# Patient Record
Sex: FEMALE | Race: WHITE | NOT HISPANIC OR LATINO | Employment: FULL TIME | URBAN - METROPOLITAN AREA
[De-identification: names, ages, dates, MRNs, and addresses within clinical notes are randomized per-mention and may not be internally consistent; named-entity substitution may affect disease eponyms.]

---

## 2017-09-06 DIAGNOSIS — M79.672 LEFT FOOT PAIN: Primary | ICD-10-CM

## 2017-09-07 ENCOUNTER — OFFICE VISIT (OUTPATIENT)
Dept: ORTHOPEDICS | Facility: CLINIC | Age: 47
End: 2017-09-07
Payer: COMMERCIAL

## 2017-09-07 ENCOUNTER — HOSPITAL ENCOUNTER (OUTPATIENT)
Dept: RADIOLOGY | Facility: HOSPITAL | Age: 47
Discharge: HOME | End: 2017-09-07
Attending: ORTHOPAEDIC SURGERY
Payer: COMMERCIAL

## 2017-09-07 VITALS
DIASTOLIC BLOOD PRESSURE: 69 MMHG | BODY MASS INDEX: 34.23 KG/M2 | WEIGHT: 213 LBS | HEART RATE: 97 BPM | SYSTOLIC BLOOD PRESSURE: 130 MMHG | HEIGHT: 66 IN

## 2017-09-07 DIAGNOSIS — M76.822 POSTERIOR TIBIAL TENDONITIS, LEFT: Primary | ICD-10-CM

## 2017-09-07 DIAGNOSIS — M79.672 LEFT FOOT PAIN: ICD-10-CM

## 2017-09-07 PROCEDURE — 20550 NJX 1 TENDON SHEATH/LIGAMENT: CPT | Mod: LT,S$GLB,, | Performed by: ORTHOPAEDIC SURGERY

## 2017-09-07 PROCEDURE — 73630 X-RAY EXAM OF FOOT: CPT | Mod: TC,PN,LT

## 2017-09-07 PROCEDURE — 73630 X-RAY EXAM OF FOOT: CPT | Mod: 26,LT,, | Performed by: RADIOLOGY

## 2017-09-07 PROCEDURE — 99244 OFF/OP CNSLTJ NEW/EST MOD 40: CPT | Mod: 25,S$GLB,, | Performed by: ORTHOPAEDIC SURGERY

## 2017-09-07 PROCEDURE — 99999 PR PBB SHADOW E&M-EST. PATIENT-LVL III: CPT | Mod: PBBFAC,,, | Performed by: ORTHOPAEDIC SURGERY

## 2017-09-07 RX ADMIN — TRIAMCINOLONE ACETONIDE 40 MG: 40 INJECTION, SUSPENSION INTRA-ARTICULAR; INTRAMUSCULAR at 05:09

## 2017-09-07 NOTE — PROCEDURES
Tendon Sheath  Date/Time: 9/7/2017 5:28 PM  Performed by: AMRKOS MOORE  Authorized by: MARKOS MOORE     Consent Done?:  Yes (Verbal)  Indications:  Pain  Site marked: the procedure site was marked    Location:  Foot  Foot joint: left posterior tibial tendon.  Prep: patient was prepped and draped in usual sterile fashion    Needle size:  22 G  Approach:  Medial  Medications:  40 mg triamcinolone acetonide 40 mg/mL  Patient tolerance:  Patient tolerated the procedure well with no immediate complications

## 2017-09-07 NOTE — LETTER
September 11, 2017      Adsi Mccann MD  [address] [facility] [address]  [facility] [address]           [address] [address]  Phone: [phone]          Patient: Gi Abdullahi   MR Number: [id number]   YOB: 1970   Date of Visit: 9/7/2017       Dear Dr. Adis Mccann:    Thank you for referring Gi Abdullahi to me for evaluation. Attached you will find relevant portions of my assessment and plan of care.    If you have questions, please do not hesitate to call me. I look forward to following Gi Abdullahi along with you.    Sincerely,    Dimitri Chapman    Enclosure  CC:  No Recipients    If you would like to receive this communication electronically, please contact [email or web address] or [phone] to request more information on [employer] Link access.    For providers and/or their staff who would like to refer a patient to [facility], please contact us through our one-stop-shop provider referral line, [facility], at [phone].    If you feel you have received this communication in error or would no longer like to receive these types of communications, please e-mail [email or web address]

## 2017-09-08 ENCOUNTER — TELEPHONE (OUTPATIENT)
Dept: ORTHOPEDICS | Facility: CLINIC | Age: 47
End: 2017-09-08

## 2017-09-11 RX ORDER — IBUPROFEN 800 MG/1
800 TABLET ORAL 3 TIMES DAILY
Qty: 60 TABLET | Refills: 1 | Status: SHIPPED | OUTPATIENT
Start: 2017-09-11 | End: 2018-01-16

## 2017-09-18 NOTE — PROGRESS NOTES
"HPI: Gi Abdullahi is a  47 y.o. female who was referred to me by Dr. Mccann and was seen in consultation today for left foot pain. The pain began about 2 months ago.  She notes a knot on the side of the foot. She said she did have an Ankle sprain in march but that resolved.  The pain is worse with walking and standing. She rates her pain as 8/10 today     PAST MEDICAL/SURGICAL/FAMILY/SOCIAL/ HISTORY: REVIEWED    ALLERGIES/MEDICATIONS: REVIEWED       Review of Systems:     Constitution: Negative.   HEENT: Negative.   Eyes: Negative.   Cardiovascular: Negative.   Respiratory: Negative.   Endocrine: Negative.   Hematologic/Lymphatic: Negative.   Skin: Negative.   Musculoskeletal: Positive for left foot pain   Gastrointestinal: Negative.   Genitourinary: Negative.   Neurological: Negative.   Psychiatric/Behavioral: Negative.   Allergic/Immunologic: Negative.       PHYSICAL EXAM:  Vitals:    09/07/17 1310   BP: 130/69   Pulse: 97     Ht Readings from Last 1 Encounters:   09/07/17 5' 6" (1.676 m)     Wt Readings from Last 1 Encounters:   09/07/17 96.6 kg (213 lb)       GENERAL: Well developed, well nourished, no acute distress.  SKIN: Skin is intact. No atrophy, abrasions or lesions are noted.   Neurological: Normal mental status. Appropriate and conversant. Alert and oriented x 3.  GAIT: Walks with antalgic gait.    Left  lower extremity:  2+ dorsalis pedis pulse.  Capillary refill < 3 seconds.  Mildly decreased range of motion tibiotalar and subtalar joints.   5/5 strength EHL, FHL, tibialis anterior, gastrocsoleus,  tibialis posterior and peroneals. Sensation to light touch intact sural, saphenous, superficial peroneal and deep peroneal nerves. + swelling,  tenderness to palpation and cyst formation  at insertion of posterior tibial tendon. No ecchymosis or deformity. No lymphadenopathy, no masses or tumors palpated.      XRAYS:   3 views of left foot obtained and reviewed today reveal No evidence of fractures or " dislocations.       ASSESSMENT:        Encounter Diagnosis   Name Primary?    Posterior tibial tendonitis, left Yes        PLAN:   I spent 15 minutes in consulation with the patient today. More than half the time was spent counseling the patient on her condition.  I injected the left foot today. Return to clinic if symptoms persist.

## 2017-09-19 RX ORDER — TRIAMCINOLONE ACETONIDE 40 MG/ML
40 INJECTION, SUSPENSION INTRA-ARTICULAR; INTRAMUSCULAR
Status: DISCONTINUED | OUTPATIENT
Start: 2017-09-07 | End: 2017-09-19 | Stop reason: HOSPADM

## 2018-01-11 ENCOUNTER — TELEPHONE (OUTPATIENT)
Dept: FAMILY MEDICINE | Facility: CLINIC | Age: 48
End: 2018-01-11

## 2018-01-11 NOTE — TELEPHONE ENCOUNTER
Karo,  Can you call pt and please let her know she needs to [facility] care with . Her last clinic visit was with nurse practictioner, JUSTINE Dwyer in Jan 2017.  We got a fax for a refill on her Fluoxetine 40mg from [facility].  I'll fax the pharm if you can call pt to get her appt. ty :)

## 2018-01-15 ENCOUNTER — DOCUMENTATION ONLY (OUTPATIENT)
Dept: FAMILY MEDICINE | Facility: CLINIC | Age: 48
End: 2018-01-15

## 2018-01-15 NOTE — PROGRESS NOTES
Health Maintenance Due   Topic Date Due    Lipid Panel  1970    TETANUS VACCINE  04/28/1988    Pneumococcal PPSV23 (Medium Risk) (1) 04/28/1988    Mammogram  04/28/2010    Influenza Vaccine  08/01/2017

## 2018-01-16 ENCOUNTER — OFFICE VISIT (OUTPATIENT)
Dept: FAMILY MEDICINE | Facility: CLINIC | Age: 48
End: 2018-01-16
Payer: COMMERCIAL

## 2018-01-16 VITALS
WEIGHT: 227.75 LBS | HEIGHT: 66 IN | BODY MASS INDEX: 36.6 KG/M2 | SYSTOLIC BLOOD PRESSURE: 109 MMHG | HEART RATE: 77 BPM | DIASTOLIC BLOOD PRESSURE: 75 MMHG | OXYGEN SATURATION: 99 % | TEMPERATURE: 98 F

## 2018-01-16 DIAGNOSIS — Z13.29 SCREENING FOR ENDOCRINE, METABOLIC, AND IMMUNITY DISORDER: ICD-10-CM

## 2018-01-16 DIAGNOSIS — Z13.0 SCREENING FOR DEFICIENCY ANEMIA: ICD-10-CM

## 2018-01-16 DIAGNOSIS — Z13.220 SCREENING CHOLESTEROL LEVEL: ICD-10-CM

## 2018-01-16 DIAGNOSIS — F32.A ANXIETY AND DEPRESSION: ICD-10-CM

## 2018-01-16 DIAGNOSIS — R40.0 HAS DAYTIME DROWSINESS: ICD-10-CM

## 2018-01-16 DIAGNOSIS — Z12.31 ENCOUNTER FOR SCREENING MAMMOGRAM FOR BREAST CANCER: ICD-10-CM

## 2018-01-16 DIAGNOSIS — Z13.1 DIABETES MELLITUS SCREENING: ICD-10-CM

## 2018-01-16 DIAGNOSIS — B35.4 TINEA CORPORIS: Primary | ICD-10-CM

## 2018-01-16 DIAGNOSIS — Z13.228 SCREENING FOR ENDOCRINE, METABOLIC, AND IMMUNITY DISORDER: ICD-10-CM

## 2018-01-16 DIAGNOSIS — Z13.0 SCREENING FOR ENDOCRINE, METABOLIC, AND IMMUNITY DISORDER: ICD-10-CM

## 2018-01-16 DIAGNOSIS — Z23 FLU VACCINE NEED: ICD-10-CM

## 2018-01-16 DIAGNOSIS — F41.9 ANXIETY AND DEPRESSION: ICD-10-CM

## 2018-01-16 PROCEDURE — 99203 OFFICE O/P NEW LOW 30 MIN: CPT | Mod: 25,S$GLB,, | Performed by: NURSE PRACTITIONER

## 2018-01-16 PROCEDURE — 90686 IIV4 VACC NO PRSV 0.5 ML IM: CPT | Mod: S$GLB,,, | Performed by: INTERNAL MEDICINE

## 2018-01-16 PROCEDURE — 90471 IMMUNIZATION ADMIN: CPT | Mod: S$GLB,,, | Performed by: INTERNAL MEDICINE

## 2018-01-16 RX ORDER — FLUOXETINE HYDROCHLORIDE 40 MG/1
1 CAPSULE ORAL DAILY
COMMUNITY
Start: 2017-01-05 | End: 2018-01-16 | Stop reason: SDUPTHER

## 2018-01-16 RX ORDER — FLUOXETINE HYDROCHLORIDE 40 MG/1
40 CAPSULE ORAL DAILY
Qty: 90 CAPSULE | Refills: 3 | Status: SHIPPED | OUTPATIENT
Start: 2018-01-16 | End: 2019-04-12 | Stop reason: SDUPTHER

## 2018-01-16 RX ORDER — CLOTRIMAZOLE AND BETAMETHASONE DIPROPIONATE 10; .64 MG/G; MG/G
CREAM TOPICAL 2 TIMES DAILY
Qty: 45 G | Refills: 0 | Status: ON HOLD | OUTPATIENT
Start: 2018-01-16 | End: 2018-09-05 | Stop reason: HOSPADM

## 2018-01-16 NOTE — PATIENT INSTRUCTIONS
"  Start with the lotrisone cream. Wash the rash area with a mild soap like dial or dove. Pat dry with a towel. Apply a thin layer of the Lotrisone cream. Do this twice a day until the rash is completely gone. Then,Get Lamisil (Terbinafine) cream over the counter. Do not use any other "anti-fungal" creams or sprays as they are ineffective.  Use once a day (on the same area after washing and drying it as instructed above) for another month. If you stop as soon as the rash is gone, in a few days it will come right back as the infection is deep in the layers of the skin. Once you have finished the 6 weeks of Terbinafine, keep the area clean and dry by using powder daily.   "

## 2018-01-16 NOTE — PROGRESS NOTES
Subjective:       Patient ID: Gi Abdullahi is a 47 y.o. female.    Chief Complaint: Establish Care and Medication Refill    Rash   This is a recurrent problem. The current episode started in the past 7 days. The problem has been rapidly worsening since onset. The affected locations include the abdomen and chest. The rash is characterized by redness, itchiness and burning. She was exposed to nothing. Treatments tried: powder. The treatment provided no relief.   Anxiety   Presents for initial visit. Onset was more than 5 years ago. The problem has been waxing and waning. Symptoms include depressed mood, insomnia and nervous/anxious behavior. Patient reports no suicidal ideas. Symptoms occur constantly. The severity of symptoms is causing significant distress.     Her past medical history is significant for anxiety/panic attacks and depression. There is no history of bipolar disorder. Past treatments include SSRIs and benzodiazephines. Compliance with prior treatments has been good.     Insomnia is chronic, does not have any difficulty falling asleep, but wakes up and can't go back to sleep. Snores loudly, sometimes wakes up feeling dyspneic, has daytime drowsiness.   Review of Systems   Skin: Positive for rash.   Psychiatric/Behavioral: Negative for suicidal ideas. The patient is nervous/anxious and has insomnia.        Objective:      Physical Exam   Constitutional: She is oriented to person, place, and time. She appears well-developed and well-nourished. No distress.   HENT:   Head: Normocephalic and atraumatic.   Right Ear: External ear normal.   Left Ear: External ear normal.   Mouth/Throat: Oropharynx is clear and moist. No oropharyngeal exudate.   Eyes: Conjunctivae are normal. Right eye exhibits no discharge. Left eye exhibits no discharge. No scleral icterus.   Neck: Normal range of motion. Neck supple. No JVD present. No tracheal deviation present. No thyromegaly present.   No supraclavicular adenopathy.  "  Cardiovascular: Normal rate, regular rhythm and normal heart sounds.  Exam reveals no gallop and no friction rub.    No murmur heard.  Pulmonary/Chest: Effort normal and breath sounds normal. No respiratory distress. She has no wheezes. She has no rales.   Abdominal: Soft. Bowel sounds are normal. She exhibits no distension and no mass. There is no tenderness. There is no guarding.   Musculoskeletal: She exhibits no edema.   Lymphadenopathy:     She has no cervical adenopathy.   Neurological: She is alert and oriented to person, place, and time.   Skin: Skin is warm and dry. She is not diaphoretic.   Psychiatric: She has a normal mood and affect. Her behavior is normal.   Nursing note and vitals reviewed.      Assessment:       1. Tinea corporis    2. Anxiety and depression    3. Screening cholesterol level    4. Screening for endocrine, metabolic, and immunity disorder    5. Screening for deficiency anemia    6. Diabetes mellitus screening    7. Has daytime drowsiness        Plan:     Start with the lotrisone cream. Wash the rash area with a mild soap like dial or dove. Pat dry with a towel. Apply a thin layer of the Lotrisone cream. Do this twice a day until the rash is completely gone. Then,Get Lamisil (Terbinafine) cream over the counter. Do not use any other "anti-fungal" creams or sprays as they are ineffective.  Use once a day (on the same area after washing and drying it as instructed above) for another month. If you stop as soon as the rash is gone, in a few days it will come right back as the infection is deep in the layers of the skin. Once you have finished the 6 weeks of Terbinafine, keep the area clean and dry by using powder daily.     Tinea corporis  -     clotrimazole-betamethasone 1-0.05% (LOTRISONE) cream; Apply topically 2 (two) times daily.  Dispense: 45 g; Refill: 0    Anxiety and depression  -     FLUoxetine (PROZAC) 40 MG capsule; Take 1 capsule (40 mg total) by mouth once daily.  Dispense: " 90 capsule; Refill: 3    Screening cholesterol level  -     Lipid panel; Future; Expected date: 01/16/2018    Screening for endocrine, metabolic, and immunity disorder  -     Comprehensive metabolic panel; Future; Expected date: 01/16/2018  -     TSH; Future; Expected date: 01/16/2018    Screening for deficiency anemia  -     CBC auto differential; Future; Expected date: 01/16/2018    Diabetes mellitus screening  -     Hemoglobin A1c; Future; Expected date: 01/16/2018    Has daytime drowsiness  -     Polysomnography 4 or more parameters; Future; Expected date: 01/30/2018       1 month

## 2018-09-02 ENCOUNTER — HOSPITAL ENCOUNTER (INPATIENT)
Facility: HOSPITAL | Age: 48
LOS: 1 days | Discharge: HOME | DRG: 418 | End: 2018-09-05
Attending: EMERGENCY MEDICINE | Admitting: HOSPITALIST
Payer: COMMERCIAL

## 2018-09-02 DIAGNOSIS — K80.21 CALCULUS OF GALLBLADDER WITH BILIARY OBSTRUCTION BUT WITHOUT CHOLECYSTITIS: Primary | ICD-10-CM

## 2018-09-02 DIAGNOSIS — R07.9 CHEST PAIN: ICD-10-CM

## 2018-09-02 PROBLEM — F41.9 ANXIETY AND DEPRESSION: Status: ACTIVE | Noted: 2018-09-02

## 2018-09-02 PROBLEM — F32.A ANXIETY AND DEPRESSION: Status: ACTIVE | Noted: 2018-09-02

## 2018-09-02 PROBLEM — K80.00 CALCULUS OF GALLBLADDER WITH ACUTE CHOLECYSTITIS WITHOUT OBSTRUCTION: Status: ACTIVE | Noted: 2018-09-02

## 2018-09-02 PROBLEM — F17.200 NICOTINE DEPENDENCE WITH CURRENT USE: Status: ACTIVE | Noted: 2018-09-02

## 2018-09-02 LAB
ALBUMIN SERPL BCP-MCNC: 3.6 G/DL
ALP SERPL-CCNC: 82 U/L
ALT SERPL W/O P-5'-P-CCNC: 27 U/L
ANION GAP SERPL CALC-SCNC: 10 MMOL/L
AST SERPL-CCNC: 16 U/L
BASOPHILS # BLD AUTO: 0.2 K/UL
BASOPHILS NFR BLD: 2.3 %
BILIRUB SERPL-MCNC: 0.2 MG/DL
BNP SERPL-MCNC: <10 PG/ML
BUN SERPL-MCNC: 9 MG/DL
CALCIUM SERPL-MCNC: 9.9 MG/DL
CHLORIDE SERPL-SCNC: 103 MMOL/L
CO2 SERPL-SCNC: 26 MMOL/L
CREAT SERPL-MCNC: 0.8 MG/DL
DIFFERENTIAL METHOD: ABNORMAL
EOSINOPHIL # BLD AUTO: 0.2 K/UL
EOSINOPHIL NFR BLD: 1.7 %
ERYTHROCYTE [DISTWIDTH] IN BLOOD BY AUTOMATED COUNT: 14.4 %
EST. GFR  (AFRICAN AMERICAN): >60 ML/MIN/1.73 M^2
EST. GFR  (NON AFRICAN AMERICAN): >60 ML/MIN/1.73 M^2
GLUCOSE SERPL-MCNC: 104 MG/DL
HCT VFR BLD AUTO: 43.9 %
HGB BLD-MCNC: 14.1 G/DL
LYMPHOCYTES # BLD AUTO: 4.2 K/UL
LYMPHOCYTES NFR BLD: 41.7 %
MCH RBC QN AUTO: 28.7 PG
MCHC RBC AUTO-ENTMCNC: 32 G/DL
MCV RBC AUTO: 90 FL
MONOCYTES # BLD AUTO: 0.7 K/UL
MONOCYTES NFR BLD: 6.8 %
NEUTROPHILS # BLD AUTO: 4.8 K/UL
NEUTROPHILS NFR BLD: 47.5 %
PLATELET # BLD AUTO: 368 K/UL
PMV BLD AUTO: 7.9 FL
POTASSIUM SERPL-SCNC: 4 MMOL/L
PROT SERPL-MCNC: 7 G/DL
RBC # BLD AUTO: 4.9 M/UL
SODIUM SERPL-SCNC: 139 MMOL/L
TROPONIN I SERPL DL<=0.01 NG/ML-MCNC: <0.006 NG/ML
WBC # BLD AUTO: 10.2 K/UL

## 2018-09-02 PROCEDURE — 80053 COMPREHEN METABOLIC PANEL: CPT

## 2018-09-02 PROCEDURE — 63600175 PHARM REV CODE 636 W HCPCS: Performed by: EMERGENCY MEDICINE

## 2018-09-02 PROCEDURE — 96375 TX/PRO/DX INJ NEW DRUG ADDON: CPT

## 2018-09-02 PROCEDURE — 99285 EMERGENCY DEPT VISIT HI MDM: CPT | Mod: 25

## 2018-09-02 PROCEDURE — 25000003 PHARM REV CODE 250: Performed by: NURSE PRACTITIONER

## 2018-09-02 PROCEDURE — G0378 HOSPITAL OBSERVATION PER HR: HCPCS

## 2018-09-02 PROCEDURE — 85025 COMPLETE CBC W/AUTO DIFF WBC: CPT

## 2018-09-02 PROCEDURE — 84484 ASSAY OF TROPONIN QUANT: CPT

## 2018-09-02 PROCEDURE — 93005 ELECTROCARDIOGRAM TRACING: CPT

## 2018-09-02 PROCEDURE — 83880 ASSAY OF NATRIURETIC PEPTIDE: CPT

## 2018-09-02 PROCEDURE — 96374 THER/PROPH/DIAG INJ IV PUSH: CPT

## 2018-09-02 PROCEDURE — 96376 TX/PRO/DX INJ SAME DRUG ADON: CPT

## 2018-09-02 PROCEDURE — 63600175 PHARM REV CODE 636 W HCPCS: Performed by: NURSE PRACTITIONER

## 2018-09-02 RX ORDER — IBUPROFEN 200 MG
16 TABLET ORAL
Status: DISCONTINUED | OUTPATIENT
Start: 2018-09-02 | End: 2018-09-05 | Stop reason: HOSPADM

## 2018-09-02 RX ORDER — LANOLIN ALCOHOL/MO/W.PET/CERES
800 CREAM (GRAM) TOPICAL
Status: DISCONTINUED | OUTPATIENT
Start: 2018-09-02 | End: 2018-09-05 | Stop reason: HOSPADM

## 2018-09-02 RX ORDER — ONDANSETRON 2 MG/ML
4 INJECTION INTRAMUSCULAR; INTRAVENOUS
Status: COMPLETED | OUTPATIENT
Start: 2018-09-02 | End: 2018-09-02

## 2018-09-02 RX ORDER — SODIUM CHLORIDE 0.9 % (FLUSH) 0.9 %
5 SYRINGE (ML) INJECTION
Status: DISCONTINUED | OUTPATIENT
Start: 2018-09-02 | End: 2018-09-05 | Stop reason: HOSPADM

## 2018-09-02 RX ORDER — POTASSIUM CHLORIDE 20 MEQ/15ML
40 SOLUTION ORAL
Status: DISCONTINUED | OUTPATIENT
Start: 2018-09-02 | End: 2018-09-05 | Stop reason: HOSPADM

## 2018-09-02 RX ORDER — MORPHINE SULFATE 4 MG/ML
4 INJECTION, SOLUTION INTRAMUSCULAR; INTRAVENOUS EVERY 4 HOURS PRN
Status: CANCELLED | OUTPATIENT
Start: 2018-09-02

## 2018-09-02 RX ORDER — MORPHINE SULFATE 4 MG/ML
2 INJECTION, SOLUTION INTRAMUSCULAR; INTRAVENOUS EVERY 4 HOURS PRN
Status: CANCELLED | OUTPATIENT
Start: 2018-09-02

## 2018-09-02 RX ORDER — MORPHINE SULFATE 4 MG/ML
6 INJECTION, SOLUTION INTRAMUSCULAR; INTRAVENOUS
Status: COMPLETED | OUTPATIENT
Start: 2018-09-02 | End: 2018-09-02

## 2018-09-02 RX ORDER — ONDANSETRON 2 MG/ML
8 INJECTION INTRAMUSCULAR; INTRAVENOUS EVERY 8 HOURS PRN
Status: DISCONTINUED | OUTPATIENT
Start: 2018-09-02 | End: 2018-09-05 | Stop reason: HOSPADM

## 2018-09-02 RX ORDER — IBUPROFEN 200 MG
1 TABLET ORAL DAILY
Status: DISCONTINUED | OUTPATIENT
Start: 2018-09-03 | End: 2018-09-05 | Stop reason: HOSPADM

## 2018-09-02 RX ORDER — ONDANSETRON 2 MG/ML
INJECTION INTRAMUSCULAR; INTRAVENOUS
Status: DISPENSED
Start: 2018-09-02 | End: 2018-09-03

## 2018-09-02 RX ORDER — FLUOXETINE HYDROCHLORIDE 20 MG/1
40 CAPSULE ORAL DAILY
Status: DISCONTINUED | OUTPATIENT
Start: 2018-09-03 | End: 2018-09-05 | Stop reason: HOSPADM

## 2018-09-02 RX ORDER — HYDROCODONE BITARTRATE AND ACETAMINOPHEN 10; 325 MG/1; MG/1
1 TABLET ORAL EVERY 6 HOURS PRN
Status: DISCONTINUED | OUTPATIENT
Start: 2018-09-02 | End: 2018-09-05 | Stop reason: HOSPADM

## 2018-09-02 RX ORDER — RAMELTEON 8 MG/1
8 TABLET ORAL NIGHTLY PRN
Status: DISCONTINUED | OUTPATIENT
Start: 2018-09-02 | End: 2018-09-05 | Stop reason: HOSPADM

## 2018-09-02 RX ORDER — GLUCAGON 1 MG
1 KIT INJECTION
Status: DISCONTINUED | OUTPATIENT
Start: 2018-09-02 | End: 2018-09-05 | Stop reason: HOSPADM

## 2018-09-02 RX ORDER — SODIUM CHLORIDE 9 MG/ML
INJECTION, SOLUTION INTRAVENOUS CONTINUOUS
Status: DISCONTINUED | OUTPATIENT
Start: 2018-09-02 | End: 2018-09-05 | Stop reason: HOSPADM

## 2018-09-02 RX ORDER — HYDROMORPHONE HYDROCHLORIDE 2 MG/ML
1 INJECTION, SOLUTION INTRAMUSCULAR; INTRAVENOUS; SUBCUTANEOUS EVERY 4 HOURS PRN
Status: DISCONTINUED | OUTPATIENT
Start: 2018-09-02 | End: 2018-09-05 | Stop reason: HOSPADM

## 2018-09-02 RX ORDER — POTASSIUM CHLORIDE 20 MEQ/15ML
60 SOLUTION ORAL
Status: DISCONTINUED | OUTPATIENT
Start: 2018-09-02 | End: 2018-09-05 | Stop reason: HOSPADM

## 2018-09-02 RX ORDER — AMOXICILLIN 250 MG
1 CAPSULE ORAL 2 TIMES DAILY
Status: DISCONTINUED | OUTPATIENT
Start: 2018-09-02 | End: 2018-09-05 | Stop reason: HOSPADM

## 2018-09-02 RX ORDER — IBUPROFEN 200 MG
24 TABLET ORAL
Status: DISCONTINUED | OUTPATIENT
Start: 2018-09-02 | End: 2018-09-05 | Stop reason: HOSPADM

## 2018-09-02 RX ADMIN — MORPHINE SULFATE 6 MG: 4 INJECTION, SOLUTION INTRAMUSCULAR; INTRAVENOUS at 09:09

## 2018-09-02 RX ADMIN — SODIUM CHLORIDE: 0.9 INJECTION, SOLUTION INTRAVENOUS at 11:09

## 2018-09-02 RX ADMIN — ONDANSETRON 4 MG: 2 INJECTION INTRAMUSCULAR; INTRAVENOUS at 10:09

## 2018-09-02 RX ADMIN — HYDROMORPHONE HYDROCHLORIDE 1 MG: 2 INJECTION INTRAMUSCULAR; INTRAVENOUS; SUBCUTANEOUS at 11:09

## 2018-09-02 RX ADMIN — ONDANSETRON 4 MG: 2 INJECTION INTRAMUSCULAR; INTRAVENOUS at 09:09

## 2018-09-03 ENCOUNTER — ANESTHESIA (OUTPATIENT)
Dept: SURGERY | Facility: HOSPITAL | Age: 48
DRG: 418 | End: 2018-09-03
Payer: COMMERCIAL

## 2018-09-03 ENCOUNTER — ANESTHESIA EVENT (OUTPATIENT)
Dept: SURGERY | Facility: HOSPITAL | Age: 48
DRG: 418 | End: 2018-09-03
Payer: COMMERCIAL

## 2018-09-03 PROBLEM — F33.0 MILD EPISODE OF RECURRENT MAJOR DEPRESSIVE DISORDER: Status: ACTIVE | Noted: 2018-09-02

## 2018-09-03 PROBLEM — E66.01 MORBIDLY OBESE: Status: ACTIVE | Noted: 2018-09-03

## 2018-09-03 LAB
ALBUMIN SERPL BCP-MCNC: 3.3 G/DL
ALP SERPL-CCNC: 77 U/L
ALT SERPL W/O P-5'-P-CCNC: 38 U/L
ANION GAP SERPL CALC-SCNC: 8 MMOL/L
AST SERPL-CCNC: 26 U/L
BASOPHILS # BLD AUTO: 0 K/UL
BASOPHILS NFR BLD: 0.2 %
BILIRUB SERPL-MCNC: 0.2 MG/DL
BILIRUB UR QL STRIP: NEGATIVE
BUN SERPL-MCNC: 10 MG/DL
CALCIUM SERPL-MCNC: 8.7 MG/DL
CHLORIDE SERPL-SCNC: 105 MMOL/L
CLARITY UR: CLEAR
CO2 SERPL-SCNC: 26 MMOL/L
COLOR UR: YELLOW
CREAT SERPL-MCNC: 0.7 MG/DL
DIFFERENTIAL METHOD: ABNORMAL
EOSINOPHIL # BLD AUTO: 0 K/UL
EOSINOPHIL NFR BLD: 0.1 %
ERYTHROCYTE [DISTWIDTH] IN BLOOD BY AUTOMATED COUNT: 14.4 %
EST. GFR  (AFRICAN AMERICAN): >60 ML/MIN/1.73 M^2
EST. GFR  (NON AFRICAN AMERICAN): >60 ML/MIN/1.73 M^2
GLUCOSE SERPL-MCNC: 121 MG/DL
GLUCOSE UR QL STRIP: NEGATIVE
HCT VFR BLD AUTO: 39.3 %
HGB BLD-MCNC: 13 G/DL
HGB UR QL STRIP: NEGATIVE
INR PPP: 1
KETONES UR QL STRIP: NEGATIVE
LEUKOCYTE ESTERASE UR QL STRIP: NEGATIVE
LYMPHOCYTES # BLD AUTO: 1.1 K/UL
LYMPHOCYTES NFR BLD: 9.2 %
MAGNESIUM SERPL-MCNC: 2.1 MG/DL
MCH RBC QN AUTO: 29.3 PG
MCHC RBC AUTO-ENTMCNC: 33.1 G/DL
MCV RBC AUTO: 89 FL
MONOCYTES # BLD AUTO: 0.2 K/UL
MONOCYTES NFR BLD: 1.9 %
NEUTROPHILS # BLD AUTO: 11.1 K/UL
NEUTROPHILS NFR BLD: 88.6 %
NITRITE UR QL STRIP: NEGATIVE
PH UR STRIP: 6 [PH] (ref 5–8)
PHOSPHATE SERPL-MCNC: 3.7 MG/DL
PLATELET # BLD AUTO: 331 K/UL
PMV BLD AUTO: 8.3 FL
POTASSIUM SERPL-SCNC: 5.1 MMOL/L
PROT SERPL-MCNC: 6.4 G/DL
PROT UR QL STRIP: NEGATIVE
PROTHROMBIN TIME: 9.8 SEC
RBC # BLD AUTO: 4.43 M/UL
SODIUM SERPL-SCNC: 139 MMOL/L
SP GR UR STRIP: 1.02 (ref 1–1.03)
URN SPEC COLLECT METH UR: NORMAL
UROBILINOGEN UR STRIP-ACNC: 1 EU/DL
WBC # BLD AUTO: 12.5 K/UL

## 2018-09-03 PROCEDURE — 25000003 PHARM REV CODE 250: Performed by: SURGERY

## 2018-09-03 PROCEDURE — 47562 LAPAROSCOPIC CHOLECYSTECTOMY: CPT | Mod: ,,, | Performed by: SURGERY

## 2018-09-03 PROCEDURE — G0378 HOSPITAL OBSERVATION PER HR: HCPCS

## 2018-09-03 PROCEDURE — 99254 IP/OBS CNSLTJ NEW/EST MOD 60: CPT | Mod: ,,, | Performed by: SURGERY

## 2018-09-03 PROCEDURE — 88304 TISSUE EXAM BY PATHOLOGIST: CPT | Performed by: PATHOLOGY

## 2018-09-03 PROCEDURE — 81003 URINALYSIS AUTO W/O SCOPE: CPT

## 2018-09-03 PROCEDURE — 63600175 PHARM REV CODE 636 W HCPCS: Performed by: NURSE ANESTHETIST, CERTIFIED REGISTERED

## 2018-09-03 PROCEDURE — 25000003 PHARM REV CODE 250: Performed by: NURSE ANESTHETIST, CERTIFIED REGISTERED

## 2018-09-03 PROCEDURE — 25000003 PHARM REV CODE 250: Performed by: NURSE PRACTITIONER

## 2018-09-03 PROCEDURE — 25000003 PHARM REV CODE 250: Performed by: ANESTHESIOLOGY

## 2018-09-03 PROCEDURE — 36415 COLL VENOUS BLD VENIPUNCTURE: CPT

## 2018-09-03 PROCEDURE — 0FT44ZZ RESECTION OF GALLBLADDER, PERCUTANEOUS ENDOSCOPIC APPROACH: ICD-10-PCS | Performed by: SURGERY

## 2018-09-03 PROCEDURE — 36000709 HC OR TIME LEV III EA ADD 15 MIN: Performed by: SURGERY

## 2018-09-03 PROCEDURE — D9220A PRA ANESTHESIA: Mod: ANES,,, | Performed by: ANESTHESIOLOGY

## 2018-09-03 PROCEDURE — 37000008 HC ANESTHESIA 1ST 15 MINUTES: Performed by: SURGERY

## 2018-09-03 PROCEDURE — D9220A PRA ANESTHESIA: Mod: CRNA,,, | Performed by: NURSE ANESTHETIST, CERTIFIED REGISTERED

## 2018-09-03 PROCEDURE — 36000708 HC OR TIME LEV III 1ST 15 MIN: Performed by: SURGERY

## 2018-09-03 PROCEDURE — 37000009 HC ANESTHESIA EA ADD 15 MINS: Performed by: SURGERY

## 2018-09-03 PROCEDURE — 27201423 OPTIME MED/SURG SUP & DEVICES STERILE SUPPLY: Performed by: SURGERY

## 2018-09-03 PROCEDURE — 84100 ASSAY OF PHOSPHORUS: CPT

## 2018-09-03 PROCEDURE — 80053 COMPREHEN METABOLIC PANEL: CPT

## 2018-09-03 PROCEDURE — 96361 HYDRATE IV INFUSION ADD-ON: CPT

## 2018-09-03 PROCEDURE — 63600175 PHARM REV CODE 636 W HCPCS: Performed by: NURSE PRACTITIONER

## 2018-09-03 PROCEDURE — 63600175 PHARM REV CODE 636 W HCPCS: Performed by: SURGERY

## 2018-09-03 PROCEDURE — 71000039 HC RECOVERY, EACH ADD'L HOUR: Performed by: SURGERY

## 2018-09-03 PROCEDURE — 88304 TISSUE EXAM BY PATHOLOGIST: CPT | Mod: 26,,, | Performed by: PATHOLOGY

## 2018-09-03 PROCEDURE — 85025 COMPLETE CBC W/AUTO DIFF WBC: CPT

## 2018-09-03 PROCEDURE — 85610 PROTHROMBIN TIME: CPT

## 2018-09-03 PROCEDURE — 83735 ASSAY OF MAGNESIUM: CPT

## 2018-09-03 PROCEDURE — 71000033 HC RECOVERY, INTIAL HOUR: Performed by: SURGERY

## 2018-09-03 PROCEDURE — 96365 THER/PROPH/DIAG IV INF INIT: CPT

## 2018-09-03 RX ORDER — GLYCOPYRROLATE 0.2 MG/ML
INJECTION INTRAMUSCULAR; INTRAVENOUS
Status: DISCONTINUED | OUTPATIENT
Start: 2018-09-03 | End: 2018-09-03

## 2018-09-03 RX ORDER — FENTANYL CITRATE 50 UG/ML
25 INJECTION, SOLUTION INTRAMUSCULAR; INTRAVENOUS EVERY 5 MIN PRN
Status: DISCONTINUED | OUTPATIENT
Start: 2018-09-03 | End: 2018-09-03

## 2018-09-03 RX ORDER — ENOXAPARIN SODIUM 100 MG/ML
40 INJECTION SUBCUTANEOUS EVERY 24 HOURS
Status: DISCONTINUED | OUTPATIENT
Start: 2018-09-04 | End: 2018-09-05 | Stop reason: HOSPADM

## 2018-09-03 RX ORDER — SODIUM CHLORIDE, SODIUM LACTATE, POTASSIUM CHLORIDE, CALCIUM CHLORIDE 600; 310; 30; 20 MG/100ML; MG/100ML; MG/100ML; MG/100ML
INJECTION, SOLUTION INTRAVENOUS CONTINUOUS PRN
Status: DISCONTINUED | OUTPATIENT
Start: 2018-09-03 | End: 2018-09-03

## 2018-09-03 RX ORDER — ONDANSETRON HYDROCHLORIDE 2 MG/ML
INJECTION, SOLUTION INTRAMUSCULAR; INTRAVENOUS
Status: DISCONTINUED | OUTPATIENT
Start: 2018-09-03 | End: 2018-09-03

## 2018-09-03 RX ORDER — MIDAZOLAM HYDROCHLORIDE 1 MG/ML
INJECTION, SOLUTION INTRAMUSCULAR; INTRAVENOUS
Status: DISCONTINUED | OUTPATIENT
Start: 2018-09-03 | End: 2018-09-03

## 2018-09-03 RX ORDER — SODIUM CHLORIDE, SODIUM LACTATE, POTASSIUM CHLORIDE, CALCIUM CHLORIDE 600; 310; 30; 20 MG/100ML; MG/100ML; MG/100ML; MG/100ML
125 INJECTION, SOLUTION INTRAVENOUS CONTINUOUS
Status: DISCONTINUED | OUTPATIENT
Start: 2018-09-03 | End: 2018-09-03

## 2018-09-03 RX ORDER — ACETAMINOPHEN 10 MG/ML
INJECTION, SOLUTION INTRAVENOUS
Status: DISCONTINUED | OUTPATIENT
Start: 2018-09-03 | End: 2018-09-03

## 2018-09-03 RX ORDER — MEPERIDINE HYDROCHLORIDE 50 MG/ML
12.5 INJECTION INTRAMUSCULAR; INTRAVENOUS; SUBCUTANEOUS ONCE AS NEEDED
Status: DISCONTINUED | OUTPATIENT
Start: 2018-09-03 | End: 2018-09-03

## 2018-09-03 RX ORDER — ONDANSETRON 2 MG/ML
4 INJECTION INTRAMUSCULAR; INTRAVENOUS ONCE AS NEEDED
Status: DISCONTINUED | OUTPATIENT
Start: 2018-09-03 | End: 2018-09-03

## 2018-09-03 RX ORDER — ROCURONIUM BROMIDE 10 MG/ML
INJECTION, SOLUTION INTRAVENOUS
Status: DISCONTINUED | OUTPATIENT
Start: 2018-09-03 | End: 2018-09-03

## 2018-09-03 RX ORDER — KETOROLAC TROMETHAMINE 30 MG/ML
INJECTION, SOLUTION INTRAMUSCULAR; INTRAVENOUS
Status: DISCONTINUED | OUTPATIENT
Start: 2018-09-03 | End: 2018-09-03

## 2018-09-03 RX ORDER — BUPIVACAINE HYDROCHLORIDE AND EPINEPHRINE 2.5; 5 MG/ML; UG/ML
INJECTION, SOLUTION EPIDURAL; INFILTRATION; INTRACAUDAL; PERINEURAL
Status: DISCONTINUED | OUTPATIENT
Start: 2018-09-03 | End: 2018-09-03 | Stop reason: HOSPADM

## 2018-09-03 RX ORDER — DEXAMETHASONE SODIUM PHOSPHATE 4 MG/ML
INJECTION, SOLUTION INTRA-ARTICULAR; INTRALESIONAL; INTRAMUSCULAR; INTRAVENOUS; SOFT TISSUE
Status: DISCONTINUED | OUTPATIENT
Start: 2018-09-03 | End: 2018-09-03

## 2018-09-03 RX ORDER — NEOSTIGMINE METHYLSULFATE 1 MG/ML
INJECTION, SOLUTION INTRAVENOUS
Status: DISCONTINUED | OUTPATIENT
Start: 2018-09-03 | End: 2018-09-03

## 2018-09-03 RX ORDER — OXYCODONE HYDROCHLORIDE 5 MG/1
5 TABLET ORAL ONCE AS NEEDED
Status: DISCONTINUED | OUTPATIENT
Start: 2018-09-03 | End: 2018-09-03

## 2018-09-03 RX ORDER — PROPOFOL 10 MG/ML
VIAL (ML) INTRAVENOUS
Status: DISCONTINUED | OUTPATIENT
Start: 2018-09-03 | End: 2018-09-03

## 2018-09-03 RX ORDER — LIDOCAINE HCL/PF 100 MG/5ML
SYRINGE (ML) INTRAVENOUS
Status: DISCONTINUED | OUTPATIENT
Start: 2018-09-03 | End: 2018-09-03

## 2018-09-03 RX ORDER — ENOXAPARIN SODIUM 100 MG/ML
40 INJECTION SUBCUTANEOUS EVERY 24 HOURS
Status: DISCONTINUED | OUTPATIENT
Start: 2018-09-04 | End: 2018-09-03

## 2018-09-03 RX ORDER — FENTANYL CITRATE 50 UG/ML
INJECTION, SOLUTION INTRAMUSCULAR; INTRAVENOUS
Status: DISCONTINUED | OUTPATIENT
Start: 2018-09-03 | End: 2018-09-03

## 2018-09-03 RX ORDER — CEFAZOLIN SODIUM 2 G/50ML
2 SOLUTION INTRAVENOUS ONCE
Status: COMPLETED | OUTPATIENT
Start: 2018-09-03 | End: 2018-09-03

## 2018-09-03 RX ADMIN — KETOROLAC TROMETHAMINE 30 MG: 30 INJECTION, SOLUTION INTRAMUSCULAR; INTRAVENOUS at 02:09

## 2018-09-03 RX ADMIN — HYDROCODONE BITARTRATE AND ACETAMINOPHEN 1 TABLET: 10; 325 TABLET ORAL at 08:09

## 2018-09-03 RX ADMIN — ROCURONIUM BROMIDE 40 MG: 10 INJECTION, SOLUTION INTRAVENOUS at 01:09

## 2018-09-03 RX ADMIN — ESMOLOL HYDROCHLORIDE 10 MG: 20 INJECTION INTRAVENOUS at 01:09

## 2018-09-03 RX ADMIN — PIPERACILLIN AND TAZOBACTAM 3.38 G: 3; .375 INJECTION, POWDER, LYOPHILIZED, FOR SOLUTION INTRAVENOUS; PARENTERAL at 11:09

## 2018-09-03 RX ADMIN — ONDANSETRON 4 MG: 2 INJECTION, SOLUTION INTRAMUSCULAR; INTRAVENOUS at 01:09

## 2018-09-03 RX ADMIN — GLYCOPYRROLATE 0.4 MG: 0.2 INJECTION, SOLUTION INTRAMUSCULAR; INTRAVENOUS at 02:09

## 2018-09-03 RX ADMIN — MIDAZOLAM 2 MG: 1 INJECTION INTRAMUSCULAR; INTRAVENOUS at 12:09

## 2018-09-03 RX ADMIN — NEOSTIGMINE METHYLSULFATE 3 MG: 1 INJECTION INTRAVENOUS at 02:09

## 2018-09-03 RX ADMIN — FENTANYL CITRATE 50 MCG: 50 INJECTION, SOLUTION INTRAMUSCULAR; INTRAVENOUS at 02:09

## 2018-09-03 RX ADMIN — OXYCODONE HYDROCHLORIDE 5 MG: 5 TABLET ORAL at 02:09

## 2018-09-03 RX ADMIN — ACETAMINOPHEN 1000 MG: 10 INJECTION, SOLUTION INTRAVENOUS at 01:09

## 2018-09-03 RX ADMIN — CEFAZOLIN SODIUM 2 G: 2 SOLUTION INTRAVENOUS at 01:09

## 2018-09-03 RX ADMIN — STANDARDIZED SENNA CONCENTRATE AND DOCUSATE SODIUM 1 TABLET: 8.6; 5 TABLET, FILM COATED ORAL at 07:09

## 2018-09-03 RX ADMIN — FENTANYL CITRATE 100 MCG: 50 INJECTION, SOLUTION INTRAMUSCULAR; INTRAVENOUS at 01:09

## 2018-09-03 RX ADMIN — GLYCOPYRROLATE 0.2 MG: 0.2 INJECTION, SOLUTION INTRAMUSCULAR; INTRAVENOUS at 01:09

## 2018-09-03 RX ADMIN — LIDOCAINE HYDROCHLORIDE 100 MG: 20 INJECTION, SOLUTION INTRAVENOUS at 01:09

## 2018-09-03 RX ADMIN — ONDANSETRON 8 MG: 2 INJECTION INTRAMUSCULAR; INTRAVENOUS at 09:09

## 2018-09-03 RX ADMIN — SODIUM CHLORIDE, SODIUM LACTATE, POTASSIUM CHLORIDE, AND CALCIUM CHLORIDE: .6; .31; .03; .02 INJECTION, SOLUTION INTRAVENOUS at 12:09

## 2018-09-03 RX ADMIN — SODIUM CHLORIDE, SODIUM LACTATE, POTASSIUM CHLORIDE, AND CALCIUM CHLORIDE: .6; .31; .03; .02 INJECTION, SOLUTION INTRAVENOUS at 01:09

## 2018-09-03 RX ADMIN — SODIUM CHLORIDE: 0.9 INJECTION, SOLUTION INTRAVENOUS at 08:09

## 2018-09-03 RX ADMIN — HYDROCODONE BITARTRATE AND ACETAMINOPHEN 1 TABLET: 10; 325 TABLET ORAL at 11:09

## 2018-09-03 RX ADMIN — DEXAMETHASONE SODIUM PHOSPHATE 4 MG: 4 INJECTION, SOLUTION INTRAMUSCULAR; INTRAVENOUS at 01:09

## 2018-09-03 RX ADMIN — PROPOFOL 150 MG: 10 INJECTION, EMULSION INTRAVENOUS at 01:09

## 2018-09-03 RX ADMIN — PIPERACILLIN AND TAZOBACTAM 3.38 G: 3; .375 INJECTION, POWDER, LYOPHILIZED, FOR SOLUTION INTRAVENOUS; PARENTERAL at 06:09

## 2018-09-03 NOTE — PROGRESS NOTES
Pt received from the floor and now in PACU waiting for surgery. Vital signs stable. No distress noted. Dr. Guzmán spoke with pt. Pt's family at the bedside.

## 2018-09-03 NOTE — SUBJECTIVE & OBJECTIVE
History reviewed. No pertinent past medical history.    Past Surgical History:   Procedure Laterality Date    HYSTERECTOMY         Review of patient's allergies indicates:  No Known Allergies    No current facility-administered medications on file prior to encounter.      Current Outpatient Medications on File Prior to Encounter   Medication Sig    clotrimazole-betamethasone 1-0.05% (LOTRISONE) cream Apply topically 2 (two) times daily.    FLUoxetine (PROZAC) 40 MG capsule Take 1 capsule (40 mg total) by mouth once daily.     Family History     Problem Relation (Age of Onset)    Cancer Father    Hypertension Mother        Tobacco Use    Smoking status: Current Every Day Smoker     Packs/day: 1.00    Smokeless tobacco: Never Used   Substance and Sexual Activity    Alcohol use: No     Frequency: Never    Drug use: No    Sexual activity: Not on file     Review of Systems   Constitutional: Negative for activity change, appetite change, chills, fatigue and fever.   HENT: Negative for congestion, postnasal drip, sinus pressure, sore throat and trouble swallowing.    Eyes: Negative for photophobia and visual disturbance.   Respiratory: Positive for shortness of breath (when pain is severe). Negative for cough and wheezing.    Cardiovascular: Positive for chest pain (radiates to chest). Negative for palpitations and leg swelling.   Gastrointestinal: Positive for abdominal pain and nausea. Negative for constipation, diarrhea and vomiting.   Genitourinary: Negative for dysuria, flank pain and hematuria.   Musculoskeletal: Positive for back pain. Negative for arthralgias and myalgias.   Skin: Negative for color change.   Neurological: Negative for dizziness, weakness, light-headedness and headaches.   Psychiatric/Behavioral: Negative for confusion. The patient is not nervous/anxious.      Objective:     Vital Signs (Most Recent):  Temp: 98.1 °F (36.7 °C) (09/02/18 2104)  Pulse: 67 (09/02/18 2243)  Resp: (!) 22  (09/02/18 2104)  BP: (!) 147/69 (09/02/18 2126)  SpO2: (!) 93 % (09/02/18 2243) Vital Signs (24h Range):  Temp:  [98.1 °F (36.7 °C)] 98.1 °F (36.7 °C)  Pulse:  [63-69] 67  Resp:  [22] 22  SpO2:  [90 %-100 %] 93 %  BP: (141-147)/(68-69) 147/69     Weight: 96.6 kg (213 lb)  Body mass index is 34.38 kg/m².    Physical Exam   Constitutional: She is oriented to person, place, and time. She appears well-developed and well-nourished. No distress.   HENT:   Head: Normocephalic and atraumatic.   Eyes: Conjunctivae and EOM are normal. Pupils are equal, round, and reactive to light.   Neck: Normal range of motion. Neck supple. No thyromegaly present.   Cardiovascular: Normal rate, regular rhythm, normal heart sounds and intact distal pulses.   No murmur heard.  Pulmonary/Chest: Effort normal and breath sounds normal. No respiratory distress.   Abdominal: Soft. Bowel sounds are normal. She exhibits no distension. There is tenderness (significant RUQ tenderness). There is guarding.   Musculoskeletal: Normal range of motion. She exhibits no edema or tenderness.   Neurological: She is alert and oriented to person, place, and time. No cranial nerve deficit.   Skin: Skin is warm and dry. Capillary refill takes less than 2 seconds. No erythema.   Psychiatric: She has a normal mood and affect. Her behavior is normal. Judgment and thought content normal.         CRANIAL NERVES     CN III, IV, VI   Pupils are equal, round, and reactive to light.  Extraocular motions are normal.        Significant Labs:   CBC:   Recent Labs   Lab  09/02/18 2125   WBC  10.20   HGB  14.1   HCT  43.9   PLT  368*     CMP:   Recent Labs   Lab  09/02/18 2125   NA  139   K  4.0   CL  103   CO2  26   GLU  104   BUN  9   CREATININE  0.8   CALCIUM  9.9   PROT  7.0   ALBUMIN  3.6   BILITOT  0.2   ALKPHOS  82   AST  16   ALT  27   ANIONGAP  10   EGFRNONAA  >60     Troponin:   Recent Labs   Lab  09/02/18 2125   TROPONINI  <0.006       Significant Imaging:  EKG:  I reviewed.  Sinus arrthymia with no ischemic ST or T wave abnormalities.  Rate 76.    Abd U/S (VRAD): Cholelithiasis, with a 2 cm stone within the gallbladder neck that appears immobile. There are no ultrasound findings of acute cholecystitis, however. Further evaluation with hepatobiliary scintigraphy is recommended, if clinically indicated.

## 2018-09-03 NOTE — CONSULTS
[facility]  General Surgery  Consult Note    Patient Name: Gi Abdullahi  MRN: [id number]  Code Status: Full Code  Admission Date: 9/2/2018  Hospital Length of Stay: 0 days  Attending Physician: Jero Lloyd MD  Primary Care Provider: Cain Cristobal MD    Patient information was obtained from the patient.     Inpatient consult to General Surgery  Consult performed by: Wlimer Degroot MD  Consult ordered by: Chasity Valdez NP        Subjective:     Principal Problem: Calculus of gallbladder with biliary obstruction but without cholecystitis    History of Present Illness: 47 yo female with sudden onset of RUQ pain last night. No similar history in the past. She did not know she had cholelithiasis. US shows cholelithiasis without evidence of cholecystitis. She had severe pain and N/V after eating a pork chop last night. Labs are WNL.     No current facility-administered medications on file prior to encounter.      Current Outpatient Medications on File Prior to Encounter   Medication Sig    FLUoxetine (PROZAC) 40 MG capsule Take 1 capsule (40 mg total) by mouth once daily.    clotrimazole-betamethasone 1-0.05% (LOTRISONE) cream Apply topically 2 (two) times daily.       Review of patient's allergies indicates:  No Known Allergies    Past Medical History:   Diagnosis Date    Arthritis     Cancer     cervical cancer     Past Surgical History:   Procedure Laterality Date    CERVIX REMOVAL      HYSTERECTOMY      TONSILLECTOMY       Family History     Problem Relation (Age of Onset)    Cancer Father    Hypertension Mother        Tobacco Use    Smoking status: Current Every Day Smoker     Packs/day: 1.00    Smokeless tobacco: Never Used   Substance and Sexual Activity    Alcohol use: No     Frequency: Never    Drug use: No    Sexual activity: Not on file     Review of Systems   Constitutional: Negative for activity change, chills, fever and unexpected weight change.   HENT: Negative  for congestion, sore throat, trouble swallowing and voice change.    Eyes: Negative for redness and visual disturbance.   Respiratory: Negative for cough, shortness of breath and wheezing.    Cardiovascular: Negative for chest pain and palpitations.   Gastrointestinal: Positive for abdominal pain, nausea and vomiting. Negative for blood in stool.   Endocrine: Negative.    Genitourinary: Negative for dysuria, frequency and hematuria.   Musculoskeletal: Negative for arthralgias, back pain and neck pain.   Skin: Negative for rash and wound.   Allergic/Immunologic: Negative.    Neurological: Negative for dizziness, weakness and headaches.   Hematological: Negative for adenopathy.   Psychiatric/Behavioral: Negative for agitation and dysphoric mood. The patient is not nervous/anxious.      Objective:     Vital Signs (Most Recent):  Temp: 99.7 °F (37.6 °C) (09/03/18 1130)  Pulse: 75 (09/03/18 1130)  Resp: 18 (09/03/18 0751)  BP: (!) 107/56 (09/03/18 1130)  SpO2: (!) 94 % (09/03/18 1130) Vital Signs (24h Range):  Temp:  [97.8 °F (36.6 °C)-99.7 °F (37.6 °C)] 99.7 °F (37.6 °C)  Pulse:  [63-75] 75  Resp:  [14-22] 18  SpO2:  [90 %-100 %] 94 %  BP: (103-147)/(54-71) 107/56     Weight: 106 kg (233 lb 11 oz)  Body mass index is 40.11 kg/m².    Physical Exam   Constitutional: She is oriented to person, place, and time. She appears well-developed and well-nourished. No distress.   HENT:   Head: Normocephalic and atraumatic.   Eyes: Conjunctivae and EOM are normal. Pupils are equal, round, and reactive to light. No scleral icterus.   Neck: Normal range of motion. No thyromegaly present.   Cardiovascular: Normal rate and regular rhythm.   No murmur heard.  Pulmonary/Chest: Effort normal and breath sounds normal. She has no wheezes. She has no rales.   Abdominal: Soft. Bowel sounds are normal. She exhibits no distension and no mass. There is tenderness (Significant RUQ and epigatric tenderness.). There is guarding. No hernia.   Obese  abdomen.   Musculoskeletal: Normal range of motion. She exhibits no edema.   Lymphadenopathy:     She has no cervical adenopathy.   Neurological: She is alert and oriented to person, place, and time. No cranial nerve deficit.   Skin: Skin is warm and dry. No rash noted. No erythema.   Psychiatric: She has a normal mood and affect. Her behavior is normal.       Significant Labs:  CBC:   Recent Labs   Lab  09/03/18   0505   WBC  12.50   RBC  4.43   HGB  13.0   HCT  39.3   PLT  331   MCV  89   MCH  29.3   MCHC  33.1     CMP:   Recent Labs   Lab  09/03/18   0505   GLU  121*   CALCIUM  8.7   ALBUMIN  3.3*   PROT  6.4   NA  139   K  5.1   CO2  26   CL  105   BUN  10   CREATININE  0.7   ALKPHOS  77   ALT  38   AST  26   BILITOT  0.2       Significant Diagnostics:  US report reviewed.    Assessment/Plan:     * Calculus of gallbladder with biliary obstruction but without cholecystitis    1. Plan laparoscopic, possible open cholecystectomy.  2. Risks and benefits of the planned procedure were discussed at length with the patient.  Risks and benefits of not proceeding with the procedure were discussed as well. All questions were answered. The patient expressed clear understanding and would like to proceed with the procedure as discussed.          VTE Risk Mitigation (From admission, onward)        Ordered     IP VTE HIGH RISK PATIENT  Once      09/02/18 2319     Place MITCH hose  Until discontinued      09/02/18 2319     Place sequential compression device  Until discontinued      09/02/18 2319          Thank you for your consult. I will follow-up with patient. Please contact us if you have any additional questions.    Wilmer Degroot MD  General Surgery  [facility]

## 2018-09-03 NOTE — ANESTHESIA POSTPROCEDURE EVALUATION
"Anesthesia Post Evaluation    Patient: Gi Abdullahi    Procedure(s) Performed: Procedure(s) (LRB):  CHOLECYSTECTOMY, LAPAROSCOPIC (N/A)    Final Anesthesia Type: general  Patient location during evaluation: PACU  Patient participation: Yes- Able to Participate  Level of consciousness: awake and alert  Post-procedure vital signs: reviewed and stable  Pain management: adequate  Airway patency: patent  PONV status at discharge: No PONV  Anesthetic complications: no      Cardiovascular status: hemodynamically stable  Respiratory status: unassisted and room air  Hydration status: euvolemic  Follow-up not needed.        Visit Vitals  BP (!) 107/56 (BP Location: Right arm, Patient Position: Lying)   Pulse 75   Temp 37.3 °C (99.1 °F) (Temporal)   Resp 18   Ht 5' 4" (1.626 m)   Wt 106 kg (233 lb 11 oz)   SpO2 (!) 94%   Breastfeeding? No   BMI 40.11 kg/m²       Pain/Yahir Score: Pain Assessment Performed: Yes (9/3/2018  3:05 PM)  Presence of Pain: denies (9/3/2018  3:05 PM)  Pain Rating Prior to Med Admin: 0 (9/3/2018  3:05 PM)  Yahir Score: 10 (9/3/2018  3:05 PM)        "

## 2018-09-03 NOTE — OP NOTE
PATIENT NAME:  Gi Abdullahi     DATE OF PROCEDURE: 9/3/2018    PROCEDURE: Laparoscopic Cholecystectomy    PREOPERATIVE DIAGNOSIS: Cholelithiasis / Cholecystitis   POSTOPERATIVE DIAGNOSIS: Cholelithiasis / Cholecystitis     SURGEON: Wilmer Church Jr., M.D.  ASSISTANT: None     DESCRIPTION OF PROCEDURE: After appropriate consent was obtained, consent forms   signed and questions answered, the patient was taken to the Operating Room and   placed on the operating room table where general endotracheal anesthesia was   induced without difficulty. Preoperative antibiotics were administered. SCDs were in   place. A Timeout procedure was performed. The abdomen was prepped and draped in the usual sterile fashion. A midline incision was made beneath the umbilicus. Dissection was performed down to the fascia, which was incised. Stay sutures were placed on the fascia and the Francoise trocar was inserted. The abdomen was insufflated. Under direct   vision and after injection with local anesthetic, a 5 mm trocar was placed in   the upper midline. A second 5 mm trocar was placed between these two. The   gallbladder was identified, grasped, and retracted. There was severe inflammation. The cystic duct was identified and carefully dissected. Three clips   were placed on the common duct side, 2 on the gallbladder side, and the duct was   transected. The artery was identified, dissected, clipped and cut as well. The   gallbladder was then dissected free of the liver bed with electrocautery. It was   placed in a retrieval bag and removed through the umbilical port site. No bile   was spilled. The gallbladder fossa was examined and found to be hemostatic. The trocars were then removed under direct vision.The abdomen was desufflated. The fascia at the umbilicus was closed with interrupted 0 Vicryl suture and additional local was injected. The skin was then closed with 4-0 Monocryl subcuticular stitches. Steri-Strips were then applied  to all incisions. The patient was awakened, extubated and transferred to the Recovery Room in good condition. The patient tolerated the procedure without complication. Lap and instrument counts were reported as correct at the termination of the procedure.    EBL: 15 cc  SPECIMEN: gallbladder  COMPLICATIONS: none  ANESTHESIA: General

## 2018-09-03 NOTE — ASSESSMENT & PLAN NOTE
Requiring IV narcotics/antiemetics for symptomatic relief.  General surgery consulted from ED-- will see in AM.  No evidence of cholestatic process or cholecystitis-- will not initiate IV abx at this time. Keep NPO for possible surgical intervention; IV hydration.

## 2018-09-03 NOTE — HPI
49 yo female with sudden onset of RUQ pain last night. No similar history in the past. She did not know she had cholelithiasis. US shows cholelithiasis without evidence of cholecystitis. She had severe pain and N/V after eating a pork chop last night. Labs are WNL.

## 2018-09-03 NOTE — PLAN OF CARE
Problem: Patient Care Overview  Goal: Plan of Care Review  Outcome: Revised  Received phenergan in ER and a dose of dilaudid when arriving to floor for abd pain.  Has been sleeping/snoring since.   at bedside; good support system  IVF infusing; pt NPO for possible surgery  No nausea/vominting since admit to floor  Monitor am labs  Control pain/nausea

## 2018-09-03 NOTE — PLAN OF CARE
Report to Ghada. Patient in no distress, no pain, n/v voiced.  at bedside. Ambulated to bed from stretcher.

## 2018-09-03 NOTE — HPI
Gi Abdullahi is a 49 yo female with PMHx significant for anxiety/depression.  She was admitted to the service of hospital medicine with cholelithiasis.  She presented to the ED with c/o sudden onset RUQ pain at 2030 while laying down.  She reports eating pork and potatoes for dinner ~ 3 hrs prior.  Pain radiates up chest and to back rated 10/10 at onset and presently.  Pain worsens with shortness of breath/deep breath.  No alleviating factors.  Associated symptoms include non-bloody emesis without nausea.  She denies history of same. She denies any fever, chills, diarrhea, constipation, or other complaints. The patient was evaluated in ER and found to have acute cholecystitis and placed in hospital for further evaluation and treatment.

## 2018-09-03 NOTE — ED PROVIDER NOTES
Encounter Date: 9/2/2018    SCRIBE #1 NOTE: I, Janice Au, am scribing for, and in the presence of, Dr. Diego Pan.       History     Chief Complaint   Patient presents with    Chest Pain     right CP, back pain       Time seen by provider: 9:18 PM on 09/02/2018    Gi Abdullahi is a 48 y.o. female who presents to the ED with an onset of right upper abdomen pain that began 30 minutes ago. The pain radiates to her right upper back. The pt endorses nausea, vomiting, and trouble breathing. The patient denies diarrhea or any other symptoms at this time. She had pork loin and a baked potato for dinner. No pertinent SHx noted. No known drug allergies noted.      The history is provided by the patient and the spouse.     Review of patient's allergies indicates:  No Known Allergies  No past medical history on file.  Past Surgical History:   Procedure Laterality Date    HYSTERECTOMY       No family history on file.  Social History     Tobacco Use    Smoking status: Current Every Day Smoker    Smokeless tobacco: Never Used   Substance Use Topics    Alcohol use: Not on file    Drug use: Not on file     Review of Systems   Constitutional: Negative for fever.   HENT: Negative for sore throat.    Respiratory: Negative for shortness of breath.         Positive for trouble breathing.   Cardiovascular: Negative for chest pain.   Gastrointestinal: Positive for abdominal pain, nausea and vomiting. Negative for diarrhea.        Positive for right upper abdomen pain.   Genitourinary: Negative for dysuria.   Musculoskeletal: Positive for back pain.        Positive for right upper back pain.    Skin: Negative for rash.   Neurological: Negative for weakness.   Hematological: Does not bruise/bleed easily.       Physical Exam     Initial Vitals [09/02/18 2104]   BP Pulse Resp Temp SpO2   (!) 141/68 68 (!) 22 98.1 °F (36.7 °C) 99 %      MAP       --         Physical Exam    Nursing note and vitals reviewed.  Constitutional:  She appears well-developed and well-nourished. She is not diaphoretic. No distress.   HENT:   Head: Normocephalic and atraumatic.   Eyes: EOM are normal. Pupils are equal, round, and reactive to light.   Neck: Normal range of motion. Neck supple.   Cardiovascular: Normal rate, regular rhythm, normal heart sounds and intact distal pulses. Exam reveals no gallop and no friction rub.    No murmur heard.  Pulmonary/Chest: Breath sounds normal. No respiratory distress. She has no wheezes. She has no rhonchi. She has no rales.   Abdominal: Soft. Bowel sounds are normal. There is tenderness in the right upper quadrant. There is no rebound and no guarding.   Musculoskeletal: Normal range of motion.   Neurological: She is alert and oriented to person, place, and time.   Skin: Skin is warm and dry.   Psychiatric: She has a normal mood and affect. Her behavior is normal. Judgment and thought content normal.         ED Course   Procedures  Labs Reviewed   CBC W/ AUTO DIFFERENTIAL - Abnormal; Notable for the following components:       Result Value    Platelets 368 (*)     MPV 7.9 (*)     Basophil% 2.3 (*)     All other components within normal limits   COMPREHENSIVE METABOLIC PANEL   B-TYPE NATRIURETIC PEPTIDE   TROPONIN I     EKG Readings: (Independently Interpreted)   Initial Reading: No STEMI.   Normal sinus rhythm. Rate of 76 bpm. Normal axis. Normal intervals. No acute ST segment changes.        Imaging Results          US Abdomen Limited (In process)                X-Ray Chest 1 View (In process)               X-Rays:   Independently Interpreted Readings:   Other Readings:  Normal heart size. No infiltrations. No pneumothorax.    Medical Decision Making:   History:   Old Medical Records: I decided to obtain old medical records.  Initial Assessment:   48-year-old female presented with a chief complaint of right upper quadrant abdominal pain.  Differential Diagnosis:   Initial differential diagnosis included but not limited  to cholecystitis, cholelithiasis, gastritis, and pancreatitis.  Clinical Tests:   Lab Tests: Ordered and Reviewed  Radiological Study: Ordered and Reviewed  Medical Tests: Ordered and Reviewed  ED Management:  The patient was emergently evaluated in the emergency department, her evaluation was significant for a middle-age female with right upper quadrant tenderness to palpation. The patient's labs showed no acute abnormalities.  The patient's EKG showed no acute abnormalities per my independent interpretation.  The patient's chest x-ray showed no acute processes per my independent interpretation.  The patient's ultrasound was concerning for a stone stuck in the neck of her gallbladder.  The patient was aggressively treated with IV morphine and IV Zofran.  I discussed the case with the general surgeon on call, Dr. Church.  I will admit her to the hospitalist service further care.  I did discuss case with the APC on call for the hospitalist service.  She has accepted the patient for admission.  General surgery will follow along in consultation and see the patient in the morning.  Other:   I have discussed this case with another health care provider.            Scribe Attestation:   Scribe #1: I performed the above scribed service and the documentation accurately describes the services I performed. I attest to the accuracy of the note.        I, Dr. Diego Pan, personally performed the services described in this documentation. All medical record entries made by the scribe were at my direction and in my presence.  I have reviewed the chart and agree that the record reflects my personal performance and is accurate and complete. Diego Pan MD.  10:32 PM 09/02/2018          Clinical Impression:   The primary encounter diagnosis was Calculus of gallbladder with biliary obstruction but without cholecystitis. A diagnosis of Chest pain was also pertinent to this visit.                             Diego Pan,  MD  09/02/18 8225

## 2018-09-03 NOTE — PROGRESS NOTES
[facility] Hospital Medicine  Progress Note    Patient Name: Gi Abdullahi  MRN: [id number]  Patient Class: OP- Observation   Admission Date: 9/2/2018  Length of Stay: 0 days  Attending Physician: Jero Lloyd MD  Primary Care Provider: Cain Cristobal MD        Subjective:     Principal Problem:Calculus of gallbladder with biliary obstruction but without cholecystitis    HPI:  Gi Abdullahi is a 49 yo female with PMHx significant for anxiety/depression.  She was admitted to the service of hospital medicine with cholelithiasis.  She presented to the ED with c/o sudden onset RUQ pain at 2030 while laying down.  She reports eating pork and potatoes for dinner ~ 3 hrs prior.  Pain radiates up chest and to back rated 10/10 at onset and presently.  Pain worsens with shortness of breath/deep breath.  No alleviating factors.  Associated symptoms include non-bloody emesis without nausea.  She denies history of same.  She denies any fever, chills, diarrhea, constipation, or other complaints. Other significant PMHx as below:    Hospital Course:  No notes on file    Interval History: No new issues. Still nausea, vomiting, and RUQ pain. Awaiting surgery     Review of Systems   Constitutional: Positive for appetite change and fatigue. Negative for diaphoresis and fever.   Respiratory: Negative for cough and shortness of breath.    Cardiovascular: Negative for chest pain and leg swelling.   Gastrointestinal: Positive for abdominal pain, nausea and vomiting. Negative for abdominal distention.   Skin: Negative for rash.   Neurological: Negative for speech difficulty and numbness.     Objective:     Vital Signs (Most Recent):  Temp: 98 °F (36.7 °C) (09/03/18 1525)  Pulse: 66 (09/03/18 1525)  Resp: 12 (09/03/18 1525)  BP: (!) 98/55 (09/03/18 1525)  SpO2: (!) 94 % (09/03/18 1525) Vital Signs (24h Range):  Temp:  [97.8 °F (36.6 °C)-99.7 °F (37.6 °C)] 98 °F (36.7 °C)  Pulse:  [63-75] 66  Resp:  [12-22] 12  SpO2:   [90 %-100 %] 94 %  BP: ()/(54-71) 98/55     Weight: 106 kg (233 lb 11 oz)  Body mass index is 40.11 kg/m².    Intake/Output Summary (Last 24 hours) at 9/3/2018 1605  Last data filed at 9/3/2018 1425  Gross per 24 hour   Intake 1941.67 ml   Output 2 ml   Net 1939.67 ml      Physical Exam   Constitutional: She is oriented to person, place, and time. She appears well-developed and well-nourished.   HENT:   Head: Normocephalic and atraumatic.   Mouth/Throat: Oropharynx is clear and moist.   Eyes: Conjunctivae and EOM are normal. Pupils are equal, round, and reactive to light.   Neck: Normal range of motion. Neck supple.   Cardiovascular: Normal rate, regular rhythm, normal heart sounds and intact distal pulses.   No murmur heard.  Pulmonary/Chest: Effort normal and breath sounds normal. She has no wheezes.   Abdominal: Soft. Bowel sounds are normal. There is tenderness.   RUQ tenderness, abdomen obese   Musculoskeletal: Normal range of motion.   Neurological: She is alert and oriented to person, place, and time.   Skin: Skin is warm and dry.   Psychiatric: She has a normal mood and affect. Her behavior is normal. Judgment normal.   Nursing note and vitals reviewed.      Significant Labs:   CBC:   Recent Labs   Lab  09/02/18 2125 09/03/18   0505   WBC  10.20  12.50   HGB  14.1  13.0   HCT  43.9  39.3   PLT  368*  331     CMP:   Recent Labs   Lab  09/02/18 2125 09/03/18   0505   NA  139  139   K  4.0  5.1   CL  103  105   CO2  26  26   GLU  104  121*   BUN  9  10   CREATININE  0.8  0.7   CALCIUM  9.9  8.7   PROT  7.0  6.4   ALBUMIN  3.6  3.3*   BILITOT  0.2  0.2   ALKPHOS  82  77   AST  16  26   ALT  27  38   ANIONGAP  10  8   EGFRNONAA  >60  >60       Significant Imaging: I have reviewed and interpreted all pertinent imaging results/findings within the past 24 hours.    Assessment/Plan:      * Calculus of gallbladder with biliary obstruction but without cholecystitis    Requiring IV narcotics/antiemetics for  symptomatic relief.  General surgery consulted from ED-- will see in AM.  No evidence of cholestatic process or cholecystitis-- will not initiate IV abx at this time. Keep NPO for possible surgical intervention; IV hydration.  Lap bethel today. Initiated on Zosyn        Morbidly obese    Body mass index is 40.11 kg/m².  General weight loss/lifestyle modification strategies discussed (elicit support from others; identify saboteurs; non-food rewards, etc).            Nicotine dependence with current use    Health hazards associated with cigarette smoking were reviewed with patient and cessation was encouraged. Nicotine replacement and counseling options were discussed.  Spent 3 minutes counseling on cessation, patient not ready to quit.  Agreeable to nicotine patch.          Mild episode of recurrent major depressive disorder    Chronic issue, controlled on current medication.  Continue prozac.             VTE Risk Mitigation (From admission, onward)        Ordered     enoxaparin injection 40 mg  Daily      09/03/18 1538     IP VTE HIGH RISK PATIENT  Once      09/02/18 2319     Place MITCH hose  Until discontinued      09/02/18 2319     Place sequential compression device  Until discontinued      09/02/18 2319          Discussed POC with patient and .     Addendum: 1600 Discussed with Dr. Degroot. Gallbladder with infection and necrosis. Will keep patient on zosyn overnight.     Harmony Palumbo NP  Department of Hospital Medicine   [facility]

## 2018-09-03 NOTE — ASSESSMENT & PLAN NOTE
1. Plan laparoscopic, possible open cholecystectomy.  2. Risks and benefits of the planned procedure were discussed at length with the patient.  Risks and benefits of not proceeding with the procedure were discussed as well. All questions were answered. The patient expressed clear understanding and would like to proceed with the procedure as discussed.

## 2018-09-03 NOTE — ASSESSMENT & PLAN NOTE
Body mass index is 40.11 kg/m².  General weight loss/lifestyle modification strategies discussed (elicit support from others; identify saboteurs; non-food rewards, etc).

## 2018-09-03 NOTE — ANESTHESIA PREPROCEDURE EVALUATION
09/03/2018  Gi Abdullahi is a 48 y.o., female.    Anesthesia Evaluation    I have reviewed the Patient Summary Reports.    I have reviewed the Nursing Notes.   I have reviewed the Medications.     Review of Systems  Anesthesia Hx:  No problems with previous Anesthesia    Social:  Smoker    Hematology/Oncology:         -- Cancer in past history: surgery    EENT/Dental:EENT/Dental Normal   Cardiovascular:  Cardiovascular Normal     Pulmonary:  Pulmonary Normal    Renal/:  Renal/ Normal     Hepatic/GI:   Gallbladder calculus    Musculoskeletal:   Arthritis     Neurological:  Neurology Normal    Endocrine:  Endocrine Normal    Dermatological:  Skin Normal    Psych:   Psychiatric History anxiety depression          Physical Exam  General:  Morbid Obesity    Airway/Jaw/Neck:  Airway Findings: Mouth Opening: Normal Tongue: Normal  General Airway Assessment: Adult  Mallampati: II  TM Distance: Normal, at least 6 cm        Eyes/Ears/Nose:  EYES/EARS/NOSE FINDINGS: Normal   Dental:  DENTAL FINDINGS: Normal   Chest/Lungs:  Chest/Lungs Findings: Clear to auscultation, Normal Respiratory Rate     Heart/Vascular:  Heart Findings: Rate: Normal  Rhythm: Regular Rhythm  Heart Murmur  Systolic  Systolic Heart Murmur Description: L Upper Sternal Border  Systolic Heart Murmur Grade: Grade II        Mental Status:  Mental Status Findings:  Cooperative, Alert and Oriented         Anesthesia Plan  Type of Anesthesia, risks & benefits discussed:  Anesthesia Type:  general  Patient's Preference:   Intra-op Monitoring Plan: standard ASA monitors  Intra-op Monitoring Plan Comments:   Post Op Pain Control Plan: IV/PO Opioids PRN and multimodal analgesia  Post Op Pain Control Plan Comments:   Induction:   IV  Beta Blocker:  Patient is not currently on a Beta-Blocker (No further documentation required).       Informed Consent:  Patient understands risks and agrees with Anesthesia plan.  Questions answered. Anesthesia consent signed with patient.  ASA Score: 3     Day of Surgery Review of History & Physical:    H&P update referred to the surgeon.         Ready For Surgery From Anesthesia Perspective.

## 2018-09-03 NOTE — H&P
[facility] Hospital Medicine  History & Physical    Patient Name: Gi Abdullahi  MRN: [id number]  Admission Date: 9/2/2018  Attending Physician: Verito Garcia MD   Primary Care Provider: Cain Cristobal MD         Patient information was obtained from patient, spouse/SO and ER records.     Subjective:     Principal Problem:Calculus of gallbladder with biliary obstruction but without cholecystitis    Chief Complaint:   Chief Complaint   Patient presents with    Chest Pain     right CP, back pain        HPI: Gi Abdullahi is a 47 yo female with PMHx significant for anxiety/depression.  She was admitted to the service of hospital medicine with cholelithiasis.  She presented to the ED with c/o sudden onset RUQ pain at 2030 while laying down.  She reports eating pork and potatoes for dinner ~ 3 hrs prior.  Pain radiates up chest and to back rated 10/10 at onset and presently.  Pain worsens with shortness of breath/deep breath.  No alleviating factors.  Associated symptoms include non-bloody emesis without nausea.  She denies history of same.  She denies any fever, chills, diarrhea, constipation, or other complaints. Other significant PMHx as below:    History reviewed. No pertinent past medical history.    Past Surgical History:   Procedure Laterality Date    HYSTERECTOMY         Review of patient's allergies indicates:  No Known Allergies    No current facility-administered medications on file prior to encounter.      Current Outpatient Medications on File Prior to Encounter   Medication Sig    clotrimazole-betamethasone 1-0.05% (LOTRISONE) cream Apply topically 2 (two) times daily.    FLUoxetine (PROZAC) 40 MG capsule Take 1 capsule (40 mg total) by mouth once daily.     Family History     Problem Relation (Age of Onset)    Cancer Father    Hypertension Mother        Tobacco Use    Smoking status: Current Every Day Smoker     Packs/day: 1.00    Smokeless tobacco: Never Used   Substance and  Sexual Activity    Alcohol use: No     Frequency: Never    Drug use: No    Sexual activity: Not on file     Review of Systems   Constitutional: Negative for activity change, appetite change, chills, fatigue and fever.   HENT: Negative for congestion, postnasal drip, sinus pressure, sore throat and trouble swallowing.    Eyes: Negative for photophobia and visual disturbance.   Respiratory: Positive for shortness of breath (when pain is severe). Negative for cough and wheezing.    Cardiovascular: Positive for chest pain (radiates to chest). Negative for palpitations and leg swelling.   Gastrointestinal: Positive for abdominal pain and nausea. Negative for constipation, diarrhea and vomiting.   Genitourinary: Negative for dysuria, flank pain and hematuria.   Musculoskeletal: Positive for back pain. Negative for arthralgias and myalgias.   Skin: Negative for color change.   Neurological: Negative for dizziness, weakness, light-headedness and headaches.   Psychiatric/Behavioral: Negative for confusion. The patient is not nervous/anxious.      Objective:     Vital Signs (Most Recent):  Temp: 98.1 °F (36.7 °C) (09/02/18 2104)  Pulse: 67 (09/02/18 2243)  Resp: (!) 22 (09/02/18 2104)  BP: (!) 147/69 (09/02/18 2126)  SpO2: (!) 93 % (09/02/18 2243) Vital Signs (24h Range):  Temp:  [98.1 °F (36.7 °C)] 98.1 °F (36.7 °C)  Pulse:  [63-69] 67  Resp:  [22] 22  SpO2:  [90 %-100 %] 93 %  BP: (141-147)/(68-69) 147/69     Weight: 96.6 kg (213 lb)  Body mass index is 34.38 kg/m².    Physical Exam   Constitutional: She is oriented to person, place, and time. She appears well-developed and well-nourished. No distress.   HENT:   Head: Normocephalic and atraumatic.   Eyes: Conjunctivae and EOM are normal. Pupils are equal, round, and reactive to light.   Neck: Normal range of motion. Neck supple. No thyromegaly present.   Cardiovascular: Normal rate, regular rhythm, normal heart sounds and intact distal pulses.   No murmur  heard.  Pulmonary/Chest: Effort normal and breath sounds normal. No respiratory distress.   Abdominal: Soft. Bowel sounds are normal. She exhibits no distension. There is tenderness (significant RUQ tenderness). There is guarding.   Musculoskeletal: Normal range of motion. She exhibits no edema or tenderness.   Neurological: She is alert and oriented to person, place, and time. No cranial nerve deficit.   Skin: Skin is warm and dry. Capillary refill takes less than 2 seconds. No erythema.   Psychiatric: She has a normal mood and affect. Her behavior is normal. Judgment and thought content normal.         CRANIAL NERVES     CN III, IV, VI   Pupils are equal, round, and reactive to light.  Extraocular motions are normal.        Significant Labs:   CBC:   Recent Labs   Lab  09/02/18 2125   WBC  10.20   HGB  14.1   HCT  43.9   PLT  368*     CMP:   Recent Labs   Lab  09/02/18 2125   NA  139   K  4.0   CL  103   CO2  26   GLU  104   BUN  9   CREATININE  0.8   CALCIUM  9.9   PROT  7.0   ALBUMIN  3.6   BILITOT  0.2   ALKPHOS  82   AST  16   ALT  27   ANIONGAP  10   EGFRNONAA  >60     Troponin:   Recent Labs   Lab  09/02/18 2125   TROPONINI  <0.006       Significant Imaging:  EKG: I reviewed.  Sinus arrthymia with no ischemic ST or T wave abnormalities.  Rate 76.    Abd U/S (VRAD): Cholelithiasis, with a 2 cm stone within the gallbladder neck that appears immobile. There are no ultrasound findings of acute cholecystitis, however. Further evaluation with hepatobiliary scintigraphy is recommended, if clinically indicated.    Assessment/Plan:     * Calculus of gallbladder with biliary obstruction but without cholecystitis    Requiring IV narcotics/antiemetics for symptomatic relief.  General surgery consulted from ED-- will see in AM.  No evidence of cholestatic process or cholecystitis-- will not initiate IV abx at this time. Keep NPO for possible surgical intervention; IV hydration.        Anxiety and depression     Chronic issue, controlled on current medication.  Continue prozac.           Nicotine dependence with current use    Health hazards associated with cigarette smoking were reviewed with patient and cessation was encouraged. Nicotine replacement and counseling options were discussed.  Spent 3 minutes counseling on cessation, patient not ready to quit.  Agreeable to nicotine patch.            VTE Risk Mitigation (From admission, onward)        Ordered     IP VTE HIGH RISK PATIENT  Once      09/02/18 2319     Place MITCH hose  Until discontinued      09/02/18 2319     Place sequential compression device  Until discontinued      09/02/18 2319             Chasity Valdez NP  Department of Hospital Medicine   [facility]

## 2018-09-03 NOTE — ASSESSMENT & PLAN NOTE
Health hazards associated with cigarette smoking were reviewed with patient and cessation was encouraged. Nicotine replacement and counseling options were discussed.  Spent 3 minutes counseling on cessation, patient not ready to quit.  Agreeable to nicotine patch.

## 2018-09-03 NOTE — PLAN OF CARE
Patient lives with spouse, Demarcus [phone],  denies POA, living will or home health services. Patient reports independence at home, pharmacy is [facility], PCP is Dr. Cristobal. Patient plans to return home with no needs.      09/03/18 0930   Discharge Assessment   Assessment Type Discharge Planning Assessment   Confirmed/corrected address and phone number on facesheet? Yes   Assessment information obtained from? Patient;Caregiver   Communicated expected length of stay with patient/caregiver yes   Prior to hospitilization cognitive status: Alert/Oriented   Prior to hospitalization functional status: Independent   Current cognitive status: Alert/Oriented   Current Functional Status: Independent   Lives With spouse   Able to Return to Prior Arrangements yes   Is patient able to care for self after discharge? Yes   Readmission Within The Last 30 Days no previous admission in last 30 days   Patient currently being followed by outpatient case management? No   Patient currently receives any other outside agency services? No   Equipment Currently Used at Home none   Do you have any problems affording any of your prescribed medications? No   Is the patient taking medications as prescribed? yes   Does the patient have transportation home? Yes   Transportation Available family or friend will provide   Does the patient receive services at the [facility]? No   Discharge Plan A Home   Patient/Family In Agreement With Plan yes

## 2018-09-03 NOTE — SUBJECTIVE & OBJECTIVE
No current facility-administered medications on file prior to encounter.      Current Outpatient Medications on File Prior to Encounter   Medication Sig    FLUoxetine (PROZAC) 40 MG capsule Take 1 capsule (40 mg total) by mouth once daily.    clotrimazole-betamethasone 1-0.05% (LOTRISONE) cream Apply topically 2 (two) times daily.       Review of patient's allergies indicates:  No Known Allergies    Past Medical History:   Diagnosis Date    Arthritis     Cancer     cervical cancer     Past Surgical History:   Procedure Laterality Date    CERVIX REMOVAL      HYSTERECTOMY      TONSILLECTOMY       Family History     Problem Relation (Age of Onset)    Cancer Father    Hypertension Mother        Tobacco Use    Smoking status: Current Every Day Smoker     Packs/day: 1.00    Smokeless tobacco: Never Used   Substance and Sexual Activity    Alcohol use: No     Frequency: Never    Drug use: No    Sexual activity: Not on file     Review of Systems   Constitutional: Negative for activity change, chills, fever and unexpected weight change.   HENT: Negative for congestion, sore throat, trouble swallowing and voice change.    Eyes: Negative for redness and visual disturbance.   Respiratory: Negative for cough, shortness of breath and wheezing.    Cardiovascular: Negative for chest pain and palpitations.   Gastrointestinal: Positive for abdominal pain, nausea and vomiting. Negative for blood in stool.   Endocrine: Negative.    Genitourinary: Negative for dysuria, frequency and hematuria.   Musculoskeletal: Negative for arthralgias, back pain and neck pain.   Skin: Negative for rash and wound.   Allergic/Immunologic: Negative.    Neurological: Negative for dizziness, weakness and headaches.   Hematological: Negative for adenopathy.   Psychiatric/Behavioral: Negative for agitation and dysphoric mood. The patient is not nervous/anxious.      Objective:     Vital Signs (Most Recent):  Temp: 99.7 °F (37.6 °C) (09/03/18  1130)  Pulse: 75 (09/03/18 1130)  Resp: 18 (09/03/18 0751)  BP: (!) 107/56 (09/03/18 1130)  SpO2: (!) 94 % (09/03/18 1130) Vital Signs (24h Range):  Temp:  [97.8 °F (36.6 °C)-99.7 °F (37.6 °C)] 99.7 °F (37.6 °C)  Pulse:  [63-75] 75  Resp:  [14-22] 18  SpO2:  [90 %-100 %] 94 %  BP: (103-147)/(54-71) 107/56     Weight: 106 kg (233 lb 11 oz)  Body mass index is 40.11 kg/m².    Physical Exam   Constitutional: She is oriented to person, place, and time. She appears well-developed and well-nourished. No distress.   HENT:   Head: Normocephalic and atraumatic.   Eyes: Conjunctivae and EOM are normal. Pupils are equal, round, and reactive to light. No scleral icterus.   Neck: Normal range of motion. No thyromegaly present.   Cardiovascular: Normal rate and regular rhythm.   No murmur heard.  Pulmonary/Chest: Effort normal and breath sounds normal. She has no wheezes. She has no rales.   Abdominal: Soft. Bowel sounds are normal. She exhibits no distension and no mass. There is tenderness (Significant RUQ and epigatric tenderness.). There is guarding. No hernia.   Obese abdomen.   Musculoskeletal: Normal range of motion. She exhibits no edema.   Lymphadenopathy:     She has no cervical adenopathy.   Neurological: She is alert and oriented to person, place, and time. No cranial nerve deficit.   Skin: Skin is warm and dry. No rash noted. No erythema.   Psychiatric: She has a normal mood and affect. Her behavior is normal.       Significant Labs:  CBC:   Recent Labs   Lab  09/03/18   0505   WBC  12.50   RBC  4.43   HGB  13.0   HCT  39.3   PLT  331   MCV  89   MCH  29.3   MCHC  33.1     CMP:   Recent Labs   Lab  09/03/18   0505   GLU  121*   CALCIUM  8.7   ALBUMIN  3.3*   PROT  6.4   NA  139   K  5.1   CO2  26   CL  105   BUN  10   CREATININE  0.7   ALKPHOS  77   ALT  38   AST  26   BILITOT  0.2       Significant Diagnostics:  US report reviewed.

## 2018-09-03 NOTE — TRANSFER OF CARE
"Anesthesia Transfer of Care Note    Patient: Gi Abdullahi    Procedure(s) Performed: Procedure(s) (LRB):  CHOLECYSTECTOMY, LAPAROSCOPIC (N/A)    Patient location: PACU    Anesthesia Type: general    Transport from OR: Transported from OR on 2-3 L/min O2 by NC with adequate spontaneous ventilation    Post pain: adequate analgesia    Post assessment: no apparent anesthetic complications and tolerated procedure well    Post vital signs: stable    Level of consciousness: awake, alert and oriented    Nausea/Vomiting: no nausea/vomiting    Complications: none    Transfer of care protocol was followed      Last vitals:   Visit Vitals  BP (!) 107/56 (BP Location: Right arm, Patient Position: Lying)   Pulse 75   Temp 37.6 °C (99.7 °F) (Skin)   Resp 18   Ht 5' 4" (1.626 m)   Wt 106 kg (233 lb 11 oz)   SpO2 (!) 94%   Breastfeeding? No   BMI 40.11 kg/m²     "

## 2018-09-03 NOTE — PLAN OF CARE
Problem: Patient Care Overview  Goal: Plan of Care Review  Outcome: Ongoing (interventions implemented as appropriate)  Fall and safety precautions maintained.  Patient alert and oriented, resting in bed after returning from surgery.  Denies pain or SOB. VSS, Pt in NAD.  Plan of care reviewed with patient. Verbalizes understanding.  Call light in reach, bed in low position and wheels locked. Will continue to monitor.  Family bedside and attentive to Pt.

## 2018-09-03 NOTE — SUBJECTIVE & OBJECTIVE
Interval History: No new issues. Still nausea, vomiting, and RUQ pain. Awaiting surgery     Review of Systems   Constitutional: Positive for appetite change and fatigue. Negative for diaphoresis and fever.   Respiratory: Negative for cough and shortness of breath.    Cardiovascular: Negative for chest pain and leg swelling.   Gastrointestinal: Positive for abdominal pain, nausea and vomiting. Negative for abdominal distention.   Skin: Negative for rash.   Neurological: Negative for speech difficulty and numbness.     Objective:     Vital Signs (Most Recent):  Temp: 98 °F (36.7 °C) (09/03/18 1525)  Pulse: 66 (09/03/18 1525)  Resp: 12 (09/03/18 1525)  BP: (!) 98/55 (09/03/18 1525)  SpO2: (!) 94 % (09/03/18 1525) Vital Signs (24h Range):  Temp:  [97.8 °F (36.6 °C)-99.7 °F (37.6 °C)] 98 °F (36.7 °C)  Pulse:  [63-75] 66  Resp:  [12-22] 12  SpO2:  [90 %-100 %] 94 %  BP: ()/(54-71) 98/55     Weight: 106 kg (233 lb 11 oz)  Body mass index is 40.11 kg/m².    Intake/Output Summary (Last 24 hours) at 9/3/2018 1605  Last data filed at 9/3/2018 1425  Gross per 24 hour   Intake 1941.67 ml   Output 2 ml   Net 1939.67 ml      Physical Exam   Constitutional: She is oriented to person, place, and time. She appears well-developed and well-nourished.   HENT:   Head: Normocephalic and atraumatic.   Mouth/Throat: Oropharynx is clear and moist.   Eyes: Conjunctivae and EOM are normal. Pupils are equal, round, and reactive to light.   Neck: Normal range of motion. Neck supple.   Cardiovascular: Normal rate, regular rhythm, normal heart sounds and intact distal pulses.   No murmur heard.  Pulmonary/Chest: Effort normal and breath sounds normal. She has no wheezes.   Abdominal: Soft. Bowel sounds are normal. There is tenderness.   RUQ tenderness, abdomen obese   Musculoskeletal: Normal range of motion.   Neurological: She is alert and oriented to person, place, and time.   Skin: Skin is warm and dry.   Psychiatric: She has a normal  mood and affect. Her behavior is normal. Judgment normal.   Nursing note and vitals reviewed.      Significant Labs:   CBC:   Recent Labs   Lab  09/02/18 2125 09/03/18   0505   WBC  10.20  12.50   HGB  14.1  13.0   HCT  43.9  39.3   PLT  368*  331     CMP:   Recent Labs   Lab  09/02/18 2125 09/03/18   0505   NA  139  139   K  4.0  5.1   CL  103  105   CO2  26  26   GLU  104  121*   BUN  9  10   CREATININE  0.8  0.7   CALCIUM  9.9  8.7   PROT  7.0  6.4   ALBUMIN  3.6  3.3*   BILITOT  0.2  0.2   ALKPHOS  82  77   AST  16  26   ALT  27  38   ANIONGAP  10  8   EGFRNONAA  >60  >60       Significant Imaging: I have reviewed and interpreted all pertinent imaging results/findings within the past 24 hours.

## 2018-09-04 PROBLEM — K80.00 CALCULUS OF GALLBLADDER WITH ACUTE CHOLECYSTITIS WITHOUT OBSTRUCTION: Status: ACTIVE | Noted: 2018-09-02

## 2018-09-04 LAB
ALBUMIN SERPL BCP-MCNC: 3.1 G/DL
ALP SERPL-CCNC: 159 U/L
ALT SERPL W/O P-5'-P-CCNC: 529 U/L
ANION GAP SERPL CALC-SCNC: 8 MMOL/L
AST SERPL-CCNC: 349 U/L
BASOPHILS # BLD AUTO: 0.1 K/UL
BASOPHILS NFR BLD: 0.8 %
BILIRUB SERPL-MCNC: 0.6 MG/DL
BUN SERPL-MCNC: 9 MG/DL
CALCIUM SERPL-MCNC: 8.5 MG/DL
CHLORIDE SERPL-SCNC: 105 MMOL/L
CO2 SERPL-SCNC: 22 MMOL/L
CREAT SERPL-MCNC: 0.7 MG/DL
DIFFERENTIAL METHOD: ABNORMAL
EOSINOPHIL # BLD AUTO: 0 K/UL
EOSINOPHIL NFR BLD: 0.2 %
ERYTHROCYTE [DISTWIDTH] IN BLOOD BY AUTOMATED COUNT: 14.5 %
EST. GFR  (AFRICAN AMERICAN): >60 ML/MIN/1.73 M^2
EST. GFR  (NON AFRICAN AMERICAN): >60 ML/MIN/1.73 M^2
GLUCOSE SERPL-MCNC: 110 MG/DL
HCT VFR BLD AUTO: 37.5 %
HGB BLD-MCNC: 12.2 G/DL
LYMPHOCYTES # BLD AUTO: 2.2 K/UL
LYMPHOCYTES NFR BLD: 17.2 %
MAGNESIUM SERPL-MCNC: 2 MG/DL
MCH RBC QN AUTO: 29.3 PG
MCHC RBC AUTO-ENTMCNC: 32.6 G/DL
MCV RBC AUTO: 90 FL
MONOCYTES # BLD AUTO: 0.8 K/UL
MONOCYTES NFR BLD: 6.7 %
NEUTROPHILS # BLD AUTO: 9.4 K/UL
NEUTROPHILS NFR BLD: 75.1 %
PHOSPHATE SERPL-MCNC: 3.1 MG/DL
PLATELET # BLD AUTO: 309 K/UL
PMV BLD AUTO: 8.2 FL
POTASSIUM SERPL-SCNC: 4.2 MMOL/L
PROT SERPL-MCNC: 6.2 G/DL
RBC # BLD AUTO: 4.16 M/UL
SODIUM SERPL-SCNC: 135 MMOL/L
WBC # BLD AUTO: 12.6 K/UL

## 2018-09-04 PROCEDURE — 25000003 PHARM REV CODE 250: Performed by: NURSE PRACTITIONER

## 2018-09-04 PROCEDURE — 63600175 PHARM REV CODE 636 W HCPCS: Performed by: NURSE PRACTITIONER

## 2018-09-04 PROCEDURE — 96376 TX/PRO/DX INJ SAME DRUG ADON: CPT

## 2018-09-04 PROCEDURE — 85025 COMPLETE CBC W/AUTO DIFF WBC: CPT

## 2018-09-04 PROCEDURE — 36415 COLL VENOUS BLD VENIPUNCTURE: CPT

## 2018-09-04 PROCEDURE — 83735 ASSAY OF MAGNESIUM: CPT

## 2018-09-04 PROCEDURE — 11000001 HC ACUTE MED/SURG PRIVATE ROOM

## 2018-09-04 PROCEDURE — 84100 ASSAY OF PHOSPHORUS: CPT

## 2018-09-04 PROCEDURE — 80053 COMPREHEN METABOLIC PANEL: CPT

## 2018-09-04 PROCEDURE — 94761 N-INVAS EAR/PLS OXIMETRY MLT: CPT

## 2018-09-04 PROCEDURE — 63600175 PHARM REV CODE 636 W HCPCS: Performed by: SURGERY

## 2018-09-04 PROCEDURE — 96366 THER/PROPH/DIAG IV INF ADDON: CPT

## 2018-09-04 PROCEDURE — 27000221 HC OXYGEN, UP TO 24 HOURS

## 2018-09-04 PROCEDURE — 96374 THER/PROPH/DIAG INJ IV PUSH: CPT

## 2018-09-04 RX ADMIN — PIPERACILLIN AND TAZOBACTAM 3.38 G: 3; .375 INJECTION, POWDER, LYOPHILIZED, FOR SOLUTION INTRAVENOUS; PARENTERAL at 10:09

## 2018-09-04 RX ADMIN — SODIUM CHLORIDE: 0.9 INJECTION, SOLUTION INTRAVENOUS at 06:09

## 2018-09-04 RX ADMIN — PIPERACILLIN AND TAZOBACTAM 3.38 G: 3; .375 INJECTION, POWDER, LYOPHILIZED, FOR SOLUTION INTRAVENOUS; PARENTERAL at 06:09

## 2018-09-04 RX ADMIN — STANDARDIZED SENNA CONCENTRATE AND DOCUSATE SODIUM 1 TABLET: 8.6; 5 TABLET, FILM COATED ORAL at 10:09

## 2018-09-04 RX ADMIN — SODIUM CHLORIDE: 0.9 INJECTION, SOLUTION INTRAVENOUS at 05:09

## 2018-09-04 RX ADMIN — HYDROCODONE BITARTRATE AND ACETAMINOPHEN 1 TABLET: 10; 325 TABLET ORAL at 10:09

## 2018-09-04 RX ADMIN — FLUOXETINE 40 MG: 20 CAPSULE ORAL at 10:09

## 2018-09-04 RX ADMIN — ENOXAPARIN SODIUM 40 MG: 100 INJECTION SUBCUTANEOUS at 10:09

## 2018-09-04 RX ADMIN — HYDROMORPHONE HYDROCHLORIDE 1 MG: 2 INJECTION INTRAMUSCULAR; INTRAVENOUS; SUBCUTANEOUS at 03:09

## 2018-09-04 RX ADMIN — HYDROCODONE BITARTRATE AND ACETAMINOPHEN 1 TABLET: 10; 325 TABLET ORAL at 04:09

## 2018-09-04 NOTE — PROGRESS NOTES
[facility] Hospital Medicine  Progress Note    Patient Name: Gi Abdullahi  MRN: [id number]  Patient Class: OP- Observation   Admission Date: 9/2/2018  Length of Stay: 0 days  Attending Physician: Jero Lloyd MD  Primary Care Provider: Cain Cristobal MD        Subjective:     Principal Problem:Calculus of gallbladder with acute cholecystitis without obstruction    HPI:  Gi Abdullahi is a 49 yo female with PMHx significant for anxiety/depression.  She was admitted to the service of hospital medicine with cholelithiasis.  She presented to the ED with c/o sudden onset RUQ pain at 2030 while laying down.  She reports eating pork and potatoes for dinner ~ 3 hrs prior.  Pain radiates up chest and to back rated 10/10 at onset and presently.  Pain worsens with shortness of breath/deep breath.  No alleviating factors.  Associated symptoms include non-bloody emesis without nausea.  She denies history of same.  She denies any fever, chills, diarrhea, constipation, or other complaints. Other significant PMHx as below:    Hospital Course:  No notes on file    Interval History: significantly elevated LFTs this am. Sitting up in chair. Post op pain controlled with meds. Tolerating diet.     Review of Systems   Constitutional: Positive for appetite change and fatigue. Negative for diaphoresis and fever.   Respiratory: Negative for cough and shortness of breath.    Cardiovascular: Negative for chest pain and leg swelling.   Gastrointestinal: Positive for abdominal pain, nausea and vomiting. Negative for abdominal distention.   Skin: Negative for rash.   Neurological: Negative for speech difficulty and numbness.     Objective:     Vital Signs (Most Recent):  Temp: 97.7 °F (36.5 °C) (09/04/18 0731)  Pulse: 63 (09/04/18 0731)  Resp: 18 (09/04/18 0332)  BP: 109/64 (09/04/18 0731)  SpO2: (!) 90 % (09/04/18 0731) Vital Signs (24h Range):  Temp:  [97.7 °F (36.5 °C)-99.7 °F (37.6 °C)] 97.7 °F (36.5 °C)  Pulse:   [58-75] 63  Resp:  [12-19] 18  SpO2:  [90 %-95 %] 90 %  BP: ()/(54-64) 109/64     Weight: 106 kg (233 lb 11 oz)  Body mass index is 40.11 kg/m².    Intake/Output Summary (Last 24 hours) at 9/4/2018 0953  Last data filed at 9/4/2018 0600  Gross per 24 hour   Intake 2020 ml   Output 2 ml   Net 2018 ml      Physical Exam   Constitutional: She is oriented to person, place, and time. She appears well-developed and well-nourished.   HENT:   Head: Normocephalic and atraumatic.   Mouth/Throat: Oropharynx is clear and moist.   Eyes: Conjunctivae and EOM are normal. Pupils are equal, round, and reactive to light.   Neck: Normal range of motion. Neck supple.   Cardiovascular: Normal rate, regular rhythm, normal heart sounds and intact distal pulses.   No murmur heard.  Pulmonary/Chest: Effort normal and breath sounds normal. She has no wheezes.   Abdominal: Soft. Bowel sounds are normal. There is tenderness.   Upper abd tenderness- mild, abdomen obese   Musculoskeletal: Normal range of motion.   Neurological: She is alert and oriented to person, place, and time.   Skin: Skin is warm and dry.   Psychiatric: She has a normal mood and affect. Her behavior is normal. Judgment normal.   Nursing note and vitals reviewed.      Significant Labs:   CBC:   Recent Labs   Lab  09/02/18 2125 09/03/18   0505  09/04/18   0421   WBC  10.20  12.50  12.60   HGB  14.1  13.0  12.2   HCT  43.9  39.3  37.5   PLT  368*  331  309     CMP:   Recent Labs   Lab  09/02/18 2125 09/03/18   0505  09/04/18   0421   NA  139  139  135*   K  4.0  5.1  4.2   CL  103  105  105   CO2  26  26  22*   GLU  104  121*  110   BUN  9  10  9   CREATININE  0.8  0.7  0.7   CALCIUM  9.9  8.7  8.5*   PROT  7.0  6.4  6.2   ALBUMIN  3.6  3.3*  3.1*   BILITOT  0.2  0.2  0.6   ALKPHOS  82  77  159*   AST  16  26  349*   ALT  27  38  529*   ANIONGAP  10  8  8   EGFRNONAA  >60  >60  >60       Significant Imaging: I have reviewed and interpreted all pertinent imaging  results/findings within the past 24 hours.    Assessment/Plan:      * Calculus of gallbladder with acute cholecystitis without obstruction    Requiring IV narcotics/antiemetics for symptomatic relief.  General surgery consulted from ED-- will see in AM.  No evidence of cholestatic process or cholecystitis-- will not initiate IV abx at this time. Keep NPO for possible surgical intervention; IV hydration.  Lap bethel today. Initiated on Zosyn  Elevated LFTs. Continue IV fluids and monitor        Morbidly obese    Body mass index is 40.11 kg/m².  General weight loss/lifestyle modification strategies discussed (elicit support from others; identify saboteurs; non-food rewards, etc).            Nicotine dependence with current use    Health hazards associated with cigarette smoking were reviewed with patient and cessation was encouraged. Nicotine replacement and counseling options were discussed.  Spent 3 minutes counseling on cessation, patient not ready to quit.  Agreeable to nicotine patch.          Mild episode of recurrent major depressive disorder    Chronic issue, controlled on current medication.  Continue prozac.             VTE Risk Mitigation (From admission, onward)        Ordered     enoxaparin injection 40 mg  Daily      09/03/18 1538     IP VTE HIGH RISK PATIENT  Once      09/02/18 2319     Place MITCH hose  Until discontinued      09/02/18 2319     Place sequential compression device  Until discontinued      09/02/18 2319        Discussed POC with patient.      Harmony Palumbo NP  Department of Hospital Medicine   [facility]

## 2018-09-04 NOTE — SUBJECTIVE & OBJECTIVE
Interval History: significantly elevated LFTs this am. Sitting up in chair. Post op pain controlled with meds. Tolerating diet.     Review of Systems   Constitutional: Positive for appetite change and fatigue. Negative for diaphoresis and fever.   Respiratory: Negative for cough and shortness of breath.    Cardiovascular: Negative for chest pain and leg swelling.   Gastrointestinal: Positive for abdominal pain, nausea and vomiting. Negative for abdominal distention.   Skin: Negative for rash.   Neurological: Negative for speech difficulty and numbness.     Objective:     Vital Signs (Most Recent):  Temp: 97.7 °F (36.5 °C) (09/04/18 0731)  Pulse: 63 (09/04/18 0731)  Resp: 18 (09/04/18 0332)  BP: 109/64 (09/04/18 0731)  SpO2: (!) 90 % (09/04/18 0731) Vital Signs (24h Range):  Temp:  [97.7 °F (36.5 °C)-99.7 °F (37.6 °C)] 97.7 °F (36.5 °C)  Pulse:  [58-75] 63  Resp:  [12-19] 18  SpO2:  [90 %-95 %] 90 %  BP: ()/(54-64) 109/64     Weight: 106 kg (233 lb 11 oz)  Body mass index is 40.11 kg/m².    Intake/Output Summary (Last 24 hours) at 9/4/2018 0953  Last data filed at 9/4/2018 0600  Gross per 24 hour   Intake 2020 ml   Output 2 ml   Net 2018 ml      Physical Exam   Constitutional: She is oriented to person, place, and time. She appears well-developed and well-nourished.   HENT:   Head: Normocephalic and atraumatic.   Mouth/Throat: Oropharynx is clear and moist.   Eyes: Conjunctivae and EOM are normal. Pupils are equal, round, and reactive to light.   Neck: Normal range of motion. Neck supple.   Cardiovascular: Normal rate, regular rhythm, normal heart sounds and intact distal pulses.   No murmur heard.  Pulmonary/Chest: Effort normal and breath sounds normal. She has no wheezes.   Abdominal: Soft. Bowel sounds are normal. There is tenderness.   Upper abd tenderness- mild, abdomen obese   Musculoskeletal: Normal range of motion.   Neurological: She is alert and oriented to person, place, and time.   Skin: Skin is  warm and dry.   Psychiatric: She has a normal mood and affect. Her behavior is normal. Judgment normal.   Nursing note and vitals reviewed.      Significant Labs:   CBC:   Recent Labs   Lab  09/02/18 2125 09/03/18   0505  09/04/18   0421   WBC  10.20  12.50  12.60   HGB  14.1  13.0  12.2   HCT  43.9  39.3  37.5   PLT  368*  331  309     CMP:   Recent Labs   Lab  09/02/18 2125 09/03/18 0505  09/04/18   0421   NA  139  139  135*   K  4.0  5.1  4.2   CL  103  105  105   CO2  26  26  22*   GLU  104  121*  110   BUN  9  10  9   CREATININE  0.8  0.7  0.7   CALCIUM  9.9  8.7  8.5*   PROT  7.0  6.4  6.2   ALBUMIN  3.6  3.3*  3.1*   BILITOT  0.2  0.2  0.6   ALKPHOS  82  77  159*   AST  16  26  349*   ALT  27  38  529*   ANIONGAP  10  8  8   EGFRNONAA  >60  >60  >60       Significant Imaging: I have reviewed and interpreted all pertinent imaging results/findings within the past 24 hours.

## 2018-09-04 NOTE — PLAN OF CARE
Problem: Patient Care Overview  Goal: Plan of Care Review  Outcome: Ongoing (interventions implemented as appropriate)  Alert and oriented. NS infusing 125ml/hr. PRN meds given for pain. Up with assistance. Free of fall or injury. Call light in reach. Will continue to monitor.

## 2018-09-04 NOTE — SUBJECTIVE & OBJECTIVE
Interval History: S/P lap bethel for gangrenous cholecystitis. Transaminases elevated but bili normal.    Medications:  Continuous Infusions:   sodium chloride 0.9% 125 mL/hr at 09/04/18 0555     Scheduled Meds:   enoxaparin  40 mg Subcutaneous Daily    FLUoxetine  40 mg Oral Daily    nicotine  1 patch Transdermal Daily    piperacillin-tazobactam (ZOSYN) IVPB  3.375 g Intravenous Q8H    senna-docusate 8.6-50 mg  1 tablet Oral BID     PRN Meds:dextrose 50%, dextrose 50%, glucagon (human recombinant), glucose, glucose, HYDROcodone-acetaminophen, HYDROmorphone, magnesium oxide, magnesium oxide, ondansetron, potassium chloride 10%, potassium chloride 10%, promethazine (PHENERGAN) IVPB, ramelteon, sodium chloride 0.9%     Review of patient's allergies indicates:  No Known Allergies  Objective:     Vital Signs (Most Recent):  Temp: 99.3 °F (37.4 °C) (09/04/18 1629)  Pulse: 64 (09/04/18 1629)  Resp: 20 (09/04/18 1629)  BP: (!) 119/57 (09/04/18 1629)  SpO2: 95 % (09/04/18 1629) Vital Signs (24h Range):  Temp:  [97.7 °F (36.5 °C)-99.3 °F (37.4 °C)] 99.3 °F (37.4 °C)  Pulse:  [58-73] 64  Resp:  [17-20] 20  SpO2:  [90 %-95 %] 95 %  BP: ()/(54-64) 119/57     Weight: 106 kg (233 lb 11 oz)  Body mass index is 40.11 kg/m².    Intake/Output - Last 3 Shifts       09/02 0700 - 09/03 0659 09/03 0700 - 09/04 0659 09/04 0700 - 09/05 0659    P.O. 0 120     I.V. (mL/kg) 841.7 (7.9) 1850 (17.5)     IV Piggyback  50     Total Intake(mL/kg) 841.7 (7.9) 2020 (19.1)     Urine (mL/kg/hr)  0 (0)     Blood  2     Total Output  2     Net +841.7 +2018            Urine Occurrence 1 x 2 x           Physical Exam   Constitutional: She is oriented to person, place, and time. No distress.   HENT:   Head: Normocephalic and atraumatic.   Eyes: No scleral icterus.   Cardiovascular: Normal rate and regular rhythm.   Pulmonary/Chest: Effort normal and breath sounds normal. No respiratory distress. She has no wheezes. She has no rales.   Abdominal:  Soft. Bowel sounds are normal. She exhibits no distension. There is tenderness (Minimal tenderness but much better.).   Neurological: She is alert and oriented to person, place, and time.   Psychiatric: She has a normal mood and affect. Her behavior is normal.       Significant Labs:  CBC:   Recent Labs   Lab  09/04/18 0421   WBC  12.60   RBC  4.16   HGB  12.2   HCT  37.5   PLT  309   MCV  90   MCH  29.3   MCHC  32.6     CMP:   Recent Labs   Lab  09/04/18 0421   GLU  110   CALCIUM  8.5*   ALBUMIN  3.1*   PROT  6.2   NA  135*   K  4.2   CO2  22*   CL  105   BUN  9   CREATININE  0.7   ALKPHOS  159*   ALT  529*   AST  349*   BILITOT  0.6

## 2018-09-04 NOTE — PLAN OF CARE
Problem: Patient Care Overview  Goal: Plan of Care Review  Outcome: Ongoing (interventions implemented as appropriate)  Fall and safety precautions maintained.  Patient alert and oriented, resting in bed.  Denies pain or SOB. VSS, Pt in NAD.  Plan of care reviewed with patient. Verbalizes understanding.  Call light in reach, bed in low position and wheels locked. Will continue to monitor.  Pt transferred to Inpatient.  Family bedside.

## 2018-09-04 NOTE — ASSESSMENT & PLAN NOTE
1. S/P lap cholecystectomy.  2. Elevated trasnaminases with normal bilirubin.  3. Recheck labs in AM. If stable, should be OK for DC.  4. Continue antibiotics. Home on oral antibiotics.

## 2018-09-04 NOTE — PLAN OF CARE
09/04/18 1356   PRE-TX-O2-ETCO2   O2 Device (Oxygen Therapy) nasal cannula   $ Is the patient on Low Flow Oxygen? Yes   Flow (L/min) 2   Oxygen Concentration (%) 28   SpO2 (!) 92 %   Pulse Oximetry Type Intermittent   $ Pulse Oximetry - Multiple Charge Pulse Oximetry - Multiple   Ready to Wean/Extubation Screen   FIO2<=50 (chart decimal) 0.28

## 2018-09-04 NOTE — ASSESSMENT & PLAN NOTE
Requiring IV narcotics/antiemetics for symptomatic relief.  General surgery consulted from ED-- will see in AM.  No evidence of cholestatic process or cholecystitis-- will not initiate IV abx at this time. Keep NPO for possible surgical intervention; IV hydration.  Lap bethel today. Initiated on Zosyn  Elevated LFTs. Continue IV fluids and monitor

## 2018-09-04 NOTE — HOSPITAL COURSE
The patient was monitored closely during admission. She was initiated on IV Zosyn, IVF, antiemetics, and pain control. General surgery was consulted and on 9/03/18, patient underwent a laparoscopic cholecystectomy on 9/3/2018. Patient tolerated procedure well. Her overall condition remained stable and improved and she was discharged to home.

## 2018-09-05 VITALS
SYSTOLIC BLOOD PRESSURE: 136 MMHG | RESPIRATION RATE: 18 BRPM | WEIGHT: 233.69 LBS | BODY MASS INDEX: 39.9 KG/M2 | DIASTOLIC BLOOD PRESSURE: 64 MMHG | TEMPERATURE: 99 F | OXYGEN SATURATION: 94 % | HEIGHT: 64 IN | HEART RATE: 76 BPM

## 2018-09-05 PROBLEM — E83.39 HYPOPHOSPHATEMIA: Status: ACTIVE | Noted: 2018-09-05

## 2018-09-05 LAB
ALBUMIN SERPL BCP-MCNC: 2.6 G/DL
ALP SERPL-CCNC: 163 U/L
ALT SERPL W/O P-5'-P-CCNC: 335 U/L
ANION GAP SERPL CALC-SCNC: 7 MMOL/L
AST SERPL-CCNC: 124 U/L
BASOPHILS # BLD AUTO: 0 K/UL
BASOPHILS NFR BLD: 0.4 %
BILIRUB SERPL-MCNC: 0.5 MG/DL
BUN SERPL-MCNC: 6 MG/DL
CALCIUM SERPL-MCNC: 8.1 MG/DL
CHLORIDE SERPL-SCNC: 105 MMOL/L
CO2 SERPL-SCNC: 27 MMOL/L
CREAT SERPL-MCNC: 0.7 MG/DL
DIFFERENTIAL METHOD: ABNORMAL
EOSINOPHIL # BLD AUTO: 0.1 K/UL
EOSINOPHIL NFR BLD: 1.2 %
ERYTHROCYTE [DISTWIDTH] IN BLOOD BY AUTOMATED COUNT: 14.8 %
EST. GFR  (AFRICAN AMERICAN): >60 ML/MIN/1.73 M^2
EST. GFR  (NON AFRICAN AMERICAN): >60 ML/MIN/1.73 M^2
GLUCOSE SERPL-MCNC: 88 MG/DL
HCT VFR BLD AUTO: 34 %
HGB BLD-MCNC: 11 G/DL
LYMPHOCYTES # BLD AUTO: 2.3 K/UL
LYMPHOCYTES NFR BLD: 26.1 %
MAGNESIUM SERPL-MCNC: 1.8 MG/DL
MCH RBC QN AUTO: 29.3 PG
MCHC RBC AUTO-ENTMCNC: 32.5 G/DL
MCV RBC AUTO: 90 FL
MONOCYTES # BLD AUTO: 0.6 K/UL
MONOCYTES NFR BLD: 6.6 %
NEUTROPHILS # BLD AUTO: 5.7 K/UL
NEUTROPHILS NFR BLD: 65.7 %
PHOSPHATE SERPL-MCNC: 1.9 MG/DL
PLATELET # BLD AUTO: 260 K/UL
PMV BLD AUTO: 8.4 FL
POTASSIUM SERPL-SCNC: 3.5 MMOL/L
PROT SERPL-MCNC: 5.6 G/DL
RBC # BLD AUTO: 3.77 M/UL
SODIUM SERPL-SCNC: 139 MMOL/L
WBC # BLD AUTO: 8.7 K/UL

## 2018-09-05 PROCEDURE — 36415 COLL VENOUS BLD VENIPUNCTURE: CPT

## 2018-09-05 PROCEDURE — 63600175 PHARM REV CODE 636 W HCPCS: Performed by: SURGERY

## 2018-09-05 PROCEDURE — 94799 UNLISTED PULMONARY SVC/PX: CPT

## 2018-09-05 PROCEDURE — 25000003 PHARM REV CODE 250: Performed by: NURSE PRACTITIONER

## 2018-09-05 PROCEDURE — 94761 N-INVAS EAR/PLS OXIMETRY MLT: CPT

## 2018-09-05 PROCEDURE — 80053 COMPREHEN METABOLIC PANEL: CPT

## 2018-09-05 PROCEDURE — 27000221 HC OXYGEN, UP TO 24 HOURS

## 2018-09-05 PROCEDURE — 83735 ASSAY OF MAGNESIUM: CPT

## 2018-09-05 PROCEDURE — 84100 ASSAY OF PHOSPHORUS: CPT

## 2018-09-05 PROCEDURE — 85025 COMPLETE CBC W/AUTO DIFF WBC: CPT

## 2018-09-05 PROCEDURE — 63600175 PHARM REV CODE 636 W HCPCS: Performed by: NURSE PRACTITIONER

## 2018-09-05 RX ORDER — IBUPROFEN 200 MG
1 TABLET ORAL DAILY
Refills: 0 | Status: ON HOLD | COMMUNITY
Start: 2018-09-06 | End: 2019-11-08

## 2018-09-05 RX ORDER — ACETAMINOPHEN 500 MG
1000 TABLET ORAL EVERY 6 HOURS PRN
Refills: 0 | COMMUNITY
Start: 2018-09-05 | End: 2019-04-17

## 2018-09-05 RX ORDER — AMOXICILLIN AND CLAVULANATE POTASSIUM 875; 125 MG/1; MG/1
1 TABLET, FILM COATED ORAL EVERY 12 HOURS
Qty: 14 TABLET | Refills: 0 | Status: SHIPPED | OUTPATIENT
Start: 2018-09-05 | End: 2018-09-12

## 2018-09-05 RX ORDER — KETOROLAC TROMETHAMINE 10 MG/1
10 TABLET, FILM COATED ORAL EVERY 12 HOURS PRN
Qty: 6 TABLET | Refills: 0 | Status: SHIPPED | OUTPATIENT
Start: 2018-09-05 | End: 2019-04-17

## 2018-09-05 RX ORDER — SODIUM,POTASSIUM PHOSPHATES 280-250MG
2 POWDER IN PACKET (EA) ORAL
Status: DISCONTINUED | OUTPATIENT
Start: 2018-09-05 | End: 2018-09-05 | Stop reason: HOSPADM

## 2018-09-05 RX ADMIN — ENOXAPARIN SODIUM 40 MG: 100 INJECTION SUBCUTANEOUS at 08:09

## 2018-09-05 RX ADMIN — HYDROCODONE BITARTRATE AND ACETAMINOPHEN 1 TABLET: 10; 325 TABLET ORAL at 08:09

## 2018-09-05 RX ADMIN — SODIUM CHLORIDE: 0.9 INJECTION, SOLUTION INTRAVENOUS at 05:09

## 2018-09-05 RX ADMIN — FLUOXETINE 40 MG: 20 CAPSULE ORAL at 08:09

## 2018-09-05 RX ADMIN — PIPERACILLIN AND TAZOBACTAM 3.38 G: 3; .375 INJECTION, POWDER, LYOPHILIZED, FOR SOLUTION INTRAVENOUS; PARENTERAL at 04:09

## 2018-09-05 RX ADMIN — PIPERACILLIN AND TAZOBACTAM 3.38 G: 3; .375 INJECTION, POWDER, LYOPHILIZED, FOR SOLUTION INTRAVENOUS; PARENTERAL at 08:09

## 2018-09-05 RX ADMIN — POTASSIUM & SODIUM PHOSPHATES POWDER PACK 280-160-250 MG 2 PACKET: 280-160-250 PACK at 11:09

## 2018-09-05 RX ADMIN — STANDARDIZED SENNA CONCENTRATE AND DOCUSATE SODIUM 1 TABLET: 8.6; 5 TABLET, FILM COATED ORAL at 08:09

## 2018-09-05 RX ADMIN — THERA TABS 1 TABLET: TAB at 11:09

## 2018-09-05 RX ADMIN — PIPERACILLIN AND TAZOBACTAM 3.38 G: 3; .375 INJECTION, POWDER, LYOPHILIZED, FOR SOLUTION INTRAVENOUS; PARENTERAL at 12:09

## 2018-09-05 NOTE — PLAN OF CARE
09/05/18 1150   PRE-TX-O2-ETCO2   O2 Device (Oxygen Therapy) room air   SpO2 (!) 92 %   Pulse Oximetry Type Intermittent

## 2018-09-05 NOTE — DISCHARGE SUMMARY
[facility] Medicine  Discharge Summary      Patient Name: Gi Abdullahi  MRN: [id number]  Admission Date: 9/2/2018  Hospital Length of Stay: 1 days  Discharge Date and Time:  09/05/2018 5:26 PM  Attending Physician: Jero Lloyd MD   Discharging Provider: TERE Xiao  Primary Care Provider: Cain Cristobal MD    HPI:   Gi Abdullahi is a 47 yo female with PMHx significant for anxiety/depression.  She was admitted to the service of hospital medicine with cholelithiasis.  She presented to the ED with c/o sudden onset RUQ pain at 2030 while laying down.  She reports eating pork and potatoes for dinner ~ 3 hrs prior.  Pain radiates up chest and to back rated 10/10 at onset and presently.  Pain worsens with shortness of breath/deep breath.  No alleviating factors.  Associated symptoms include non-bloody emesis without nausea.  She denies history of same. She denies any fever, chills, diarrhea, constipation, or other complaints. The patient was evaluated in ER and found to have acute cholecystitis and placed in hospital for further evaluation and treatment.        Procedure(s) (LRB):  CHOLECYSTECTOMY, LAPAROSCOPIC (N/A)      Hospital Course:   The patient was monitored closely during admission. She was initiated on IV Zosyn, IVF, antiemetics, and pain control. General surgery was consulted and on 9/03/18, patient underwent a laparoscopic cholecystectomy on 9/3/2018. Patient tolerated procedure well. Her overall condition remained stable and improved and she was discharged to home.      Consults:   Consults (From admission, onward)        Status Ordering Provider     Inpatient consult to General Surgery  Once     Provider:  MD Duong Charles JESSICA M.        Service: Hospital Medicine    Final Active Diagnoses:    Diagnosis Date Noted POA    PRINCIPAL PROBLEM:  Calculus of gallbladder with acute cholecystitis without obstruction [K80.00] 09/02/2018 Yes     Hypophosphatemia [E83.39] 09/05/2018 Yes    Morbidly obese [E66.01] 09/03/2018 Yes    Mild episode of recurrent major depressive disorder [F33.0] 09/02/2018 Yes    Nicotine dependence with current use [F17.200] 09/02/2018 Yes      Problems Resolved During this Admission:       Discharged Condition: stable    Disposition: Home or Self Care    Follow Up:  Follow-up Information     Wilmer Degroot MD In 1 week.    Specialties:  General Surgery, Surgery  Why:  Follow up in 7-10 days for post op wound check and follow up   Contact information:  [address]  [address]  [place] [address]  [phone]             Cain Cristobal MD In 2 weeks.    Specialties:  Family Medicine, Internal Medicine  Contact information:  [address]  [place] [address]  [phone]                 Patient Instructions:      Diet Cardiac   Order Comments: Low fat, low cholesterol     Notify your health care provider if you experience any of the following:  temperature >100.4     Notify your health care provider if you experience any of the following:  severe uncontrolled pain     Notify your health care provider if you experience any of the following:  redness, tenderness, or signs of infection (pain, swelling, redness, odor or green/yellow discharge around incision site)     Notify your health care provider if you experience any of the following:   Order Comments: Any decline in condition     Activity as tolerated   Order Comments: No heavy lifting, no strenuous activity  No tub bath, shower ok     Significant Diagnostic Studies:     Recent Labs   Lab  09/02/18   2125   TROPONINI  <0.006     BNP < 10    X-Ray Chest 1 View-  Mildly prominent markings right infrahilar suggesting mild atelectasis.  Cannot exclude infiltrate with correlation recommended clinically and if indicated clinically follow-up study to include two views of the chest might aid in evaluation    Mildly prominent markings right infrahilar suggesting mild atelectasis.   Cannot exclude infiltrate with correlation recommended clinically and if indicated clinically follow-up study to include two views of the chest might aid in evaluation    US Abdomen Limited - Cholelithiasis.  No ultrasound findings of acute cholecystitis however positive sonographic Arriaza sign reported and if indicated clinically follow-up study such as nuclear hepatobiliary scan could be obtained    Labs:   CMP   Recent Labs   Lab  09/04/18   0421 09/05/18   0533   NA  135*  139   K  4.2  3.5   CL  105  105   CO2  22*  27   GLU  110  88   BUN  9  6   CREATININE  0.7  0.7   CALCIUM  8.5*  8.1*   PROT  6.2  5.6*   ALBUMIN  3.1*  2.6*   BILITOT  0.6  0.5   ALKPHOS  159*  163*   AST  349*  124*   ALT  529*  335*   ANIONGAP  8  7*   ESTGFRAFRICA  >60  >60   EGFRNONAA  >60  >60    and CBC   Recent Labs   Lab  09/04/18 0421 09/05/18   0533   WBC  12.60  8.70   HGB  12.2  11.0*   HCT  37.5  34.0*   PLT  309  260       Pending Diagnostic Studies:     None         Medications:  Reconciled Home Medications:      Medication List      START taking these medications    acetaminophen 500 MG tablet  Commonly known as:  TYLENOL  Take 2 tablets (1,000 mg total) by mouth every 6 (six) hours as needed for Pain (mild to moderate pain- ok to alternate with toradol).     amoxicillin-clavulanate 875-125mg 875-125 mg per tablet  Commonly known as:  AUGMENTIN  Take 1 tablet by mouth every 12 (twelve) hours. for 7 days     ketorolac 10 mg tablet  Commonly known as:  TORADOL  Take 1 tablet (10 mg total) by mouth every 12 (twelve) hours as needed for Pain (Moderate to Severe pain).     multivitamin tablet  Commonly known as:  THERAGRAN  Take 1 tablet by mouth once daily.     nicotine 14 mg/24 hr  Commonly known as:  NICODERM CQ  Place 1 patch onto the skin once daily.        CONTINUE taking these medications    FLUoxetine 40 MG capsule  Commonly known as:  PROZAC  Take 1 capsule (40 mg total) by mouth once daily.        STOP taking these  medications    clotrimazole-betamethasone 1-0.05% cream  Commonly known as:  LOTRISONE          Indwelling Lines/Drains at time of discharge:   Lines/Drains/Airways          None        Time spent on the discharge of patient: 48 minutes  Patient was seen and examined on the date of discharge and determined to be suitable for discharge.       TERE Xiao  Department of Hospital Medicine  [facility]

## 2018-09-05 NOTE — SUBJECTIVE & OBJECTIVE
Interval History: S/P lap bethel. Had temp today.    Medications:  Continuous Infusions:   sodium chloride 0.9% 125 mL/hr at 09/05/18 0541     Scheduled Meds:   enoxaparin  40 mg Subcutaneous Daily    FLUoxetine  40 mg Oral Daily    multivitamin  1 tablet Oral Daily    nicotine  1 patch Transdermal Daily    piperacillin-tazobactam (ZOSYN) IVPB  3.375 g Intravenous Q8H    potassium, sodium phosphates  2 packet Oral QID (AC & HS)    senna-docusate 8.6-50 mg  1 tablet Oral BID     PRN Meds:dextrose 50%, dextrose 50%, glucagon (human recombinant), glucose, glucose, HYDROcodone-acetaminophen, HYDROmorphone, magnesium oxide, magnesium oxide, ondansetron, potassium chloride 10%, potassium chloride 10%, promethazine (PHENERGAN) IVPB, ramelteon, sodium chloride 0.9%     Review of patient's allergies indicates:  No Known Allergies  Objective:     Vital Signs (Most Recent):  Temp: 100.2 °F (37.9 °C) (09/05/18 1453)  Pulse: 75 (09/05/18 1149)  Resp: 18 (09/05/18 1149)  BP: 131/67 (09/05/18 1149)  SpO2: (!) 90 % (09/05/18 1149) Vital Signs (24h Range):  Temp:  [98 °F (36.7 °C)-100.4 °F (38 °C)] 100.2 °F (37.9 °C)  Pulse:  [61-75] 75  Resp:  [17-20] 18  SpO2:  [90 %-96 %] 90 %  BP: (107-131)/(57-67) 131/67     Weight: 106 kg (233 lb 11 oz)  Body mass index is 40.11 kg/m².    Intake/Output - Last 3 Shifts       09/03 0700 - 09/04 0659 09/04 0700 - 09/05 0659 09/05 0700 - 09/06 0659    P.O. 120      I.V. (mL/kg) 1850 (17.5)      IV Piggyback 50      Total Intake(mL/kg) 2020 (19.1)      Urine (mL/kg/hr) 0 (0)      Blood 2      Total Output 2      Net +2018             Urine Occurrence 2 x            Physical Exam   Constitutional: She is oriented to person, place, and time. No distress.   HENT:   Head: Normocephalic and atraumatic.   Eyes: No scleral icterus.   Cardiovascular: Normal rate and regular rhythm.   Pulmonary/Chest: Effort normal and breath sounds normal. No respiratory distress. She has no wheezes. She has no  rales.   Abdominal: Soft. Bowel sounds are normal. She exhibits no distension. There is tenderness (Expected post op tenderness).   Neurological: She is alert and oriented to person, place, and time.   Psychiatric: She has a normal mood and affect. Her behavior is normal.       Significant Labs:  CBC:   Recent Labs   Lab  09/05/18   0533   WBC  8.70   RBC  3.77*   HGB  11.0*   HCT  34.0*   PLT  260   MCV  90   MCH  29.3   MCHC  32.5     CMP:   Recent Labs   Lab  09/05/18   0533   GLU  88   CALCIUM  8.1*   ALBUMIN  2.6*   PROT  5.6*   NA  139   K  3.5   CO2  27   CL  105   BUN  6   CREATININE  0.7   ALKPHOS  163*   ALT  335*   AST  124*   BILITOT  0.5

## 2018-09-05 NOTE — PROGRESS NOTES
[facility]  Progress Note    Subjective:     History of Present Illness:  49 yo female with sudden onset of RUQ pain last night. No similar history in the past. She did not know she had cholelithiasis. US shows cholelithiasis without evidence of cholecystitis. She had severe pain and N/V after eating a pork chop last night. Labs are WNL.     Post-Op Info:  Procedure(s) (LRB):  CHOLECYSTECTOMY, LAPAROSCOPIC (N/A)   2 Days Post-Op     Interval History: S/P lap bethel. Had temp today.    Medications:  Continuous Infusions:   sodium chloride 0.9% 125 mL/hr at 09/05/18 0541     Scheduled Meds:   enoxaparin  40 mg Subcutaneous Daily    FLUoxetine  40 mg Oral Daily    multivitamin  1 tablet Oral Daily    nicotine  1 patch Transdermal Daily    piperacillin-tazobactam (ZOSYN) IVPB  3.375 g Intravenous Q8H    potassium, sodium phosphates  2 packet Oral QID (AC & HS)    senna-docusate 8.6-50 mg  1 tablet Oral BID     PRN Meds:dextrose 50%, dextrose 50%, glucagon (human recombinant), glucose, glucose, HYDROcodone-acetaminophen, HYDROmorphone, magnesium oxide, magnesium oxide, ondansetron, potassium chloride 10%, potassium chloride 10%, promethazine (PHENERGAN) IVPB, ramelteon, sodium chloride 0.9%     Review of patient's allergies indicates:  No Known Allergies  Objective:     Vital Signs (Most Recent):  Temp: 100.2 °F (37.9 °C) (09/05/18 1453)  Pulse: 75 (09/05/18 1149)  Resp: 18 (09/05/18 1149)  BP: 131/67 (09/05/18 1149)  SpO2: (!) 90 % (09/05/18 1149) Vital Signs (24h Range):  Temp:  [98 °F (36.7 °C)-100.4 °F (38 °C)] 100.2 °F (37.9 °C)  Pulse:  [61-75] 75  Resp:  [17-20] 18  SpO2:  [90 %-96 %] 90 %  BP: (107-131)/(57-67) 131/67     Weight: 106 kg (233 lb 11 oz)  Body mass index is 40.11 kg/m².    Intake/Output - Last 3 Shifts       09/03 0700 - 09/04 0659 09/04 0700 - 09/05 0659 09/05 0700 - 09/06 0659    P.O. 120      I.V. (mL/kg) 1850 (17.5)      IV Piggyback 50      Total  Intake(mL/kg) 2020 (19.1)      Urine (mL/kg/hr) 0 (0)      Blood 2      Total Output 2      Net +2018             Urine Occurrence 2 x            Physical Exam   Constitutional: She is oriented to person, place, and time. No distress.   HENT:   Head: Normocephalic and atraumatic.   Eyes: No scleral icterus.   Cardiovascular: Normal rate and regular rhythm.   Pulmonary/Chest: Effort normal and breath sounds normal. No respiratory distress. She has no wheezes. She has no rales.   Abdominal: Soft. Bowel sounds are normal. She exhibits no distension. There is tenderness (Expected post op tenderness).   Neurological: She is alert and oriented to person, place, and time.   Psychiatric: She has a normal mood and affect. Her behavior is normal.       Significant Labs:  CBC:   Recent Labs   Lab  09/05/18   0533   WBC  8.70   RBC  3.77*   HGB  11.0*   HCT  34.0*   PLT  260   MCV  90   MCH  29.3   MCHC  32.5     CMP:   Recent Labs   Lab  09/05/18   0533   GLU  88   CALCIUM  8.1*   ALBUMIN  2.6*   PROT  5.6*   NA  139   K  3.5   CO2  27   CL  105   BUN  6   CREATININE  0.7   ALKPHOS  163*   ALT  335*   AST  124*   BILITOT  0.5         Assessment/Plan:     * Calculus of gallbladder with acute cholecystitis without obstruction    1. S/P lap cholecystectomy.  2. Elevated trasnaminases improving.  3. Should be OK for DC if fever resolves.  4. Continue antibiotics. Home on oral antibiotics.            Wilmer Degroot MD  General Surgery  [facility]

## 2018-09-05 NOTE — NURSING
Notified Marian Dolan NP of Temp 100.2. Marian ordered to hold discharging pt. Also ordered to recheck pt temp around 5:30 and to notify her of results.     1723- Temp 98.6. Notified TRISTAN Fonseca, and ordered to discharge pt.   Discharge instructions provided and explained to pt and spouse.Printed prescriptions also provided.  IV removed, catheter intact, tolerated well. No telemetry. VSS. No complaints. Pt transported off unit via wheelchair with  at side.

## 2018-09-05 NOTE — PROGRESS NOTES
Temperature 100.4. Hold discharge for now. Patient reports has been feeling well and tolerating po intake well. Will add incentive spirometer. No tylenol for now and monitor temperature to see if continues to rise. Discussed with Nurse.

## 2018-09-05 NOTE — SUBJECTIVE & OBJECTIVE
Interval History: Patient reports feeling much better. Po intake good. Low grade temperature 100.4 noted. Incentive spirometer ordered. Patient discussed with Dr. Church and will monitor throughout the day. Plan discharge later this evening if continues to feel well and if after dinner her temperature is less than 100.    Review of Systems   Constitutional: Negative for appetite change, chills, diaphoresis, fatigue and fever.   HENT: Negative for ear pain, facial swelling and hearing loss.    Eyes: Negative for photophobia and redness.   Respiratory: Negative for cough and shortness of breath.    Cardiovascular: Negative for chest pain, palpitations and leg swelling.   Gastrointestinal: Positive for abdominal pain. Negative for abdominal distention, blood in stool, constipation, diarrhea, nausea and vomiting.        Mild abdominal discomfort   Endocrine: Negative for polydipsia and polyphagia.   Genitourinary: Negative for difficulty urinating, dyspareunia, dysuria, flank pain and hematuria.   Musculoskeletal: Negative for arthralgias, gait problem, neck pain and neck stiffness.   Skin: Negative for wound.        Abdominal puncture sites   Allergic/Immunologic: Negative for food allergies.   Neurological: Negative for dizziness, seizures, syncope, facial asymmetry, speech difficulty and weakness.   Hematological: Does not bruise/bleed easily.   Psychiatric/Behavioral: Negative for agitation, behavioral problems, confusion, hallucinations and suicidal ideas. The patient is not nervous/anxious.      Objective:     Vital Signs (Most Recent):  Temp: (!) 101.3 °F (38.5 °C) (09/05/18 1536)  Pulse: 76 (09/05/18 1536)  Resp: 18 (09/05/18 1534)  BP: 136/64 (09/05/18 1534)  SpO2: (!) 90 % (09/05/18 1536) Vital Signs (24h Range):  Temp:  [98 °F (36.7 °C)-101.3 °F (38.5 °C)] 101.3 °F (38.5 °C)  Pulse:  [61-76] 76  Resp:  [17-20] 18  SpO2:  [90 %-96 %] 90 %  BP: (107-136)/(57-67) 136/64     Weight: 106 kg (233 lb 11 oz)  Body  mass index is 40.11 kg/m².    Intake/Output Summary (Last 24 hours) at 9/5/2018 1558  Last data filed at 9/5/2018 1200  Gross per 24 hour   Intake 440 ml   Output --   Net 440 ml      Physical Exam   Constitutional: She is oriented to person, place, and time. She appears well-developed and well-nourished. No distress.   HENT:   Head: Normocephalic and atraumatic.   Eyes: Conjunctivae and EOM are normal. Pupils are equal, round, and reactive to light. Right eye exhibits no discharge. Left eye exhibits no discharge.   Neck: Normal range of motion. Neck supple. No JVD present.   Cardiovascular: Normal rate, regular rhythm, normal heart sounds and intact distal pulses.   No murmur heard.  Pulmonary/Chest: Effort normal and breath sounds normal. No respiratory distress. She has no wheezes.   Abdominal: Soft. Bowel sounds are normal. She exhibits no distension. There is tenderness. There is no guarding.   Upper abd tenderness- mild, abdomen obese   Genitourinary:   Genitourinary Comments: Not examined   Musculoskeletal: Normal range of motion. She exhibits no edema.   Neurological: She is alert and oriented to person, place, and time. No cranial nerve deficit.   Skin: Skin is warm and dry. Capillary refill takes less than 2 seconds. She is not diaphoretic.   Abdominal dressings intact   Psychiatric: She has a normal mood and affect. Her behavior is normal. Judgment and thought content normal.   Nursing note and vitals reviewed.     Labs: Reviewed

## 2018-09-05 NOTE — PROGRESS NOTES
New IS order started. NC placed on patient     09/05/18 7475   Patient Assessment/Suction   Level of Consciousness (AVPU) alert   PRE-TX-O2-ETCO2   O2 Device (Oxygen Therapy) nasal cannula   $ Is the patient on Low Flow Oxygen? Yes   Flow (L/min) 2   Oxygen Concentration (%) 28   SpO2 (!) 94 %   Pulse Oximetry Type Intermittent   $ Pulse Oximetry - Multiple Charge Pulse Oximetry - Multiple   Incentive Spirometer   $ Incentive Spirometer Charges done with encouragement   Administration (Incentive Spirometer) postop instruction   Number of Repetitions (Incentive Spirometer) 12   Level (mL) (Incentive Spirometer) 500   Patient Tolerance good   Ready to Wean/Extubation Screen   FIO2<=50 (chart decimal) 0.28

## 2018-09-05 NOTE — PROGRESS NOTES
[facility] Medicine  Progress Note    Patient Name: Gi Abdullahi  MRN: [id number]  Patient Class: IP- Inpatient   Admission Date: 9/2/2018  Length of Stay: 1 days  Attending Physician: Jero Lloyd MD  Primary Care Provider: Cain Cristobal MD      Subjective:     Principal Problem:Calculus of gallbladder with acute cholecystitis without obstruction    HPI:  Gi Abdullahi is a 49 yo female with PMHx significant for anxiety/depression.  She was admitted to the service of hospital medicine with cholelithiasis.  She presented to the ED with c/o sudden onset RUQ pain at 2030 while laying down.  She reports eating pork and potatoes for dinner ~ 3 hrs prior.  Pain radiates up chest and to back rated 10/10 at onset and presently.  Pain worsens with shortness of breath/deep breath.  No alleviating factors.  Associated symptoms include non-bloody emesis without nausea.  She denies history of same. She denies any fever, chills, diarrhea, constipation, or other complaints. The patient was evaluated in ER and found to have acute cholecystitis and placed in hospital for further evaluation and treatment.        Hospital Course:  The patient was monitored closely during admission. She was initiated on IV Zosyn, IVF, antiemetics, and pain control. General surgery was consulted and on 9/03/18, patient underwent a laparoscopic cholecystectomy on 9/3/2018. Patient tolerated procedure well.     Interval History: Patient reports feeling much better. Po intake good. Low grade temperature 100.4 noted. Incentive spirometer ordered. Patient discussed with Dr. Church and will monitor throughout the day. Plan discharge later this evening if continues to feel well and if after dinner her temperature is less than 100.    Review of Systems   Constitutional: Negative for appetite change, chills, diaphoresis, fatigue and fever.   HENT: Negative for ear pain, facial swelling and hearing loss.    Eyes: Negative for  photophobia and redness.   Respiratory: Negative for cough and shortness of breath.    Cardiovascular: Negative for chest pain, palpitations and leg swelling.   Gastrointestinal: Positive for abdominal pain. Negative for abdominal distention, blood in stool, constipation, diarrhea, nausea and vomiting.        Mild abdominal discomfort   Endocrine: Negative for polydipsia and polyphagia.   Genitourinary: Negative for difficulty urinating, dyspareunia, dysuria, flank pain and hematuria.   Musculoskeletal: Negative for arthralgias, gait problem, neck pain and neck stiffness.   Skin: Negative for wound.        Abdominal puncture sites   Allergic/Immunologic: Negative for food allergies.   Neurological: Negative for dizziness, seizures, syncope, facial asymmetry, speech difficulty and weakness.   Hematological: Does not bruise/bleed easily.   Psychiatric/Behavioral: Negative for agitation, behavioral problems, confusion, hallucinations and suicidal ideas. The patient is not nervous/anxious.      Objective:     Vital Signs (Most Recent):  Temp: (!) 101.3 °F (38.5 °C) (09/05/18 1536)  Pulse: 76 (09/05/18 1536)  Resp: 18 (09/05/18 1534)  BP: 136/64 (09/05/18 1534)  SpO2: (!) 90 % (09/05/18 1536) Vital Signs (24h Range):  Temp:  [98 °F (36.7 °C)-101.3 °F (38.5 °C)] 101.3 °F (38.5 °C)  Pulse:  [61-76] 76  Resp:  [17-20] 18  SpO2:  [90 %-96 %] 90 %  BP: (107-136)/(57-67) 136/64     Weight: 106 kg (233 lb 11 oz)  Body mass index is 40.11 kg/m².    Intake/Output Summary (Last 24 hours) at 9/5/2018 1558  Last data filed at 9/5/2018 1200  Gross per 24 hour   Intake 440 ml   Output --   Net 440 ml      Physical Exam   Constitutional: She is oriented to person, place, and time. She appears well-developed and well-nourished. No distress.   HENT:   Head: Normocephalic and atraumatic.   Eyes: Conjunctivae and EOM are normal. Pupils are equal, round, and reactive to light. Right eye exhibits no discharge. Left eye exhibits no discharge.    Neck: Normal range of motion. Neck supple. No JVD present.   Cardiovascular: Normal rate, regular rhythm, normal heart sounds and intact distal pulses.   No murmur heard.  Pulmonary/Chest: Effort normal and breath sounds normal. No respiratory distress. She has no wheezes.   Abdominal: Soft. Bowel sounds are normal. She exhibits no distension. There is tenderness. There is no guarding.   Upper abd tenderness- mild, abdomen obese   Genitourinary:   Genitourinary Comments: Not examined   Musculoskeletal: Normal range of motion. She exhibits no edema.   Neurological: She is alert and oriented to person, place, and time. No cranial nerve deficit.   Skin: Skin is warm and dry. Capillary refill takes less than 2 seconds. She is not diaphoretic.   Abdominal dressings intact   Psychiatric: She has a normal mood and affect. Her behavior is normal. Judgment and thought content normal.   Nursing note and vitals reviewed.     Labs: Reviewed    Assessment/Plan:      * Calculus of gallbladder with acute cholecystitis without obstruction    Orders as per Surgeon-  Continue Iv  Zosyn  Prn pain medication        Hypophosphatemia    Monitor  Supplement as needed          Morbidly obese    Body mass index is 40.11 kg/m².  General weight loss/lifestyle modification strategies discussed (elicit support from others; identify saboteurs; non-food rewards, etc).            Nicotine dependence with current use    Health hazards associated with cigarette smoking were reviewed with patient and cessation was encouraged. Nicotine replacement and counseling options were discussed.  Spent 3 minutes counseling on cessation, patient not ready to quit.  Agreeable to nicotine patch.  Smoking cessation education          Mild episode of recurrent major depressive disorder    Continue prozac.             VTE Risk Mitigation (From admission, onward)        Ordered     enoxaparin injection 40 mg  Daily      09/03/18 1538     IP VTE HIGH RISK PATIENT   Once      09/02/18 2319     Place MITCH hose  Until discontinued      09/02/18 2319     Place sequential compression device  Until discontinued      09/02/18 2319          TERE Xiao  Department of Hospital Medicine   [facility]    Time spent seeing patient( greater than 1/2 spent in direct contact) :  38 minutes

## 2018-09-05 NOTE — PLAN OF CARE
09/04/18 2024   Patient Assessment/Suction   Level of Consciousness (AVPU) alert   PRE-TX-O2-ETCO2   O2 Device (Oxygen Therapy) nasal cannula   Flow (L/min) 2   Oxygen Concentration (%) 28   SpO2 96 %   Pulse Oximetry Type Intermittent   Ready to Wean/Extubation Screen   FIO2<=50 (chart decimal) 0.28

## 2018-09-05 NOTE — ASSESSMENT & PLAN NOTE
1. S/P lap cholecystectomy.  2. Elevated trasnaminases improving.  3. Should be OK for DC if fever resolves.  4. Continue antibiotics. Home on oral antibiotics.

## 2018-09-05 NOTE — ASSESSMENT & PLAN NOTE
Health hazards associated with cigarette smoking were reviewed with patient and cessation was encouraged. Nicotine replacement and counseling options were discussed.  Spent 3 minutes counseling on cessation, patient not ready to quit.  Agreeable to nicotine patch.  Smoking cessation education

## 2018-09-05 NOTE — PLAN OF CARE
Problem: Patient Care Overview  Goal: Plan of Care Review  Outcome: Ongoing (interventions implemented as appropriate)  Pt alert and oriented x 4, positioned supine with head of bed elevated. Plan of care reviewed with pt. Pt acknowledged and understood. NAD. Normal Saline going at 125/hr. Vitals stable, Cardiac monitoring reading NS rhythm. Call light in reach, will continue to monitor.

## 2018-09-05 NOTE — DISCHARGE INSTRUCTIONS
Thank you for choosing [facility] for your medical care. The primary doctor who is taking care of you at the time of your discharge is Jero Lloyd MD.     You were admitted to the hospital with Calculus of gallbladder with acute cholecystitis without obstruction.     Please note your discharge instructions, including diet/activity restrictions, follow-up appointments, and medication changes.  If you have any questions about your medical issues, prescriptions, or any other questions, please feel free to contact the [facility] Medicine Dept at [phone] and we will help.    Please direct all long term medication refills and follow up to your primary care provider, Cain Cristobal MD. Thank you again for letting us take care of your health care needs.    Please note the following discharge instructions per your discharging physician-  Marian Dolan NP

## 2018-09-06 ENCOUNTER — TELEPHONE (OUTPATIENT)
Dept: MEDSURG UNIT | Facility: HOSPITAL | Age: 48
End: 2018-09-06

## 2018-09-07 ENCOUNTER — TELEPHONE (OUTPATIENT)
Dept: FAMILY MEDICINE | Facility: CLINIC | Age: 48
End: 2018-09-07

## 2018-09-07 NOTE — PHYSICIAN QUERY
PT Name: Gi Abdullahi  MR #: [id number]     Physician Query Form - Documentation Clarification      CDS/: Padmini Goldman               Contact information:[email or web address]    This form is a permanent document in the medical record.     Query Date: September 7, 2018    By submitting this query, we are merely seeking further clarification of documentation. Please utilize your independent clinical judgment when addressing the question(s) below.    The Medical record reflects the following:    Supporting Clinical Findings Location in Medical Record     Calculus of gallbladder with biliary obstruction but without cholecystitis      US shows cholelithiasis without evidence of cholecystitis    S/P lap bethel for gangrenous cholecystitis    Calculus of gallbladder with acute cholecystitis without obstruction   H&P, GS CN            GS PN  9/4        DS 9/5                                                                                      Doctor, Due to conflicting documentation, Please specify the appropriate diagnosis for this admission.    Provider Use Only      [ x  Calculus of gallbladder with acute cholecystitis    [   ]  Calculus of gallbladder without acute cholecystitis    [   ]  Other explanations with details___________________________________                                                                                                               [  ] Clinically undetermined

## 2018-09-10 ENCOUNTER — TELEPHONE (OUTPATIENT)
Dept: SURGERY | Facility: CLINIC | Age: 48
End: 2018-09-10

## 2018-09-10 NOTE — TELEPHONE ENCOUNTER
----- Message from Karen Buckley sent at 9/10/2018 11:09 AM CDT -----  Contact: Patient  Patient needs to be seen this week for her surgery post op appointment, she had her galbladder removed on 9/3/18, my first availability is not until 9/26. Please call to schedule, thank you! [phone]

## 2018-09-12 ENCOUNTER — OFFICE VISIT (OUTPATIENT)
Dept: SURGERY | Facility: CLINIC | Age: 48
End: 2018-09-12
Payer: COMMERCIAL

## 2018-09-12 ENCOUNTER — LAB VISIT (OUTPATIENT)
Dept: LAB | Facility: HOSPITAL | Age: 48
End: 2018-09-12
Attending: SURGERY
Payer: COMMERCIAL

## 2018-09-12 VITALS — BODY MASS INDEX: 38.98 KG/M2 | WEIGHT: 227.06 LBS

## 2018-09-12 DIAGNOSIS — Z98.890 POST-OPERATIVE STATE: ICD-10-CM

## 2018-09-12 DIAGNOSIS — Z98.890 POST-OPERATIVE STATE: Primary | ICD-10-CM

## 2018-09-12 LAB
BASOPHILS # BLD AUTO: 0 K/UL
BASOPHILS NFR BLD: 0.6 %
DIFFERENTIAL METHOD: ABNORMAL
EOSINOPHIL # BLD AUTO: 0.2 K/UL
EOSINOPHIL NFR BLD: 3.1 %
ERYTHROCYTE [DISTWIDTH] IN BLOOD BY AUTOMATED COUNT: 14.6 %
HCT VFR BLD AUTO: 39.7 %
HGB BLD-MCNC: 12.9 G/DL
LYMPHOCYTES # BLD AUTO: 2.5 K/UL
LYMPHOCYTES NFR BLD: 32.1 %
MCH RBC QN AUTO: 28.8 PG
MCHC RBC AUTO-ENTMCNC: 32.4 G/DL
MCV RBC AUTO: 89 FL
MONOCYTES # BLD AUTO: 0.6 K/UL
MONOCYTES NFR BLD: 7.5 %
NEUTROPHILS # BLD AUTO: 4.4 K/UL
NEUTROPHILS NFR BLD: 56.7 %
PLATELET # BLD AUTO: 506 K/UL
PMV BLD AUTO: 7.9 FL
RBC # BLD AUTO: 4.47 M/UL
WBC # BLD AUTO: 7.7 K/UL

## 2018-09-12 PROCEDURE — 36415 COLL VENOUS BLD VENIPUNCTURE: CPT

## 2018-09-12 PROCEDURE — 85025 COMPLETE CBC W/AUTO DIFF WBC: CPT

## 2018-09-12 PROCEDURE — 99024 POSTOP FOLLOW-UP VISIT: CPT | Mod: S$GLB,,, | Performed by: SURGERY

## 2018-09-12 PROCEDURE — 99999 PR PBB SHADOW E&M-EST. PATIENT-LVL II: CPT | Mod: PBBFAC,,, | Performed by: SURGERY

## 2018-09-12 NOTE — PROGRESS NOTES
POST-OP NOTE    PROCEDURE:  Laparoscopic cholecystectomy    COMPLAINTS: None.    EXAM: Incision well approximated. No drainage. No infection.      IMPRESSION: FINAL PATHOLOGIC DIAGNOSIS  GALLBLADDER, CHOLECYSTECTOMY:  -Acute and chronic cholecystitis.  -Cholelithiasis    PLAN: FU as needed.

## 2018-09-12 NOTE — PHYSICIAN QUERY
PT Name: Gi Abdullahi  MR #: [id number]    Physician Query Form - Pathology Findings Clarification     CDS/: Pdamini Goldman               Contact information:[email or web address]  This form is a permanent document in the medical record.     Query Date: September 12, 2018      By submitting this query, we are merely seeking further clarification of documentation.  Please utilize your independent clinical judgment when addressing the question(s) below.      The medical record contains the following:     Findings Supporting Clinical Information Location in Medical Record   Acute and chronic cholecystitis       She presented to the ED with c/o sudden onset RUQ pain at 2030 while laying down.  She reports eating pork and potatoes for dinner ~ 3 hrs prior.  Pain radiates up chest and to back rated 10/10 at onset and presently.  Pain worsens with shortness of breath/deep breath.  No alleviating factors    General surgery was consulted and on 9/03/18, patient underwent a laparoscopic cholecystectomy on 9/3/2018     Path report 9/3      DS 9/5     Please document the clinical significance of the Pathologists findings of __Acute and chronic cholecystitis____.          [ x ] I agree with the Pathology Findings        [  ] I do not agree with the Pathology Findings        [  ] Clinically Insignificant        [  ] Clinically Undetermined        [  ] Other/Clarification of Findings: ______________________________________________    Please document in your progress notes daily for the duration of treatment until resolved and include in your discharge summary.

## 2018-09-21 ENCOUNTER — LAB VISIT (OUTPATIENT)
Dept: LAB | Facility: HOSPITAL | Age: 48
End: 2018-09-21
Attending: SURGERY
Payer: COMMERCIAL

## 2018-09-21 DIAGNOSIS — Z98.890 POST-OPERATIVE STATE: ICD-10-CM

## 2018-09-21 LAB
ALBUMIN SERPL BCP-MCNC: 3.6 G/DL
ALP SERPL-CCNC: 102 U/L
ALT SERPL W/O P-5'-P-CCNC: 38 U/L
ANION GAP SERPL CALC-SCNC: 9 MMOL/L
AST SERPL-CCNC: 20 U/L
BILIRUB SERPL-MCNC: 0.3 MG/DL
BUN SERPL-MCNC: 9 MG/DL
CALCIUM SERPL-MCNC: 9.2 MG/DL
CHLORIDE SERPL-SCNC: 103 MMOL/L
CO2 SERPL-SCNC: 24 MMOL/L
CREAT SERPL-MCNC: 0.7 MG/DL
EST. GFR  (AFRICAN AMERICAN): >60 ML/MIN/1.73 M^2
EST. GFR  (NON AFRICAN AMERICAN): >60 ML/MIN/1.73 M^2
GLUCOSE SERPL-MCNC: 105 MG/DL
POTASSIUM SERPL-SCNC: 3.9 MMOL/L
PROT SERPL-MCNC: 7 G/DL
SODIUM SERPL-SCNC: 136 MMOL/L

## 2018-09-21 PROCEDURE — 36415 COLL VENOUS BLD VENIPUNCTURE: CPT

## 2018-09-21 PROCEDURE — 80053 COMPREHEN METABOLIC PANEL: CPT

## 2018-09-26 ENCOUNTER — OFFICE VISIT (OUTPATIENT)
Dept: FAMILY MEDICINE | Facility: CLINIC | Age: 48
End: 2018-09-26
Payer: COMMERCIAL

## 2018-09-26 ENCOUNTER — DOCUMENTATION ONLY (OUTPATIENT)
Dept: FAMILY MEDICINE | Facility: CLINIC | Age: 48
End: 2018-09-26

## 2018-09-26 VITALS
TEMPERATURE: 98 F | BODY MASS INDEX: 38.91 KG/M2 | OXYGEN SATURATION: 97 % | HEIGHT: 64 IN | SYSTOLIC BLOOD PRESSURE: 98 MMHG | DIASTOLIC BLOOD PRESSURE: 68 MMHG | HEART RATE: 72 BPM | RESPIRATION RATE: 16 BRPM | WEIGHT: 227.94 LBS

## 2018-09-26 DIAGNOSIS — Z12.39 BREAST CANCER SCREENING: ICD-10-CM

## 2018-09-26 DIAGNOSIS — Z23 NEED FOR 23-POLYVALENT PNEUMOCOCCAL POLYSACCHARIDE VACCINE: ICD-10-CM

## 2018-09-26 DIAGNOSIS — R53.83 FATIGUE, UNSPECIFIED TYPE: ICD-10-CM

## 2018-09-26 DIAGNOSIS — Z13.220 LIPID SCREENING: ICD-10-CM

## 2018-09-26 DIAGNOSIS — M94.0 COSTOCHONDRITIS, ACUTE: Primary | ICD-10-CM

## 2018-09-26 DIAGNOSIS — Z23 NEEDS FLU SHOT: ICD-10-CM

## 2018-09-26 PROCEDURE — 3008F BODY MASS INDEX DOCD: CPT | Mod: CPTII,S$GLB,, | Performed by: INTERNAL MEDICINE

## 2018-09-26 PROCEDURE — 90686 IIV4 VACC NO PRSV 0.5 ML IM: CPT | Mod: S$GLB,,, | Performed by: INTERNAL MEDICINE

## 2018-09-26 PROCEDURE — 99213 OFFICE O/P EST LOW 20 MIN: CPT | Mod: 25,S$GLB,, | Performed by: INTERNAL MEDICINE

## 2018-09-26 PROCEDURE — 90471 IMMUNIZATION ADMIN: CPT | Mod: S$GLB,,, | Performed by: INTERNAL MEDICINE

## 2018-09-26 NOTE — PATIENT INSTRUCTIONS
Get a smart temp cold pack.    Lose 5 pounds.  Suggest [place]ett diet    Tylenol or Motrin for pain

## 2018-09-26 NOTE — PROGRESS NOTES
Health Maintenance Due   Topic Date Due    Lipid Panel  1970    TETANUS VACCINE  04/28/1988    Pneumococcal PPSV23 (Medium Risk) (1) 04/28/1988    Mammogram  04/28/2010    Influenza Vaccine  08/01/2018

## 2018-09-26 NOTE — PROGRESS NOTES
"Subjective:       Patient ID: Gi Abdullahi is a 48 y.o. female.    Chief Complaint: Follow-up; gallbladder surgery; and Rash (under breast)    HPI       CHIEF COMPLAINT: CHEST PAIN    HPI:  Had a cholecystectomy about 17 days ago..  Since that time she has been having left upper quadrant pain/rib pain.    ONSET/TIMIN d   ago    DURATION:  Intermittent  QUALITY/COURSE:  unchanged.    SEVERITY: #    3  /10 ( on 1 to 10 scale)        LOCATION:  Left inferior costal margin   Radiation -  none    All choices for next 4 sections below are positive to the patient ONLY if BOLDED, otherwise negative:    AGGRAVATING FACTORS: Swallowing, , Deep_Breathing, Coughing, Neck/Arm/Chest_Movement     RELIEVING FACTORS: Nitroglycerin, Resting, Change_of_Position    ASSOCIATED SYMPTOMS:  Hemoptysis,Tenderness,  Leg_Pain    RISK FACTORS: Diabetes, HTN, Hyperlipidemia, smoking,           Recently quit smoking            Review of Systems      Objective:      Vitals:    18 1540   BP: 98/68   Pulse: 72   Resp: 16   Temp: 98.3 °F (36.8 °C)   TempSrc: Oral   SpO2: 97%   Weight: 103.4 kg (227 lb 15.3 oz)   Height: 5' 4" (1.626 m)   PainSc:   3   PainLoc: Abdomen     Physical Exam   Constitutional: She appears well-developed and well-nourished.   Cardiovascular: Normal rate, regular rhythm and normal heart sounds.   Pulmonary/Chest: Effort normal and breath sounds normal. She exhibits tenderness (Left lower costal margin anteriorly is tender.).   Abdominal: Soft. There is no tenderness.   Neurological: She is alert.   Psychiatric: She has a normal mood and affect. Her behavior is normal. Thought content normal.   Nursing note and vitals reviewed.      liver function tests have normalized.  Assessment:       1. Costochondritis, acute    2. Lipid screening    3. Need for 23-polyvalent pneumococcal polysaccharide vaccine    4. Fatigue, unspecified type    5. Breast cancer screening    6. Needs flu shot          Plan: "       Costochondritis, acute    Lipid screening  -     Lipid panel; Future; Expected date: 09/26/2018    Need for 23-polyvalent pneumococcal polysaccharide vaccine  -     Pneumococcal Polysaccharide Vaccine (23 Valent) (SQ/IM)    Fatigue, unspecified type  -     TSH; Future; Expected date: 09/26/2018    Breast cancer screening  -     Mammo Digital Screening Bilat with CAD; Future; Expected date: 09/26/2018    Needs flu shot  -     Influenza - Quadrivalent (3 years & older) (PF)      Follow-up in about 6 months (around 3/26/2019) for if you are not better return in 2 weeks.

## 2018-09-27 PROCEDURE — 90471 IMMUNIZATION ADMIN: CPT | Mod: S$GLB,,, | Performed by: INTERNAL MEDICINE

## 2018-09-27 PROCEDURE — 90732 PPSV23 VACC 2 YRS+ SUBQ/IM: CPT | Mod: S$GLB,,, | Performed by: INTERNAL MEDICINE

## 2018-09-27 NOTE — PROGRESS NOTES
Patient ID by name and  Flu vac unit adult dose 0.50 ml IM given in left deltoid pneumonia 23 right deltoid Tolerated well Aseptic tech used, no bleeding at the site noted observed for 15 min no adverse reaction noted.

## 2019-04-12 DIAGNOSIS — F32.A ANXIETY AND DEPRESSION: ICD-10-CM

## 2019-04-12 DIAGNOSIS — F41.9 ANXIETY AND DEPRESSION: ICD-10-CM

## 2019-04-12 RX ORDER — FLUOXETINE HYDROCHLORIDE 40 MG/1
CAPSULE ORAL
Qty: 90 CAPSULE | Refills: 3 | Status: SHIPPED | OUTPATIENT
Start: 2019-04-12 | End: 2019-04-17

## 2019-04-13 ENCOUNTER — LAB VISIT (OUTPATIENT)
Dept: LAB | Facility: HOSPITAL | Age: 49
End: 2019-04-13
Attending: INTERNAL MEDICINE
Payer: COMMERCIAL

## 2019-04-13 DIAGNOSIS — Z13.220 LIPID SCREENING: ICD-10-CM

## 2019-04-13 DIAGNOSIS — R53.83 FATIGUE, UNSPECIFIED TYPE: ICD-10-CM

## 2019-04-13 LAB
CHOLEST SERPL-MCNC: 209 MG/DL (ref 120–199)
CHOLEST/HDLC SERPL: 3.1 {RATIO} (ref 2–5)
HDLC SERPL-MCNC: 67 MG/DL (ref 40–75)
HDLC SERPL: 32.1 % (ref 20–50)
LDLC SERPL CALC-MCNC: 123.8 MG/DL (ref 63–159)
NONHDLC SERPL-MCNC: 142 MG/DL
TRIGL SERPL-MCNC: 91 MG/DL (ref 30–150)
TSH SERPL DL<=0.005 MIU/L-ACNC: 1.18 UIU/ML (ref 0.4–4)

## 2019-04-13 PROCEDURE — 84443 ASSAY THYROID STIM HORMONE: CPT

## 2019-04-13 PROCEDURE — 80061 LIPID PANEL: CPT

## 2019-04-13 PROCEDURE — 36415 COLL VENOUS BLD VENIPUNCTURE: CPT | Mod: PO

## 2019-04-17 ENCOUNTER — DOCUMENTATION ONLY (OUTPATIENT)
Dept: FAMILY MEDICINE | Facility: CLINIC | Age: 49
End: 2019-04-17

## 2019-04-17 ENCOUNTER — OFFICE VISIT (OUTPATIENT)
Dept: FAMILY MEDICINE | Facility: CLINIC | Age: 49
End: 2019-04-17
Payer: COMMERCIAL

## 2019-04-17 VITALS
SYSTOLIC BLOOD PRESSURE: 126 MMHG | WEIGHT: 228.19 LBS | DIASTOLIC BLOOD PRESSURE: 84 MMHG | TEMPERATURE: 98 F | RESPIRATION RATE: 16 BRPM | HEART RATE: 80 BPM | BODY MASS INDEX: 38.96 KG/M2 | OXYGEN SATURATION: 97 % | HEIGHT: 64 IN

## 2019-04-17 DIAGNOSIS — R23.2 HOT FLASHES: ICD-10-CM

## 2019-04-17 DIAGNOSIS — R53.81 MALAISE AND FATIGUE: ICD-10-CM

## 2019-04-17 DIAGNOSIS — F32.A ANXIETY AND DEPRESSION: ICD-10-CM

## 2019-04-17 DIAGNOSIS — R53.83 MALAISE AND FATIGUE: ICD-10-CM

## 2019-04-17 DIAGNOSIS — G47.33 OBSTRUCTIVE SLEEP APNEA: Primary | ICD-10-CM

## 2019-04-17 DIAGNOSIS — R19.7 DIARRHEA, UNSPECIFIED TYPE: ICD-10-CM

## 2019-04-17 DIAGNOSIS — F41.9 ANXIETY AND DEPRESSION: ICD-10-CM

## 2019-04-17 DIAGNOSIS — E66.9 CLASS 1 OBESITY WITH SERIOUS COMORBIDITY AND BODY MASS INDEX (BMI) OF 30.0 TO 30.9 IN ADULT, UNSPECIFIED OBESITY TYPE: ICD-10-CM

## 2019-04-17 PROCEDURE — 3008F PR BODY MASS INDEX (BMI) DOCUMENTED: ICD-10-PCS | Mod: CPTII,S$GLB,, | Performed by: INTERNAL MEDICINE

## 2019-04-17 PROCEDURE — 99214 OFFICE O/P EST MOD 30 MIN: CPT | Mod: S$GLB,,, | Performed by: INTERNAL MEDICINE

## 2019-04-17 PROCEDURE — 99214 PR OFFICE/OUTPT VISIT, EST, LEVL IV, 30-39 MIN: ICD-10-PCS | Mod: S$GLB,,, | Performed by: INTERNAL MEDICINE

## 2019-04-17 PROCEDURE — 3008F BODY MASS INDEX DOCD: CPT | Mod: CPTII,S$GLB,, | Performed by: INTERNAL MEDICINE

## 2019-04-17 RX ORDER — CHOLESTYRAMINE 4 G/4.8G
4 POWDER, FOR SUSPENSION ORAL 2 TIMES DAILY
Qty: 180 PACKET | Refills: 3 | Status: ON HOLD | OUTPATIENT
Start: 2019-04-17 | End: 2019-11-08

## 2019-04-17 RX ORDER — PHENTERMINE HYDROCHLORIDE 37.5 MG/1
37.5 TABLET ORAL
Qty: 30 TABLET | Refills: 0 | Status: SHIPPED | OUTPATIENT
Start: 2019-04-17 | End: 2019-05-17

## 2019-04-17 RX ORDER — FLUOXETINE HYDROCHLORIDE 20 MG/1
60 CAPSULE ORAL DAILY
Qty: 270 CAPSULE | Refills: 1 | Status: SHIPPED | OUTPATIENT
Start: 2019-04-17 | End: 2020-12-08 | Stop reason: DRUGHIGH

## 2019-04-17 NOTE — PROGRESS NOTES
Subjective:       Patient ID: Gi Abdullahi is a 48 y.o. female.    Chief Complaint: Follow-up and Fatigue (labs,pt doesnt need refills today)    HPI     Had cholecystectomy.    CHIEF COMPLAINT: Hyperlipidemia. cholesterol screening: yes.   HPI:     ONSET:    MODIFIERS/TREATMENTS: . Taking medications: no. . Non-compliance with following diet: no. .     SYMPTOMS/RELATED:Possible medication side effects include:   Myalgia: no.  .     REVIEW OF SYMPTOMS: past weights:   Wt Readings from Last 1 Encounters:   04/17/19 1301 103.5 kg (228 lb 2.8 oz)                                                     Last lipids: total   Lab Results   Component Value Date    CHOL 209 (H) 04/13/2019                                                                     HDL   Lab Results   Component Value Date    HDL 67 04/13/2019                                                                     LDL   Lab Results   Component Value Date    LDLCALC 123.8 04/13/2019                                                                     TRIG   Lab Results   Component Value Date    TRIG 91 04/13/2019                                                                        The patient falls asleep whenever she is holding still likes watching television.  She snores badly and has had witnessed sleep apnea.    CHIEF COMPLAINT: Fatigue(+).  HPI:     ONSET/TIMING: Onset      ago. Sudden: .no . .Similar problems in past? yes     DURATION:   constant    QUALITY/COURSE:  worse    INTENSITY/SEVERITY:.Severity is #       7 (10 point scale)    SYMPTOMS/RELATED:  Possible medication side effects include:     The following symptoms/statements are positive if BOLD, negative otherwise.        CONTEXT/WHEN:.--Fatigued_after_good_night's_rest. Worse_in_evenings.  Worse_after_taking_a_medication.. Similar_problems.    Exposure_to_others_with_similar_symptoms.    MODIFIERS/TREATMENTS:    Exercise.    meds:     REVIEW OF SYMPTOMS:  Anorexia. Depression. Stress. Fever.  "Headache. Neck_Pain. Lymphadenopathy. Sore_Throat. Rhinorrhea. Coryza. Cough. Chest_Pain. Abdomen_Pain. Nausea. Diarrhea.since cholecystectomy Urinary_Problems. Rash. Tick_Bites. Weight-gain.  Weight-loss.   Arthralgias . Loss-of-concentration. Loss-of-interests. Disordered-sleep. Cold-intolerance.  Heat-intolerance.  polyuria. polydipsia.    Review of Systems      Objective:      Vitals:    04/17/19 1301   BP: 126/84   Pulse: 80   Resp: 16   Temp: 98.4 °F (36.9 °C)   TempSrc: Oral   SpO2: 97%   Weight: 103.5 kg (228 lb 2.8 oz)   Height: 5' 4" (1.626 m)   PainSc: 0-No pain     Physical Exam   Constitutional: She appears well-developed and well-nourished.   Cardiovascular: Normal rate, regular rhythm and normal heart sounds.   Pulmonary/Chest: Effort normal and breath sounds normal.   Abdominal: Soft. There is no tenderness.   Neurological: She is alert.   Psychiatric: She has a normal mood and affect. Her behavior is normal. Thought content normal.   Nursing note and vitals reviewed.        Assessment:       1. Obstructive sleep apnea    2. Malaise and fatigue    3. Diarrhea, unspecified type    4. Hot flashes    5. Anxiety and depression    6. Class 1 obesity with serious comorbidity and body mass index (BMI) of 30.0 to 30.9 in adult, unspecified obesity type          Plan:       Obstructive sleep apnea  -     Ambulatory referral to Pulmonology    Malaise and fatigue  -     CBC auto differential; Future; Expected date: 04/17/2019  -     Comprehensive metabolic panel; Future; Expected date: 04/17/2019  -     Vitamin B12; Future; Expected date: 04/17/2019    Diarrhea, unspecified type  -     cholestyramine-aspartame (CHOLESTYRAMINE LIGHT) 4 gram PwPk; Take 1 packet (4 g total) by mouth 2 (two) times daily. Between meals  Dispense: 180 packet; Refill: 3    Hot flashes  -     FLUoxetine 20 MG capsule; Take 3 capsules (60 mg total) by mouth once daily.  Dispense: 270 capsule; Refill: 1    Anxiety and depression  -     " FLUoxetine 20 MG capsule; Take 3 capsules (60 mg total) by mouth once daily.  Dispense: 270 capsule; Refill: 1    Class 1 obesity with serious comorbidity and body mass index (BMI) of 30.0 to 30.9 in adult, unspecified obesity type  -     phentermine (ADIPEX-P) 37.5 mg tablet; Take 1 tablet (37.5 mg total) by mouth before breakfast.  Dispense: 30 tablet; Refill: 0      Follow up in about 1 month (around 5/17/2019).

## 2019-04-17 NOTE — PROGRESS NOTES
Health Maintenance Due   Topic Date Due    TETANUS VACCINE  04/28/1988    Mammogram  04/28/2010

## 2019-05-08 ENCOUNTER — PATIENT OUTREACH (OUTPATIENT)
Dept: ADMINISTRATIVE | Facility: HOSPITAL | Age: 49
End: 2019-05-08

## 2019-05-08 NOTE — LETTER
May 16, 2019    Gi Abdullahi  [address]  [place] [address]             [facility]  [address]  [facility] [address]  Phone: [phone]

## 2019-05-08 NOTE — LETTER
May 16, 2019    Gichuck Abdullahi  [address] [facility] [address]             [facility]  [address]  [facility] [address]  Phone: [phone] Dear Tam[facility] is committed to your overall health and would like to ensure that you are up to date on your recommended test and/or procedures.   Cani Cristobal MD  has found that your chart shows you may be due for the following:     MAMMOGRAM     If you have had any of the above done at another facility, please let us know so that we may obtain copies from that facility.  If you have a copy of these records, please provide a copy for us to scan into your chart.  You are welcome to request that the report be faxed to us at  ([phone]).     Otherwise, please schedule these appointments at your earliest convenience by calling [phone] or going to [email or web address].     If you have an upcoming scheduled appointment for the item above, please disregard this letter.     Sincerely,   Your [facility]   MD Corie Kirkpatrick LPN Clinical Care Coordinator   [address] [place] [address]   [phone]

## 2019-05-18 ENCOUNTER — LAB VISIT (OUTPATIENT)
Dept: LAB | Facility: HOSPITAL | Age: 49
End: 2019-05-18
Attending: INTERNAL MEDICINE
Payer: COMMERCIAL

## 2019-05-18 DIAGNOSIS — R53.83 MALAISE AND FATIGUE: ICD-10-CM

## 2019-05-18 DIAGNOSIS — R53.81 MALAISE AND FATIGUE: ICD-10-CM

## 2019-05-18 LAB
ALBUMIN SERPL BCP-MCNC: 3.6 G/DL (ref 3.5–5.2)
ALP SERPL-CCNC: 85 U/L (ref 55–135)
ALT SERPL W/O P-5'-P-CCNC: 26 U/L (ref 10–44)
ANION GAP SERPL CALC-SCNC: 9 MMOL/L (ref 8–16)
AST SERPL-CCNC: 20 U/L (ref 10–40)
BASOPHILS # BLD AUTO: 0.06 K/UL (ref 0–0.2)
BASOPHILS NFR BLD: 1.1 % (ref 0–1.9)
BILIRUB SERPL-MCNC: 0.3 MG/DL (ref 0.1–1)
BUN SERPL-MCNC: 11 MG/DL (ref 6–20)
CALCIUM SERPL-MCNC: 9.7 MG/DL (ref 8.7–10.5)
CHLORIDE SERPL-SCNC: 106 MMOL/L (ref 95–110)
CO2 SERPL-SCNC: 24 MMOL/L (ref 23–29)
CREAT SERPL-MCNC: 0.7 MG/DL (ref 0.5–1.4)
DIFFERENTIAL METHOD: ABNORMAL
EOSINOPHIL # BLD AUTO: 0.2 K/UL (ref 0–0.5)
EOSINOPHIL NFR BLD: 2.8 % (ref 0–8)
ERYTHROCYTE [DISTWIDTH] IN BLOOD BY AUTOMATED COUNT: 14.1 % (ref 11.5–14.5)
EST. GFR  (AFRICAN AMERICAN): >60 ML/MIN/1.73 M^2
EST. GFR  (NON AFRICAN AMERICAN): >60 ML/MIN/1.73 M^2
GLUCOSE SERPL-MCNC: 81 MG/DL (ref 70–110)
HCT VFR BLD AUTO: 46 % (ref 37–48.5)
HGB BLD-MCNC: 15 G/DL (ref 12–16)
IMM GRANULOCYTES # BLD AUTO: 0.01 K/UL (ref 0–0.04)
IMM GRANULOCYTES NFR BLD AUTO: 0.2 % (ref 0–0.5)
LYMPHOCYTES # BLD AUTO: 1.9 K/UL (ref 1–4.8)
LYMPHOCYTES NFR BLD: 35.3 % (ref 18–48)
MCH RBC QN AUTO: 29.4 PG (ref 27–31)
MCHC RBC AUTO-ENTMCNC: 32.6 G/DL (ref 32–36)
MCV RBC AUTO: 90 FL (ref 82–98)
MONOCYTES # BLD AUTO: 0.4 K/UL (ref 0.3–1)
MONOCYTES NFR BLD: 7.4 % (ref 4–15)
NEUTROPHILS # BLD AUTO: 2.8 K/UL (ref 1.8–7.7)
NEUTROPHILS NFR BLD: 53.2 % (ref 38–73)
NRBC BLD-RTO: 0 /100 WBC
PLATELET # BLD AUTO: 391 K/UL (ref 150–350)
PMV BLD AUTO: 10.5 FL (ref 9.2–12.9)
POTASSIUM SERPL-SCNC: 4.1 MMOL/L (ref 3.5–5.1)
PROT SERPL-MCNC: 7.1 G/DL (ref 6–8.4)
RBC # BLD AUTO: 5.1 M/UL (ref 4–5.4)
SODIUM SERPL-SCNC: 139 MMOL/L (ref 136–145)
VIT B12 SERPL-MCNC: 613 PG/ML (ref 210–950)
WBC # BLD AUTO: 5.27 K/UL (ref 3.9–12.7)

## 2019-05-18 PROCEDURE — 82607 VITAMIN B-12: CPT

## 2019-05-18 PROCEDURE — 85025 COMPLETE CBC W/AUTO DIFF WBC: CPT

## 2019-05-18 PROCEDURE — 80053 COMPREHEN METABOLIC PANEL: CPT

## 2019-05-18 PROCEDURE — 36415 COLL VENOUS BLD VENIPUNCTURE: CPT | Mod: PO

## 2019-05-22 ENCOUNTER — DOCUMENTATION ONLY (OUTPATIENT)
Dept: FAMILY MEDICINE | Facility: CLINIC | Age: 49
End: 2019-05-22

## 2019-05-22 ENCOUNTER — OFFICE VISIT (OUTPATIENT)
Dept: FAMILY MEDICINE | Facility: CLINIC | Age: 49
End: 2019-05-22
Payer: COMMERCIAL

## 2019-05-22 VITALS
HEIGHT: 64 IN | WEIGHT: 215.19 LBS | DIASTOLIC BLOOD PRESSURE: 82 MMHG | TEMPERATURE: 98 F | OXYGEN SATURATION: 95 % | SYSTOLIC BLOOD PRESSURE: 118 MMHG | RESPIRATION RATE: 16 BRPM | BODY MASS INDEX: 36.74 KG/M2 | HEART RATE: 81 BPM

## 2019-05-22 DIAGNOSIS — E66.9 CLASS 2 OBESITY WITH BODY MASS INDEX (BMI) OF 36.0 TO 36.9 IN ADULT, UNSPECIFIED OBESITY TYPE, UNSPECIFIED WHETHER SERIOUS COMORBIDITY PRESENT: ICD-10-CM

## 2019-05-22 DIAGNOSIS — R53.83 MALAISE AND FATIGUE: ICD-10-CM

## 2019-05-22 DIAGNOSIS — R53.81 MALAISE AND FATIGUE: ICD-10-CM

## 2019-05-22 DIAGNOSIS — M67.40 GANGLION CYST: Primary | ICD-10-CM

## 2019-05-22 PROCEDURE — 99214 PR OFFICE/OUTPT VISIT, EST, LEVL IV, 30-39 MIN: ICD-10-PCS | Mod: S$GLB,,, | Performed by: INTERNAL MEDICINE

## 2019-05-22 PROCEDURE — 99214 OFFICE O/P EST MOD 30 MIN: CPT | Mod: S$GLB,,, | Performed by: INTERNAL MEDICINE

## 2019-05-22 PROCEDURE — 3008F PR BODY MASS INDEX (BMI) DOCUMENTED: ICD-10-PCS | Mod: CPTII,S$GLB,, | Performed by: INTERNAL MEDICINE

## 2019-05-22 PROCEDURE — 3008F BODY MASS INDEX DOCD: CPT | Mod: CPTII,S$GLB,, | Performed by: INTERNAL MEDICINE

## 2019-05-22 RX ORDER — PHENTERMINE HYDROCHLORIDE 37.5 MG/1
37.5 TABLET ORAL
Qty: 30 TABLET | Refills: 0 | Status: SHIPPED | OUTPATIENT
Start: 2019-06-22 | End: 2019-07-22

## 2019-05-22 RX ORDER — PHENTERMINE HYDROCHLORIDE 37.5 MG/1
37.5 TABLET ORAL
Qty: 30 TABLET | Refills: 0 | Status: SHIPPED | OUTPATIENT
Start: 2019-05-22 | End: 2019-06-21

## 2019-05-22 NOTE — PROGRESS NOTES
Subjective:       Patient ID: Gi Abdullahi is a 49 y.o. female.    Chief Complaint: Fatigue (labs,refill adipex) and knot on hand    HPI     Exercising and dieting, lost 13 lbs.     CHIEF COMPLAINT: Upper_Extremity_Problems. Pain: yes.. Weakness: no . Injury: no .  HPI:    ONSET/TIMING: . Onset  3 wks ago.  Gradual. Work related: no.  Similar_problems_in past: no.     DURATION: .          Paroxysmal: no .    QUALITY/COURSE:  better    LOCATION:   Right wrist .  . Radiation: no.    INTENSITY/SEVERITY:. Severity is # 6   (10 point scale).    CONTEXT/WHEN: . Date_Expected_Work_Return is . Activity: yes. . Abduction greater than 90 degrees: no.. Inactivity: no      MODIFIERS/TREATMENTS:  Current_Limitations_are.        Taking medications: no.  Physical_Therapy: no .  Chiropractor: no . . Litigation_pending: no. . X-rays: no.. CT: no.. MRI: no.    SYMPTOMS/RELATED: . --Possible medication side effects include:.     The following symptoms are positive if BOLD, negative otherwise.       REVIEW OF SYMPTOMS:   Numbness. Weakness. Muscle_Problems. Fever. Joint_Problems. Swelling. Erythema. Weight_loss.      CHIEF COMPLAINT: Fatigue(+).  HPI:  Since she lost the weight she is not snoring is much.  She wants to see if she needs the sleep study in after she has lost weight.    ONSET/TIMING: Onset  yrs    ago. Sudden: .no . .Similar problems in past? yes     DURATION:       QUALITY/COURSE:  better    INTENSITY/SEVERITY:.Severity is #       4 (10 point scale)    SYMPTOMS/RELATED:  Possible medication side effects include:     The following symptoms/statements are positive if BOLD, negative otherwise.        CONTEXT/WHEN:.--Fatigued_after_good_night's_rest. Worse_in_evenings.  Worse_after_taking_a_medication.. Similar_problems.    Exposure_to_others_with_similar_symptoms.    MODIFIERS/TREATMENTS:    Exercise.    meds:     REVIEW OF SYMPTOMS:  Anorexia. Depression. Stress. Fever. Headache. Neck_Pain. Lymphadenopathy. Sore_Throat.  "Rhinorrhea. Coryza. Cough. Chest_Pain. Abdomen_Pain. Nausea. Diarrhea. Urinary_Problems. Rash. Tick_Bites. Weight-gain.  Weight-loss.   Arthralgias . Loss-of-concentration. Loss-of-interests. Disordered-sleep. Cold-intolerance.  Heat-intolerance.  polyuria. polydipsia.    Review of Systems      Objective:      Vitals:    05/22/19 1300   BP: 118/82   Pulse: 81   Resp: 16   Temp: 98.1 °F (36.7 °C)   TempSrc: Oral   SpO2: 95%   Weight: 97.6 kg (215 lb 2.7 oz)   Height: 5' 4" (1.626 m)   PainSc:   6   PainLoc: Hand     Physical Exam   Constitutional: She appears well-developed and well-nourished.   Cardiovascular: Normal rate, regular rhythm and normal heart sounds.   Pulmonary/Chest: Effort normal and breath sounds normal.   Abdominal: Soft. There is no tenderness.   Musculoskeletal:   Tender 1 cm dorsal ganglion cyst on the right wrist.  Pain with extension or flexion   Neurological: She is alert.   Psychiatric: She has a normal mood and affect. Her behavior is normal. Thought content normal.   Nursing note and vitals reviewed.        Assessment:       1. Ganglion cyst    2. Class 2 obesity with body mass index (BMI) of 36.0 to 36.9 in adult, unspecified obesity type, unspecified whether serious comorbidity present    3. Malaise and fatigue          Plan:       Ganglion cyst  -     Ambulatory Referral to Orthopedics    Class 2 obesity with body mass index (BMI) of 36.0 to 36.9 in adult, unspecified obesity type, unspecified whether serious comorbidity present  -     phentermine (ADIPEX-P) 37.5 mg tablet; Take 1 tablet (37.5 mg total) by mouth before breakfast.  Dispense: 30 tablet; Refill: 0  -     phentermine (ADIPEX-P) 37.5 mg tablet; Take 1 tablet (37.5 mg total) by mouth before breakfast.  Dispense: 30 tablet; Refill: 0    Malaise and fatigue      Follow up in about 5 months (around 10/22/2019).      "

## 2019-05-22 NOTE — PATIENT INSTRUCTIONS
Strongly recommend that you get the sleep study.    Lose 5 pounds.  Suggest [facility]    Wear a wrist splint on the right wrist

## 2019-09-11 ENCOUNTER — TELEPHONE (OUTPATIENT)
Dept: FAMILY MEDICINE | Facility: CLINIC | Age: 49
End: 2019-09-11

## 2019-09-11 NOTE — TELEPHONE ENCOUNTER
----- Message from Yoly Vasquez sent at 9/11/2019  3:02 PM CDT -----  Contact: Aimee garcia/ Dr Loomis's ofc  Type: Needs Medical Advice    Who Called:  Aimee  Best Call Back Number: [phone]  Additional Information: Pls call Aimee regarding faxing over pt's lab work that was just recently done/plus other records.

## 2019-10-02 ENCOUNTER — HOSPITAL ENCOUNTER (OUTPATIENT)
Dept: RADIOLOGY | Facility: HOSPITAL | Age: 49
Discharge: HOME | End: 2019-10-02
Attending: INTERNAL MEDICINE
Payer: COMMERCIAL

## 2019-10-02 VITALS — WEIGHT: 215.19 LBS | BODY MASS INDEX: 36.74 KG/M2 | HEIGHT: 64 IN

## 2019-10-02 DIAGNOSIS — Z12.39 BREAST CANCER SCREENING: ICD-10-CM

## 2019-10-02 PROCEDURE — 77067 SCR MAMMO BI INCL CAD: CPT | Mod: TC,PO

## 2019-10-03 DIAGNOSIS — R92.8 ABNORMAL MAMMOGRAM: Primary | ICD-10-CM

## 2019-10-16 ENCOUNTER — HOSPITAL ENCOUNTER (OUTPATIENT)
Dept: RADIOLOGY | Facility: HOSPITAL | Age: 49
Discharge: HOME | End: 2019-10-16
Attending: INTERNAL MEDICINE
Payer: COMMERCIAL

## 2019-10-16 DIAGNOSIS — R92.8 ABNORMAL MAMMOGRAM: ICD-10-CM

## 2019-10-16 PROCEDURE — 76642 ULTRASOUND BREAST LIMITED: CPT | Mod: TC,PO,RT

## 2019-10-21 ENCOUNTER — OFFICE VISIT (OUTPATIENT)
Dept: SURGERY | Facility: CLINIC | Age: 49
End: 2019-10-21
Payer: COMMERCIAL

## 2019-10-21 VITALS
BODY MASS INDEX: 36.7 KG/M2 | HEART RATE: 98 BPM | HEIGHT: 64 IN | WEIGHT: 215 LBS | DIASTOLIC BLOOD PRESSURE: 82 MMHG | SYSTOLIC BLOOD PRESSURE: 122 MMHG | TEMPERATURE: 98 F

## 2019-10-21 DIAGNOSIS — N63.10 BREAST MASS, RIGHT: Primary | ICD-10-CM

## 2019-10-21 PROCEDURE — 99203 OFFICE O/P NEW LOW 30 MIN: CPT | Mod: S$GLB,,, | Performed by: SURGERY

## 2019-10-21 PROCEDURE — 99203 PR OFFICE/OUTPT VISIT, NEW, LEVL III, 30-44 MIN: ICD-10-PCS | Mod: S$GLB,,, | Performed by: SURGERY

## 2019-10-21 NOTE — LETTER
October 21, 2019      Cain Cristobal MD  [address]  [place] [address]           [facility]  [address]  [place] [address]  Phone: [phone]  Fax: [phone]          Patient: Gi Abdullahi   MR Number: [id number]   YOB: 1970   Date of Visit: 10/21/2019       Dear Dr. Cain Cristobal:    Thank you for referring Gi Abdullahi to me for evaluation. Attached you will find relevant portions of my assessment and plan of care.    If you have questions, please do not hesitate to call me. I look forward to following Gi Abdullahi along with you.    Sincerely,    Roni Arias MD    Enclosure  CC:  No Recipients    If you would like to receive this communication electronically, please contact [email or web address] or [phone] to request more information on [employer] Link access.    For providers and/or their staff who would like to refer a patient to [facility], please contact us through our one-stop-shop provider referral line, [facility], at [phone].    If you feel you have received this communication in error or would no longer like to receive these types of communications, please e-mail [email or web address]

## 2019-10-21 NOTE — PROGRESS NOTES
Subjective:       Patient ID: Gi Abdullahi is a 49 y.o. female.    Chief Complaint: Consult ([facility] referred for right breast bx )      HPI:  Patient presents for evaluation of right breast mass.  The mass is not palpable nor does patient have any symptoms.  She had a screening mammogram which showed a circumscribed abnormality in the anterior right breast.  It measured 4 mm in size.  Patient subsequently had a ultrasound which showed a corresponding abnormality at 9:00 a.m. position of the right breast consistent with enlarged duct versus a fibroadenoma.    Patient had no previous breast problems.  No family history of breast cancer.  She has had children.  Patient had a hysterectomy at age 27 for cervical cancer.  She had 1 ovary left.  She is not sure whether not she is going through menopause although she is having some hot flashes.  She is not on any hormone replacement.    Past Medical History:   Diagnosis Date    Arthritis     Cervical cancer      Past Surgical History:   Procedure Laterality Date    CERVIX REMOVAL      HYSTERECTOMY      LAPAROSCOPIC CHOLECYSTECTOMY N/A 9/3/2018    Procedure: CHOLECYSTECTOMY, LAPAROSCOPIC;  Surgeon: Wilmer Degroot MD;  Location: Replaced by [facility];  Service: General;  Laterality: N/A;    TONSILLECTOMY       Review of patient's allergies indicates:  No Known Allergies  Medication List with Changes/Refills   Current Medications    CHOLESTYRAMINE-ASPARTAME (CHOLESTYRAMINE LIGHT) 4 GRAM PWPK    Take 1 packet (4 g total) by mouth 2 (two) times daily. Between meals    CYANOCOBALAMIN, VITAMIN B-12, ORAL    Take by mouth once daily.    FLUOXETINE 20 MG CAPSULE    Take 3 capsules (60 mg total) by mouth once daily.    MULTIVITAMIN (THERAGRAN) TABLET    Take 1 tablet by mouth once daily.    NICOTINE (NICODERM CQ) 14 MG/24 HR    Place 1 patch onto the skin once daily.     Family History   Problem Relation Age of Onset    Hypertension Mother     Lymphoma Father      Social History      Socioeconomic History    Marital status:      Spouse name: Not on file    Number of children: Not on file    Years of education: Not on file    Highest education level: Not on file   Occupational History    Not on file   Social Needs    Financial resource strain: Not on file    Food insecurity:     Worry: Not on file     Inability: Not on file    Transportation needs:     Medical: Not on file     Non-medical: Not on file   Tobacco Use    Smoking status: Current Every Day Smoker     Packs/day: 1.00    Smokeless tobacco: Never Used   Substance and Sexual Activity    Alcohol use: No     Frequency: Never    Drug use: No    Sexual activity: Not on file   Lifestyle    Physical activity:     Days per week: Not on file     Minutes per session: Not on file    Stress: Not on file   Relationships    Social connections:     Talks on phone: Not on file     Gets together: Not on file     Attends Orthodoxy service: Not on file     Active member of club or organization: Not on file     Attends meetings of clubs or organizations: Not on file     Relationship status: Not on file   Other Topics Concern    Not on file   Social History Narrative    Not on file         Review of Systems   Constitutional: Negative for appetite change, chills, fever and unexpected weight change.   HENT: Negative for hearing loss, rhinorrhea, sore throat and voice change.    Eyes: Negative for photophobia and visual disturbance.   Respiratory: Negative for cough, choking and shortness of breath.    Cardiovascular: Negative for chest pain, palpitations and leg swelling.   Gastrointestinal: Negative for abdominal pain, blood in stool, constipation, diarrhea, nausea and vomiting.   Endocrine: Negative for cold intolerance, heat intolerance, polydipsia and polyuria.   Musculoskeletal: Negative for arthralgias, back pain, joint swelling and neck stiffness.   Skin: Negative for color change, pallor and rash.   Neurological: Negative  for dizziness, seizures, syncope and headaches.   Hematological: Negative for adenopathy. Does not bruise/bleed easily.   Psychiatric/Behavioral: Negative for agitation, behavioral problems and confusion.       Objective:      Physical Exam   Constitutional: She appears well-developed and well-nourished.  Non-toxic appearance. No distress.   HENT:   Head: Normocephalic and atraumatic. Head is without abrasion and without laceration.   Right Ear: External ear normal.   Left Ear: External ear normal.   Nose: Nose normal.   Mouth/Throat: Oropharynx is clear and moist.   Eyes: Pupils are equal, round, and reactive to light. EOM are normal.   Neck: Trachea normal. No tracheal deviation and normal range of motion present. No thyroid mass and no thyromegaly present.   Cardiovascular: Normal rate and regular rhythm.   Pulmonary/Chest: Effort normal. No accessory muscle usage. No tachypnea. No respiratory distress. Right breast exhibits no inverted nipple, no mass and no skin change. Left breast exhibits no inverted nipple, no mass and no skin change. No breast tenderness or discharge. Breasts are symmetrical.   Abdominal: Soft. Normal appearance and bowel sounds are normal. She exhibits no distension and no mass. There is no hepatosplenomegaly. There is no tenderness. There is no tenderness at McBurney's point and negative Arriaza's sign. No hernia.   Lymphadenopathy:     She has no cervical adenopathy.     She has no axillary adenopathy.        Right: No inguinal adenopathy present.        Left: No inguinal adenopathy present.   Neurological: She is alert. Coordination and gait normal.   Skin: Skin is warm and intact.   Psychiatric: She has a normal mood and affect. Her speech is normal and behavior is normal.       Assessment/Plan:   Breast mass, right        Patient has a mildly suspicious mass in her right breast (BI-RADS 4A); ultrasound-guided biopsy is already set up for later this week.  Will await results and based  on further decision making on the biopsy results.

## 2019-10-25 ENCOUNTER — HOSPITAL ENCOUNTER (OUTPATIENT)
Dept: RADIOLOGY | Facility: HOSPITAL | Age: 49
Discharge: HOME | End: 2019-10-25
Attending: INTERNAL MEDICINE
Payer: COMMERCIAL

## 2019-10-25 DIAGNOSIS — R92.8 ABNORMAL MAMMOGRAM: ICD-10-CM

## 2019-10-25 PROCEDURE — A4550 SURGICAL TRAYS: HCPCS | Mod: PO

## 2019-10-30 ENCOUNTER — TELEPHONE (OUTPATIENT)
Dept: SURGERY | Facility: CLINIC | Age: 49
End: 2019-10-30

## 2019-10-30 NOTE — TELEPHONE ENCOUNTER
----- Message from Roni Arias MD sent at 10/29/2019 12:06 PM CDT -----  Call patient.  Path benign.  Ask patient to come in to discuss follow-up.

## 2019-11-04 ENCOUNTER — OFFICE VISIT (OUTPATIENT)
Dept: SURGERY | Facility: CLINIC | Age: 49
End: 2019-11-04
Payer: COMMERCIAL

## 2019-11-04 VITALS
TEMPERATURE: 98 F | BODY MASS INDEX: 36.7 KG/M2 | HEART RATE: 98 BPM | DIASTOLIC BLOOD PRESSURE: 82 MMHG | SYSTOLIC BLOOD PRESSURE: 122 MMHG | HEIGHT: 64 IN | WEIGHT: 215 LBS

## 2019-11-04 DIAGNOSIS — N63.10 BREAST MASS, RIGHT: Primary | ICD-10-CM

## 2019-11-04 PROCEDURE — 99213 OFFICE O/P EST LOW 20 MIN: CPT | Mod: S$GLB,,, | Performed by: SURGERY

## 2019-11-04 PROCEDURE — 99213 PR OFFICE/OUTPT VISIT, EST, LEVL III, 20-29 MIN: ICD-10-PCS | Mod: S$GLB,,, | Performed by: SURGERY

## 2019-11-04 RX ORDER — LIDOCAINE HYDROCHLORIDE 10 MG/ML
1 INJECTION, SOLUTION EPIDURAL; INFILTRATION; INTRACAUDAL; PERINEURAL ONCE
Status: DISCONTINUED | OUTPATIENT
Start: 2019-11-04 | End: 2020-11-23 | Stop reason: ALTCHOICE

## 2019-11-04 RX ORDER — SODIUM CHLORIDE 9 MG/ML
INJECTION, SOLUTION INTRAVENOUS CONTINUOUS
Status: CANCELLED | OUTPATIENT
Start: 2019-11-04

## 2019-11-04 NOTE — PROGRESS NOTES
Subjective:       Patient ID: Gi Abdullahi is a 49 y.o. female.    Chief Complaint: Follow-up (FU Right breast bx )      HPI:  Patient originally presented with a non palpable mass in the right breast which measured 4 mm.  She underwent an ultrasound-guided percutaneous biopsy which came back as benign breast tissue. After discussing with the patient options of close follow-up to make sure that we actually got the lesion versus excisional biopsy we have decided to proceed with excisional biopsy to put the issue to rest.    Past Medical History:   Diagnosis Date    Arthritis     Cervical cancer      Past Surgical History:   Procedure Laterality Date    CERVIX REMOVAL      HYSTERECTOMY      LAPAROSCOPIC CHOLECYSTECTOMY N/A 9/3/2018    Procedure: CHOLECYSTECTOMY, LAPAROSCOPIC;  Surgeon: Wilmer Degroot MD;  Location: [facility];  Service: General;  Laterality: N/A;    TONSILLECTOMY       Review of patient's allergies indicates:  No Known Allergies  Medication List with Changes/Refills   Current Medications    CHOLESTYRAMINE-ASPARTAME (CHOLESTYRAMINE LIGHT) 4 GRAM PWPK    Take 1 packet (4 g total) by mouth 2 (two) times daily. Between meals    CYANOCOBALAMIN, VITAMIN B-12, ORAL    Take by mouth once daily.    FLUOXETINE 20 MG CAPSULE    Take 3 capsules (60 mg total) by mouth once daily.    MULTIVITAMIN (THERAGRAN) TABLET    Take 1 tablet by mouth once daily.    NICOTINE (NICODERM CQ) 14 MG/24 HR    Place 1 patch onto the skin once daily.     Family History   Problem Relation Age of Onset    Hypertension Mother     Lymphoma Father      Social History     Socioeconomic History    Marital status:      Spouse name: Not on file    Number of children: Not on file    Years of education: Not on file    Highest education level: Not on file   Occupational History    Not on file   Social Needs    Financial resource strain: Not on file    Food insecurity:     Worry: Not on file     Inability: Not on file     Transportation needs:     Medical: Not on file     Non-medical: Not on file   Tobacco Use    Smoking status: Current Every Day Smoker     Packs/day: 1.00    Smokeless tobacco: Never Used   Substance and Sexual Activity    Alcohol use: No     Frequency: Never    Drug use: No    Sexual activity: Not on file   Lifestyle    Physical activity:     Days per week: Not on file     Minutes per session: Not on file    Stress: Not on file   Relationships    Social connections:     Talks on phone: Not on file     Gets together: Not on file     Attends Nondenominational service: Not on file     Active member of club or organization: Not on file     Attends meetings of clubs or organizations: Not on file     Relationship status: Not on file   Other Topics Concern    Not on file   Social History Narrative    Not on file         Review of Systems    Objective:      Physical Exam   Constitutional: She appears well-developed and well-nourished.  Non-toxic appearance. No distress.   HENT:   Head: Normocephalic and atraumatic. Head is without abrasion and without laceration.   Right Ear: External ear normal.   Left Ear: External ear normal.   Nose: Nose normal.   Mouth/Throat: Oropharynx is clear and moist.   Eyes: Pupils are equal, round, and reactive to light. EOM are normal.   Neck: Trachea normal. No tracheal deviation and normal range of motion present. No thyroid mass and no thyromegaly present.   Cardiovascular: Normal rate and regular rhythm.   Pulmonary/Chest: Effort normal. No accessory muscle usage. No tachypnea. No respiratory distress. Right breast exhibits no inverted nipple, no mass and no skin change. Left breast exhibits no inverted nipple, no mass and no skin change. No breast tenderness or discharge. Breasts are symmetrical.   Abdominal: Soft. Normal appearance and bowel sounds are normal. She exhibits no distension and no mass. There is no hepatosplenomegaly. There is no tenderness. There is no tenderness at  McBurney's point and negative Arriaza's sign. No hernia.   Genitourinary: No breast tenderness or discharge.   Lymphadenopathy:     She has no cervical adenopathy.     She has no axillary adenopathy.        Right: No inguinal adenopathy present.        Left: No inguinal adenopathy present.   Neurological: She is alert. Coordination and gait normal.   Skin: Skin is warm and intact.   Psychiatric: She has a normal mood and affect. Her speech is normal and behavior is normal.       Assessment/Plan:   Breast mass, right  -     Full code; Standing  -     Insert peripheral IV; Standing  -     Comprehensive metabolic panel; Future; Expected date: 11/04/2019  -     CBC auto differential; Future; Expected date: 11/04/2019  -     EKG 12-lead; Future  -     X-Ray Chest 1 View; Future; Expected date: 11/04/2019  -     Case Request Operating Room: BIOPSY, BREAST; xray loc  -     US Needle Loc with Ultrasound 1st site; Future; Expected date: 11/08/2019    Other orders  -     lidocaine (PF) 10 mg/ml (1%) injection 10 mg        Impression and Treatment Plan:  Right breast mass.  Possibly intraductal.  Percutaneous biopsies equivocal.  Plan operative biopsy with x-ray localization.        I discussed the proposed procedures the the patient including risks, benefits, indications, alternatives and special concerns.  The patient appears to understand and agrees to go ahead with surgery.  I have made no promises, warranties or verbal agreements beyond what was discussed above.    No follow-ups on file.

## 2019-11-04 NOTE — H&P (VIEW-ONLY)
Subjective:       Patient ID: Gi Abdullahi is a 49 y.o. female.    Chief Complaint: Follow-up (FU Right breast bx )      HPI:  Patient originally presented with a non palpable mass in the right breast which measured 4 mm.  She underwent an ultrasound-guided percutaneous biopsy which came back as benign breast tissue. After discussing with the patient options of close follow-up to make sure that we actually got the lesion versus excisional biopsy we have decided to proceed with excisional biopsy to put the issue to rest.    Past Medical History:   Diagnosis Date    Arthritis     Cervical cancer      Past Surgical History:   Procedure Laterality Date    CERVIX REMOVAL      HYSTERECTOMY      LAPAROSCOPIC CHOLECYSTECTOMY N/A 9/3/2018    Procedure: CHOLECYSTECTOMY, LAPAROSCOPIC;  Surgeon: Wilmer Degroot MD;  Location: [facility];  Service: General;  Laterality: N/A;    TONSILLECTOMY       Review of patient's allergies indicates:  No Known Allergies  Medication List with Changes/Refills   Current Medications    CHOLESTYRAMINE-ASPARTAME (CHOLESTYRAMINE LIGHT) 4 GRAM PWPK    Take 1 packet (4 g total) by mouth 2 (two) times daily. Between meals    CYANOCOBALAMIN, VITAMIN B-12, ORAL    Take by mouth once daily.    FLUOXETINE 20 MG CAPSULE    Take 3 capsules (60 mg total) by mouth once daily.    MULTIVITAMIN (THERAGRAN) TABLET    Take 1 tablet by mouth once daily.    NICOTINE (NICODERM CQ) 14 MG/24 HR    Place 1 patch onto the skin once daily.     Family History   Problem Relation Age of Onset    Hypertension Mother     Lymphoma Father      Social History     Socioeconomic History    Marital status:      Spouse name: Not on file    Number of children: Not on file    Years of education: Not on file    Highest education level: Not on file   Occupational History    Not on file   Social Needs    Financial resource strain: Not on file    Food insecurity:     Worry: Not on file     Inability: Not on file     Transportation needs:     Medical: Not on file     Non-medical: Not on file   Tobacco Use    Smoking status: Current Every Day Smoker     Packs/day: 1.00    Smokeless tobacco: Never Used   Substance and Sexual Activity    Alcohol use: No     Frequency: Never    Drug use: No    Sexual activity: Not on file   Lifestyle    Physical activity:     Days per week: Not on file     Minutes per session: Not on file    Stress: Not on file   Relationships    Social connections:     Talks on phone: Not on file     Gets together: Not on file     Attends Quaker service: Not on file     Active member of club or organization: Not on file     Attends meetings of clubs or organizations: Not on file     Relationship status: Not on file   Other Topics Concern    Not on file   Social History Narrative    Not on file         Review of Systems    Objective:      Physical Exam   Constitutional: She appears well-developed and well-nourished.  Non-toxic appearance. No distress.   HENT:   Head: Normocephalic and atraumatic. Head is without abrasion and without laceration.   Right Ear: External ear normal.   Left Ear: External ear normal.   Nose: Nose normal.   Mouth/Throat: Oropharynx is clear and moist.   Eyes: Pupils are equal, round, and reactive to light. EOM are normal.   Neck: Trachea normal. No tracheal deviation and normal range of motion present. No thyroid mass and no thyromegaly present.   Cardiovascular: Normal rate and regular rhythm.   Pulmonary/Chest: Effort normal. No accessory muscle usage. No tachypnea. No respiratory distress. Right breast exhibits no inverted nipple, no mass and no skin change. Left breast exhibits no inverted nipple, no mass and no skin change. No breast tenderness or discharge. Breasts are symmetrical.   Abdominal: Soft. Normal appearance and bowel sounds are normal. She exhibits no distension and no mass. There is no hepatosplenomegaly. There is no tenderness. There is no tenderness at  McBurney's point and negative Arriaza's sign. No hernia.   Genitourinary: No breast tenderness or discharge.   Lymphadenopathy:     She has no cervical adenopathy.     She has no axillary adenopathy.        Right: No inguinal adenopathy present.        Left: No inguinal adenopathy present.   Neurological: She is alert. Coordination and gait normal.   Skin: Skin is warm and intact.   Psychiatric: She has a normal mood and affect. Her speech is normal and behavior is normal.       Assessment/Plan:   Breast mass, right  -     Full code; Standing  -     Insert peripheral IV; Standing  -     Comprehensive metabolic panel; Future; Expected date: 11/04/2019  -     CBC auto differential; Future; Expected date: 11/04/2019  -     EKG 12-lead; Future  -     X-Ray Chest 1 View; Future; Expected date: 11/04/2019  -     Case Request Operating Room: BIOPSY, BREAST; xray loc  -     US Needle Loc with Ultrasound 1st site; Future; Expected date: 11/08/2019    Other orders  -     lidocaine (PF) 10 mg/ml (1%) injection 10 mg        Impression and Treatment Plan:  Right breast mass.  Possibly intraductal.  Percutaneous biopsies equivocal.  Plan operative biopsy with x-ray localization.        I discussed the proposed procedures the the patient including risks, benefits, indications, alternatives and special concerns.  The patient appears to understand and agrees to go ahead with surgery.  I have made no promises, warranties or verbal agreements beyond what was discussed above.    No follow-ups on file.

## 2019-11-06 ENCOUNTER — HOSPITAL ENCOUNTER (OUTPATIENT)
Dept: RADIOLOGY | Facility: HOSPITAL | Age: 49
Discharge: HOME | End: 2019-11-06
Attending: SURGERY
Payer: COMMERCIAL

## 2019-11-06 ENCOUNTER — HOSPITAL ENCOUNTER (OUTPATIENT)
Dept: PREADMISSION TESTING | Facility: HOSPITAL | Age: 49
Discharge: HOME | End: 2019-11-06
Attending: SURGERY
Payer: COMMERCIAL

## 2019-11-06 ENCOUNTER — LAB VISIT (OUTPATIENT)
Dept: LAB | Facility: HOSPITAL | Age: 49
End: 2019-11-06
Attending: SURGERY
Payer: COMMERCIAL

## 2019-11-06 VITALS
HEART RATE: 83 BPM | SYSTOLIC BLOOD PRESSURE: 102 MMHG | DIASTOLIC BLOOD PRESSURE: 72 MMHG | RESPIRATION RATE: 16 BRPM | BODY MASS INDEX: 35.93 KG/M2 | HEIGHT: 66 IN | TEMPERATURE: 98 F | WEIGHT: 223.56 LBS | OXYGEN SATURATION: 96 %

## 2019-11-06 DIAGNOSIS — N63.10 BREAST MASS, RIGHT: ICD-10-CM

## 2019-11-06 LAB
ALBUMIN SERPL BCP-MCNC: 3.6 G/DL (ref 3.5–5.2)
ALP SERPL-CCNC: 108 U/L (ref 55–135)
ALT SERPL W/O P-5'-P-CCNC: 90 U/L (ref 10–44)
ANION GAP SERPL CALC-SCNC: 12 MMOL/L (ref 8–16)
AST SERPL-CCNC: 35 U/L (ref 10–40)
BASOPHILS # BLD AUTO: 0.07 K/UL (ref 0–0.2)
BASOPHILS NFR BLD: 0.8 % (ref 0–1.9)
BILIRUB SERPL-MCNC: 0.5 MG/DL (ref 0.1–1)
BUN SERPL-MCNC: 11 MG/DL (ref 6–20)
CALCIUM SERPL-MCNC: 9 MG/DL (ref 8.7–10.5)
CHLORIDE SERPL-SCNC: 99 MMOL/L (ref 95–110)
CO2 SERPL-SCNC: 27 MMOL/L (ref 23–29)
CREAT SERPL-MCNC: 0.7 MG/DL (ref 0.5–1.4)
DIFFERENTIAL METHOD: ABNORMAL
EOSINOPHIL # BLD AUTO: 0.2 K/UL (ref 0–0.5)
EOSINOPHIL NFR BLD: 2.5 % (ref 0–8)
ERYTHROCYTE [DISTWIDTH] IN BLOOD BY AUTOMATED COUNT: 13.8 % (ref 11.5–14.5)
EST. GFR  (AFRICAN AMERICAN): >60 ML/MIN/1.73 M^2
EST. GFR  (NON AFRICAN AMERICAN): >60 ML/MIN/1.73 M^2
GLUCOSE SERPL-MCNC: 89 MG/DL (ref 70–110)
HCT VFR BLD AUTO: 41.5 % (ref 37–48.5)
HGB BLD-MCNC: 13.5 G/DL (ref 12–16)
IMM GRANULOCYTES # BLD AUTO: 0.02 K/UL (ref 0–0.04)
IMM GRANULOCYTES NFR BLD AUTO: 0.2 % (ref 0–0.5)
LYMPHOCYTES # BLD AUTO: 3.1 K/UL (ref 1–4.8)
LYMPHOCYTES NFR BLD: 35.1 % (ref 18–48)
MCH RBC QN AUTO: 30.1 PG (ref 27–31)
MCHC RBC AUTO-ENTMCNC: 32.5 G/DL (ref 32–36)
MCV RBC AUTO: 92 FL (ref 82–98)
MONOCYTES # BLD AUTO: 0.7 K/UL (ref 0.3–1)
MONOCYTES NFR BLD: 7.5 % (ref 4–15)
NEUTROPHILS # BLD AUTO: 4.8 K/UL (ref 1.8–7.7)
NEUTROPHILS NFR BLD: 53.9 % (ref 38–73)
NRBC BLD-RTO: 0 /100 WBC
PLATELET # BLD AUTO: 405 K/UL (ref 150–350)
PMV BLD AUTO: 10 FL (ref 9.2–12.9)
POTASSIUM SERPL-SCNC: 3.8 MMOL/L (ref 3.5–5.1)
PROT SERPL-MCNC: 7.4 G/DL (ref 6–8.4)
RBC # BLD AUTO: 4.49 M/UL (ref 4–5.4)
SODIUM SERPL-SCNC: 138 MMOL/L (ref 136–145)
WBC # BLD AUTO: 8.84 K/UL (ref 3.9–12.7)

## 2019-11-06 PROCEDURE — 36415 COLL VENOUS BLD VENIPUNCTURE: CPT

## 2019-11-06 PROCEDURE — 93005 ELECTROCARDIOGRAM TRACING: CPT

## 2019-11-06 PROCEDURE — 85025 COMPLETE CBC W/AUTO DIFF WBC: CPT

## 2019-11-06 PROCEDURE — 80053 COMPREHEN METABOLIC PANEL: CPT

## 2019-11-06 PROCEDURE — 71046 X-RAY EXAM CHEST 2 VIEWS: CPT | Mod: TC

## 2019-11-06 RX ORDER — BUSPIRONE HYDROCHLORIDE 15 MG/1
15 TABLET ORAL 2 TIMES DAILY
COMMUNITY
End: 2020-10-01

## 2019-11-06 RX ORDER — TRAZODONE HYDROCHLORIDE 100 MG/1
100 TABLET ORAL NIGHTLY
COMMUNITY
End: 2020-10-01

## 2019-11-06 NOTE — DISCHARGE INSTRUCTIONS
To confirm, Your doctor has instructed you that surgery is scheduled for: Friday, November 8, 2019    Pre-Op will call the afternoon prior to surgery between 4:00 and 6:00 PM with the final arrival time. Thursday, November 7, 2019      Please report to [facility] on [address] the morning of surgery.     Do not eat or drink anything after midnight the night before your surgery - THIS INCLUDES  WATER, GUM, MINTS AND CANDY.  YOU MAY BRUSH YOUR TEETH BUT DO NOT SWALLOW     TAKE ONLY THESE MEDICATIONS WITH A SMALL SIP OF WATER THE MORNING OF YOUR PROCEDURE:      PLEASE NOTE:  The surgery schedule has many variables which may affect the time of your surgery case.  Family members should be available if your surgery time changes.  Plan to be here the day of your procedure between 4-6 hours.      DO NOT TAKE THESE MEDICATIONS 5-7 DAYS PRIOR to your procedure or per your surgeon's request: ASPIRIN, ALEVE, ADVIL, IBUPROFEN,  CORY SELTZER, BC , FISH OIL , VITAMIN E, HERBALS  (May take Tylenol)      ONLY if you are prescribed any types of blood thinners such as:  Aspirin, Coumadin, Plavix, Pradaxa, Xarelto, Aggrenox, Effient, Eliquis, Savasya, Brilinta, or any other, ask your surgeon whether you should stop taking them and how long before surgery you should stop.  You may also need to verify with the prescribing physician if it is ok to stop your medication.                                                        IMPORTANT INSTRUCTIONS      · Do not smoke, vape or drink alcoholic beverages 24 hours prior to your procedure.  · Shower the night before AND the morning of your procedure with a Chlorhexidine wash such as Hibiclens or Dial antibacterial soap from the neck down.   ·  Do not get it on your face or in your eyes.  You may use your own shampoo and face wash. This helps your skin to be as bacteria free as possible.   ·  DO NOT remove hair from the surgery site.  Do not shave the incision site unless you are given  specific instructions to do so.    · Sleep in a bed with clean sheets.  Do not sleep with a pet in the bed.   · If you wear contact lenses, dentures, hearing aids or glasses, bring a container to put them in during surgery and give to a family member for safe keeping.    · Please leave all jewelry, piercing's and valuables at home.     · Make arrangements in advance for transportation home by a responsible adult.      · You must make arrangements for transportation, TAXI'S, UBER'S OR LYFTS ARE NOT ALLOWED.        If you have any questions about these instructions, call (Monday - Friday) Pre-Op Admit  Nursing  at [phone] or the Pre-Op Day Surgery Unit at [phone].

## 2019-11-08 ENCOUNTER — ANESTHESIA (OUTPATIENT)
Dept: SURGERY | Facility: HOSPITAL | Age: 49
End: 2019-11-08
Payer: COMMERCIAL

## 2019-11-08 ENCOUNTER — HOSPITAL ENCOUNTER (OUTPATIENT)
Facility: HOSPITAL | Age: 49
Discharge: HOME | End: 2019-11-08
Attending: SURGERY | Admitting: SURGERY
Payer: COMMERCIAL

## 2019-11-08 ENCOUNTER — ANESTHESIA EVENT (OUTPATIENT)
Dept: SURGERY | Facility: HOSPITAL | Age: 49
End: 2019-11-08
Payer: COMMERCIAL

## 2019-11-08 VITALS
WEIGHT: 223.56 LBS | SYSTOLIC BLOOD PRESSURE: 108 MMHG | HEIGHT: 66 IN | HEART RATE: 64 BPM | BODY MASS INDEX: 35.93 KG/M2 | RESPIRATION RATE: 16 BRPM | TEMPERATURE: 98 F | DIASTOLIC BLOOD PRESSURE: 65 MMHG | OXYGEN SATURATION: 98 %

## 2019-11-08 DIAGNOSIS — N63.10 BREAST MASS, RIGHT: ICD-10-CM

## 2019-11-08 PROCEDURE — 19125 EXCISION BREAST LESION: CPT | Mod: RT,,, | Performed by: SURGERY

## 2019-11-08 PROCEDURE — 63600175 PHARM REV CODE 636 W HCPCS: Performed by: SURGERY

## 2019-11-08 PROCEDURE — 63600175 PHARM REV CODE 636 W HCPCS: Performed by: STUDENT IN AN ORGANIZED HEALTH CARE EDUCATION/TRAINING PROGRAM

## 2019-11-08 PROCEDURE — 19125 PR EXCISE BREAST LES W XRAY MARKER: ICD-10-PCS | Mod: RT,,, | Performed by: SURGERY

## 2019-11-08 PROCEDURE — 27000650 HC AIRWAY EXCHANGE: Performed by: STUDENT IN AN ORGANIZED HEALTH CARE EDUCATION/TRAINING PROGRAM

## 2019-11-08 PROCEDURE — 27000671 HC TUBING MICROBORE EXT: Performed by: STUDENT IN AN ORGANIZED HEALTH CARE EDUCATION/TRAINING PROGRAM

## 2019-11-08 PROCEDURE — 37000009 HC ANESTHESIA EA ADD 15 MINS: Performed by: SURGERY

## 2019-11-08 PROCEDURE — 36000706: Performed by: SURGERY

## 2019-11-08 PROCEDURE — 27201423 OPTIME MED/SURG SUP & DEVICES STERILE SUPPLY: Performed by: SURGERY

## 2019-11-08 PROCEDURE — 25000003 PHARM REV CODE 250: Performed by: SURGERY

## 2019-11-08 PROCEDURE — 27201107 HC STYLET, STANDARD: Performed by: STUDENT IN AN ORGANIZED HEALTH CARE EDUCATION/TRAINING PROGRAM

## 2019-11-08 PROCEDURE — 71000033 HC RECOVERY, INTIAL HOUR: Performed by: SURGERY

## 2019-11-08 PROCEDURE — 63600175 PHARM REV CODE 636 W HCPCS: Performed by: NURSE ANESTHETIST, CERTIFIED REGISTERED

## 2019-11-08 PROCEDURE — 37000008 HC ANESTHESIA 1ST 15 MINUTES: Performed by: SURGERY

## 2019-11-08 PROCEDURE — 25000003 PHARM REV CODE 250: Performed by: STUDENT IN AN ORGANIZED HEALTH CARE EDUCATION/TRAINING PROGRAM

## 2019-11-08 PROCEDURE — 27202105 HC BIS BILATERAL SENSOR: Performed by: STUDENT IN AN ORGANIZED HEALTH CARE EDUCATION/TRAINING PROGRAM

## 2019-11-08 PROCEDURE — 36000707: Performed by: SURGERY

## 2019-11-08 PROCEDURE — 25000003 PHARM REV CODE 250: Performed by: NURSE ANESTHETIST, CERTIFIED REGISTERED

## 2019-11-08 PROCEDURE — 27000673 HC TUBING BLOOD Y: Performed by: STUDENT IN AN ORGANIZED HEALTH CARE EDUCATION/TRAINING PROGRAM

## 2019-11-08 PROCEDURE — 71000015 HC POSTOP RECOV 1ST HR: Performed by: SURGERY

## 2019-11-08 PROCEDURE — 27000653 HC CATH, IV CATHLN: Performed by: STUDENT IN AN ORGANIZED HEALTH CARE EDUCATION/TRAINING PROGRAM

## 2019-11-08 RX ORDER — HYDROMORPHONE HYDROCHLORIDE 1 MG/ML
0.2 INJECTION, SOLUTION INTRAMUSCULAR; INTRAVENOUS; SUBCUTANEOUS
Status: DISCONTINUED | OUTPATIENT
Start: 2019-11-08 | End: 2019-11-08 | Stop reason: HOSPADM

## 2019-11-08 RX ORDER — CELECOXIB 100 MG/1
200 CAPSULE ORAL ONCE
Status: COMPLETED | OUTPATIENT
Start: 2019-11-08 | End: 2019-11-08

## 2019-11-08 RX ORDER — DEXAMETHASONE SODIUM PHOSPHATE 4 MG/ML
INJECTION, SOLUTION INTRA-ARTICULAR; INTRALESIONAL; INTRAMUSCULAR; INTRAVENOUS; SOFT TISSUE
Status: DISCONTINUED | OUTPATIENT
Start: 2019-11-08 | End: 2019-11-08

## 2019-11-08 RX ORDER — SODIUM CHLORIDE, SODIUM LACTATE, POTASSIUM CHLORIDE, CALCIUM CHLORIDE 600; 310; 30; 20 MG/100ML; MG/100ML; MG/100ML; MG/100ML
INJECTION, SOLUTION INTRAVENOUS CONTINUOUS PRN
Status: DISCONTINUED | OUTPATIENT
Start: 2019-11-08 | End: 2019-11-08

## 2019-11-08 RX ORDER — ACETAMINOPHEN 500 MG
1000 TABLET ORAL ONCE
Status: COMPLETED | OUTPATIENT
Start: 2019-11-08 | End: 2019-11-08

## 2019-11-08 RX ORDER — BUPIVACAINE HYDROCHLORIDE AND EPINEPHRINE 2.5; 5 MG/ML; UG/ML
INJECTION, SOLUTION EPIDURAL; INFILTRATION; INTRACAUDAL; PERINEURAL
Status: DISCONTINUED | OUTPATIENT
Start: 2019-11-08 | End: 2019-11-08 | Stop reason: HOSPADM

## 2019-11-08 RX ORDER — MEPERIDINE HYDROCHLORIDE 50 MG/ML
12.5 INJECTION INTRAMUSCULAR; INTRAVENOUS; SUBCUTANEOUS EVERY 10 MIN PRN
Status: DISCONTINUED | OUTPATIENT
Start: 2019-11-08 | End: 2019-11-08 | Stop reason: HOSPADM

## 2019-11-08 RX ORDER — SUCCINYLCHOLINE CHLORIDE 20 MG/ML
INJECTION INTRAMUSCULAR; INTRAVENOUS
Status: DISCONTINUED | OUTPATIENT
Start: 2019-11-08 | End: 2019-11-08

## 2019-11-08 RX ORDER — GABAPENTIN 300 MG/1
300 CAPSULE ORAL ONCE
Status: COMPLETED | OUTPATIENT
Start: 2019-11-08 | End: 2019-11-08

## 2019-11-08 RX ORDER — HYDROCODONE BITARTRATE AND ACETAMINOPHEN 7.5; 325 MG/1; MG/1
1 TABLET ORAL EVERY 6 HOURS PRN
Qty: 30 TABLET | Refills: 0
Start: 2019-11-08 | End: 2019-11-18

## 2019-11-08 RX ORDER — SODIUM CHLORIDE 0.9 % (FLUSH) 0.9 %
10 SYRINGE (ML) INJECTION
Status: DISCONTINUED | OUTPATIENT
Start: 2019-11-08 | End: 2019-11-08 | Stop reason: HOSPADM

## 2019-11-08 RX ORDER — MIDAZOLAM HYDROCHLORIDE 1 MG/ML
INJECTION INTRAMUSCULAR; INTRAVENOUS
Status: DISCONTINUED | OUTPATIENT
Start: 2019-11-08 | End: 2019-11-08

## 2019-11-08 RX ORDER — ONDANSETRON 2 MG/ML
4 INJECTION INTRAMUSCULAR; INTRAVENOUS DAILY PRN
Status: DISCONTINUED | OUTPATIENT
Start: 2019-11-08 | End: 2019-11-08 | Stop reason: HOSPADM

## 2019-11-08 RX ORDER — CEFAZOLIN SODIUM 2 G/50ML
2 SOLUTION INTRAVENOUS
Status: COMPLETED | OUTPATIENT
Start: 2019-11-08 | End: 2019-11-08

## 2019-11-08 RX ORDER — PROPOFOL 10 MG/ML
VIAL (ML) INTRAVENOUS
Status: DISCONTINUED | OUTPATIENT
Start: 2019-11-08 | End: 2019-11-08

## 2019-11-08 RX ORDER — DIPHENHYDRAMINE HYDROCHLORIDE 50 MG/ML
25 INJECTION INTRAMUSCULAR; INTRAVENOUS EVERY 6 HOURS PRN
Status: DISCONTINUED | OUTPATIENT
Start: 2019-11-08 | End: 2019-11-08 | Stop reason: HOSPADM

## 2019-11-08 RX ORDER — OXYCODONE HYDROCHLORIDE 5 MG/1
5 TABLET ORAL
Status: DISCONTINUED | OUTPATIENT
Start: 2019-11-08 | End: 2019-11-08 | Stop reason: HOSPADM

## 2019-11-08 RX ORDER — SODIUM CHLORIDE 9 MG/ML
INJECTION, SOLUTION INTRAVENOUS CONTINUOUS
Status: DISCONTINUED | OUTPATIENT
Start: 2019-11-08 | End: 2019-11-08 | Stop reason: HOSPADM

## 2019-11-08 RX ORDER — LIDOCAINE HYDROCHLORIDE 20 MG/ML
INJECTION, SOLUTION EPIDURAL; INFILTRATION; INTRACAUDAL; PERINEURAL
Status: DISCONTINUED | OUTPATIENT
Start: 2019-11-08 | End: 2019-11-08

## 2019-11-08 RX ORDER — ROCURONIUM BROMIDE 10 MG/ML
INJECTION, SOLUTION INTRAVENOUS
Status: DISCONTINUED | OUTPATIENT
Start: 2019-11-08 | End: 2019-11-08

## 2019-11-08 RX ADMIN — CELECOXIB 200 MG: 100 CAPSULE ORAL at 12:11

## 2019-11-08 RX ADMIN — DEXAMETHASONE SODIUM PHOSPHATE 4 MG: 4 INJECTION, SOLUTION INTRAMUSCULAR; INTRAVENOUS at 11:11

## 2019-11-08 RX ADMIN — MIDAZOLAM HYDROCHLORIDE 2 MG: 1 INJECTION, SOLUTION INTRAMUSCULAR; INTRAVENOUS at 11:11

## 2019-11-08 RX ADMIN — GABAPENTIN 300 MG: 300 CAPSULE ORAL at 11:11

## 2019-11-08 RX ADMIN — ONDANSETRON 4 MG: 2 INJECTION INTRAMUSCULAR; INTRAVENOUS at 11:11

## 2019-11-08 RX ADMIN — ROCURONIUM BROMIDE 5 MG: 10 INJECTION, SOLUTION INTRAVENOUS at 11:11

## 2019-11-08 RX ADMIN — SUCCINYLCHOLINE CHLORIDE 120 MG: 20 INJECTION, SOLUTION INTRAMUSCULAR; INTRAVENOUS at 11:11

## 2019-11-08 RX ADMIN — LIDOCAINE HYDROCHLORIDE 100 MG: 20 INJECTION, SOLUTION EPIDURAL; INFILTRATION; INTRACAUDAL; PERINEURAL at 11:11

## 2019-11-08 RX ADMIN — CEFAZOLIN SODIUM 2 G: 2 SOLUTION INTRAVENOUS at 11:11

## 2019-11-08 RX ADMIN — SODIUM CHLORIDE, SODIUM LACTATE, POTASSIUM CHLORIDE, AND CALCIUM CHLORIDE: .6; .31; .03; .02 INJECTION, SOLUTION INTRAVENOUS at 11:11

## 2019-11-08 RX ADMIN — ACETAMINOPHEN 1000 MG: 500 TABLET, FILM COATED ORAL at 11:11

## 2019-11-08 RX ADMIN — PROPOFOL 90 MG: 10 INJECTION, EMULSION INTRAVENOUS at 11:11

## 2019-11-08 NOTE — INTERVAL H&P NOTE
The patient has been examined and the H&P has been reviewed:    I concur with the findings and no changes have occurred since H&P was written.    Anesthesia/Surgery risks, benefits and alternative options discussed and understood by patient/family.          Active Hospital Problems    Diagnosis  POA    Breast mass, right [N63.10]  Yes      Resolved Hospital Problems   No resolved problems to display.

## 2019-11-08 NOTE — DISCHARGE INSTRUCTIONS
Surgical Breast Biopsy: Your Experience     Be sure to tell your healthcare provider about all medicines you take.     A surgical breast biopsy is done to remove a small piece of tissue from the breast. This tissue is then sent to a lab to be studied. Most surgical breast biopsies are done in a hospital or clinic. They are performed on an outpatient basis.  Understanding the risks  Risks that may happen with surgical biopsy include:  · Excessive bleeding or bruising  · Infection  · Problems from the anesthesia  · Poor wound healing  · Change in breast shape  · Failure to remove entire lesion  · False-negative result  Before the biopsy  Tell your surgeon about any medicines, vitamins, supplements, or herbs you take. This includes over-the-counter medicines, such as aspirin. Some of these may affect your bodys response during surgery. On the day of the biopsy, wear a loose shirt that buttons in front. Also, be sure to arrange for a trusted adult to drive you home.  After the biopsy  Usually you can go home the day of the biopsy. You may have bruising and swelling for a few days. If you need them, your surgeon may prescribe pain medicines. Ice packs can also help ease minor soreness or swelling. Be sure you know what type of pain medicine you can take if you need it. Leave your dressing on for as long as your surgeon suggests. Also, follow your surgeons advice about bathing and exercise.  · Don't do any physical work or strenuous activity for the first 24 hours after the procedure. You can usually return to your normal routine after this brief period of rest.  · Ask how long you should use a waterproof ice pack over the biopsy area, when your bandage can be taken off, and when you can take medicine, including aspirin, again.  · You may have a bruise for 1 to 2 weeks after the procedure. This is normal. You may also have a tiny scar. Ask about wearing a supportive bra to help with discomfort.  · If you have fever,  excessive bleeding, swelling, or other problems, call your healthcare provider.  · Ask your healthcare provider when you will get the results of the biopsy and who will explain them to you.  When to call your surgeon  Call your surgeon if you have any of these:  · A fever over 100.4°F (38°C)  · Increased pain, warmth, or redness at the puncture or incision site  · Severe swelling that doesnt go away in a few days  · Drainage from the puncture or incision site  · Bleeding that soaks through the dressing  Know how to reach your healthcare provider if you have problems or concerns. Be sure you know how to get help after office hours, on weekends, and on holidays, too.    Date Last Reviewed: 10/31/2015  © 1131-1955 [employer]. [address] [place] [address], [place] [address]. All rights reserved. This information is not intended as a substitute for professional medical care. Always follow your healthcare professional's instructions.        Discharge Instructions: After Your Surgery  Youve just had surgery. During surgery, you were given medicine called anesthesia to keep you relaxed and free of pain. After surgery, you may have some pain or nausea. This is common. Here are some tips for feeling better and getting well after surgery.     Stay on schedule with your medicine.   Going home  Your healthcare provider will show you how to take care of yourself when you go home. He or she will also answer your questions. Have an adult family member or friend drive you home. For the first 24 hours after your surgery:  · Do not drive or use heavy equipment.  · Do not make important decisions or sign legal papers.  · Do not drink alcohol.  · Have someone stay with you, if needed. He or she can watch for problems and help keep you safe.  Be sure to go to all follow-up visits with your healthcare provider. And rest after your surgery for as long as your healthcare provider tells you to.  Coping with pain  If you have pain  after surgery, pain medicine will help you feel better. Take it as told, before pain becomes severe. Also, ask your healthcare provider or pharmacist about other ways to control pain. This might be with heat, ice, or relaxation. And follow any other instructions your surgeon or nurse gives you.  Tips for taking pain medicine  To get the best relief possible, remember these points:  · Pain medicines can upset your stomach. Taking them with a little food may help.  · Most pain relievers taken by mouth need at least 20 to 30 minutes to start to work.  · Taking medicine on a schedule can help you remember to take it. Try to time your medicine so that you can take it before starting an activity. This might be before you get dressed, go for a walk, or sit down for dinner.  · Constipation is a common side effect of pain medicines. Call your healthcare provider before taking any medicines such as laxatives or stool softeners to help ease constipation. Also ask if you should skip any foods. Drinking lots of fluids and eating foods such as fruits and vegetables that are high in fiber can also help. Remember, do not take laxatives unless your surgeon has prescribed them.  · Drinking alcohol and taking pain medicine can cause dizziness and slow your breathing. It can even be deadly. Do not drink alcohol while taking pain medicine.  · Pain medicine can make you react more slowly to things. Do not drive or run machinery while taking pain medicine.  Your healthcare provider may tell you to take acetaminophen to help ease your pain. Ask him or her how much you are supposed to take each day. Acetaminophen or other pain relievers may interact with your prescription medicines or other over-the-counter (OTC) medicines. Some prescription medicines have acetaminophen and other ingredients. Using both prescription and OTC acetaminophen for pain can cause you to overdose. Read the labels on your OTC medicines with care. This will help  you to clearly know the list of ingredients, how much to take, and any warnings. It may also help you not take too much acetaminophen. If you have questions or do not understand the information, ask your pharmacist or healthcare provider to explain it to you before you take the OTC medicine.  Managing nausea  Some people have an upset stomach after surgery. This is often because of anesthesia, pain, or pain medicine, or the stress of surgery. These tips will help you handle nausea and eat healthy foods as you get better. If you were on a special food plan before surgery, ask your healthcare provider if you should follow it while you get better. These tips may help:  · Do not push yourself to eat. Your body will tell you when to eat and how much.  · Start off with clear liquids and soup. They are easier to digest.  · Next try semi-solid foods, such as mashed potatoes, applesauce, and gelatin, as you feel ready.  · Slowly move to solid foods. Dont eat fatty, rich, or spicy foods at first.  · Do not force yourself to have 3 large meals a day. Instead eat smaller amounts more often.  · Take pain medicines with a small amount of solid food, such as crackers or toast, to avoid nausea.     Call your surgeon if  · You still have pain an hour after taking medicine. The medicine may not be strong enough.  · You feel too sleepy, dizzy, or groggy. The medicine may be too strong.  · You have side effects like nausea, vomiting, or skin changes, such as rash, itching, or hives.       If you have obstructive sleep apnea  You were given anesthesia medicine during surgery to keep you comfortable and free of pain. After surgery, you may have more apnea spells because of this medicine and other medicines you were given. The spells may last longer than usual.   At home:  · Keep using the continuous positive airway pressure (CPAP) device when you sleep. Unless your healthcare provider tells you not to, use it when you sleep, day or  night. CPAP is a common device used to treat obstructive sleep apnea.  · Talk with your provider before taking any pain medicine, muscle relaxants, or sedatives. Your provider will tell you about the possible dangers of taking these medicines.  Date Last Reviewed: 12/1/2016 © 2000-2017 [employer]. [address] [place] [address], [place] [address]. All rights reserved. This information is not intended as a substitute for professional medical care. Always follow your healthcare professional's instructions.

## 2019-11-08 NOTE — ANESTHESIA PREPROCEDURE EVALUATION
11/08/2019  Gi Abdullahi is a 49 y.o., female   Patient Active Problem List   Diagnosis    Mild episode of recurrent major depressive disorder    Calculus of gallbladder with acute cholecystitis without obstruction    Nicotine dependence with current use    Morbidly obese    Hypophosphatemia    Breast mass, right       Past Surgical History:   Procedure Laterality Date    CERVIX REMOVAL      HEMORRHOID SURGERY  2018    Dr Quintero    HYSTERECTOMY      LAPAROSCOPIC CHOLECYSTECTOMY N/A 9/3/2018    Procedure: CHOLECYSTECTOMY, LAPAROSCOPIC;  Surgeon: Wilmer Degroot MD;  Location: [facility];  Service: General;  Laterality: N/A;        Tobacco Use:  The patient  reports that she has been smoking. She has a 30.00 pack-year smoking history. She has never used smokeless tobacco.     Results for orders placed or performed during the hospital encounter of 11/06/19   EKG 12-lead    Collection Time: 11/06/19  2:04 PM    Narrative    Test Reason : N63.10,    Vent. Rate : 083 BPM     Atrial Rate : 083 BPM     P-R Int : 162 ms          QRS Dur : 082 ms      QT Int : 376 ms       P-R-T Axes : 032 014 026 degrees     QTc Int : 441 ms    Normal sinus rhythm  Normal ECG  When compared with ECG of 02-SEP-2018 21:13,  No significant change was found    Referred By:  CLINT           Confirmed By:              Lab Results   Component Value Date    WBC 8.84 11/06/2019    HGB 13.5 11/06/2019    HCT 41.5 11/06/2019    MCV 92 11/06/2019     (H) 11/06/2019     BMP  Lab Results   Component Value Date     11/06/2019    K 3.8 11/06/2019    CL 99 11/06/2019    CO2 27 11/06/2019    BUN 11 11/06/2019    CREATININE 0.7 11/06/2019    CALCIUM 9.0 11/06/2019    ANIONGAP 12 11/06/2019    ESTGFRAFRICA >60.0 11/06/2019    EGFRNONAA >60.0 11/06/2019             Pre-op Assessment    I have reviewed the Patient Summary  Reports.    I have reviewed the Nursing Notes.   I have reviewed the Medications.     Review of Systems  Anesthesia Hx:  No problems with previous Anesthesia  Denies Family Hx of Anesthesia complications.   Denies Personal Hx of Anesthesia complications.   Social:  Smoker, Social Alcohol Use    Hematology/Oncology:  Hematology Normal       -- Cancer in past history:  Other (see Oncology comments) Oncology Comments: Cervical cancer, treated with surgery, no chemo     EENT/Dental:EENT/Dental Normal   Cardiovascular:  Cardiovascular Normal Exercise tolerance: good   Functional Capacity good / => 4 METS    Pulmonary:  Pulmonary Normal    Hepatic/GI:  Hepatic/GI Normal    Musculoskeletal:   Arthritis     Neurological:  Neurology Normal    Endocrine:  Endocrine Normal    Psych:   depression          Physical Exam  General:  Well nourished    Airway/Jaw/Neck:  Airway Findings: Mouth Opening: Normal Mallampati: II  TM Distance: Normal, at least 6 cm  Jaw/Neck Findings:  Neck ROM: Normal ROM      Dental:  Dental Findings: In tact   Chest/Lungs:  Chest/Lungs Findings: Clear to auscultation, Normal Respiratory Rate     Heart/Vascular:  Heart Findings: Rate: Normal  Rhythm: Regular Rhythm  Sounds: Normal        Mental Status:  Mental Status Findings:  Cooperative, Alert and Oriented         Anesthesia Plan  Type of Anesthesia, risks & benefits discussed:  Anesthesia Type:  general  Patient's Preference:   Intra-op Monitoring Plan: standard ASA monitors  Intra-op Monitoring Plan Comments:   Post Op Pain Control Plan: multimodal analgesia  Post Op Pain Control Plan Comments:   Induction:   IV  Beta Blocker:         Informed Consent: Patient understands risks and agrees with Anesthesia plan.  Questions answered. Anesthesia consent signed with patient.  ASA Score: 2     Day of Surgery Review of History & Physical:  There are no significant changes.      Anesthesia Plan Notes: General, standard ASA monitors. Lidocaine  LTA.  Multimodal: Acetaminophen 1000mg PO, gabapentin 300mg, celebrex 200mg  PONV prophylaxis: zofran 4mg, decadron 8mg

## 2019-11-08 NOTE — ANESTHESIA POSTPROCEDURE EVALUATION
Anesthesia Post Evaluation    Patient: Gi Abdullahi    Procedure(s) Performed: Procedure(s) (LRB):  BIOPSY, BREAST; xray loc US LOC @11A (Right)    Final Anesthesia Type: general  Patient location during evaluation: PACU  Patient participation: Yes- Able to Participate  Level of consciousness: awake and alert and oriented  Post-procedure vital signs: reviewed and stable  Pain management: adequate  Airway patency: patent  PONV status at discharge: No PONV  Anesthetic complications: no      Cardiovascular status: blood pressure returned to baseline and hemodynamically stable  Respiratory status: unassisted, spontaneous ventilation and room air  Hydration status: euvolemic  Follow-up not needed.          Vitals Value Taken Time   /59 11/8/2019 12:30 PM   Temp 36.6 °C (97.8 °F) 11/8/2019 12:18 PM   Pulse 73 11/8/2019 12:39 PM   Resp 13 11/8/2019 12:39 PM   SpO2 95 % 11/8/2019 12:39 PM   Vitals shown include unvalidated device data.      No case tracking events are documented in the log.      Pain/Yahir Score: Pain Rating Prior to Med Admin: 0 (11/8/2019 11:30 AM)  Yahir Score: 9 (11/8/2019 12:18 PM)

## 2019-11-08 NOTE — TRANSFER OF CARE
"Anesthesia Transfer of Care Note    Patient: Gi Abdullahi    Procedure(s) Performed: Procedure(s) (LRB):  BIOPSY, BREAST; xray loc US LOC @11A (Right)    Patient location: PACU    Anesthesia Type: general    Transport from OR: Transported from OR on room air with adequate spontaneous ventilation    Post pain: adequate analgesia    Post assessment: no apparent anesthetic complications    Post vital signs: stable    Level of consciousness: awake and alert    Nausea/Vomiting: no nausea/vomiting    Complications: none    Transfer of care protocol was followed      Last vitals:   Visit Vitals  /73 (BP Location: Left arm, Patient Position: Lying)   Pulse 79   Temp 36.7 °C (98.1 °F) (Oral)   Resp 16   Ht 5' 6" (1.676 m)   Wt 101.4 kg (223 lb 8.7 oz)   SpO2 96%   Breastfeeding? No   BMI 36.08 kg/m²     "

## 2019-11-08 NOTE — BRIEF OP NOTE
[facility]  Brief Operative Note     SUMMARY     Surgery Date: 11/8/2019     Surgeon(s) and Role:     * Roni Arias MD - Primary    Assisting Surgeon: None    Pre-op Diagnosis:  Breast mass, right [N63.10]    Post-op Diagnosis:  Post-Op Diagnosis Codes:     * Breast mass, right [N63.10]    Procedure(s) (LRB):  BIOPSY, BREAST; xray loc US LOC @11A (Right)    Anesthesia: General    Description of the findings of the procedure:  See dictation    Findings/Key Components:  See dictation    Estimated Blood Loss: 5 mL         Specimens:   Specimen (12h ago, onward)     Start     Ordered    11/08/19 1157  Specimen to Pathology - Surgery  Once     Comments:  Pre-op Diagnosis: Breast mass, right [N63.10]Procedure(s):BIOPSY, BREAST; xray loc US LOC @11A Number of specimens:1Name of specimens: right breast biopsy      11/08/19 1207                Discharge Note    SUMMARY     Admit Date: 11/8/2019    Discharge Date and Time:  11/08/2019 12:18 PM    Hospital Course (synopsis of major diagnoses, care, treatment, and services provided during the course of the hospital stay): x     Final Diagnosis: Post-Op Diagnosis Codes:     * Breast mass, right [N63.10]    Disposition: Home or Self Care    Follow Up/Patient Instructions:     Medications:  Reconciled Home Medications:      Medication List      CONTINUE taking these medications    busPIRone 15 MG tablet  Commonly known as:  BUSPAR  Take 15 mg by mouth 2 (two) times daily.     CYANOCOBALAMIN (VITAMIN B-12) ORAL  Take by mouth once daily.     FLUoxetine 20 MG capsule  Take 3 capsules (60 mg total) by mouth once daily.     multivitamin tablet  Commonly known as:  THERAGRAN  Take 1 tablet by mouth once daily.     traZODone 100 MG tablet  Commonly known as:  DESYREL  Take 100 mg by mouth every evening.          Discharge Procedure Orders   Diet general     Remove dressing in 48 hours     Activity as tolerated     Follow-up Information     Roni Arias MD In 1  week.    Specialties:  General Surgery, Surgery  Contact information:  [facility] [facility]  [address]  [facility] [id number] [phone]

## 2019-11-08 NOTE — OP NOTE
Date of service is 11/08/2019    Preoperative diagnosis:  Right breast mass    Postoperative diagnosis:  Same pending pathology    Procedure:  Right breast biopsy with x-ray localization    Description technical procedure and findings:    Patient was transported to the operating room. Prepped and draped in usual sterile fashion. After adequate induction of general anesthesia by department of anesthesia incision was made in the breast by the localizing wire. Core breast tissue around the localizing wire and the wire itself were excised en bloc and submitted to pathology. Wound was irrigated and hemostasis was achieved with electrocautery. Wound is closed in multiple layers of 4-0 Monocryl sutures. Sterile occlusive dressing was applied.

## 2019-11-12 ENCOUNTER — TELEPHONE (OUTPATIENT)
Dept: SURGERY | Facility: CLINIC | Age: 49
End: 2019-11-12

## 2019-11-12 NOTE — TELEPHONE ENCOUNTER
----- Message from Roni Arias MD sent at 11/12/2019  1:45 PM CST -----  Call patient: path benign, will discuss it during f/u visit

## 2019-11-18 ENCOUNTER — OFFICE VISIT (OUTPATIENT)
Dept: SURGERY | Facility: CLINIC | Age: 49
End: 2019-11-18
Payer: COMMERCIAL

## 2019-11-18 VITALS
TEMPERATURE: 98 F | WEIGHT: 223 LBS | HEART RATE: 73 BPM | DIASTOLIC BLOOD PRESSURE: 75 MMHG | SYSTOLIC BLOOD PRESSURE: 111 MMHG | BODY MASS INDEX: 35.84 KG/M2 | HEIGHT: 66 IN

## 2019-11-18 DIAGNOSIS — N63.10 BREAST MASS, RIGHT: Primary | ICD-10-CM

## 2019-11-18 PROCEDURE — 99024 PR POST-OP FOLLOW-UP VISIT: ICD-10-PCS | Mod: ,,, | Performed by: SURGERY

## 2019-11-18 PROCEDURE — 99024 POSTOP FOLLOW-UP VISIT: CPT | Mod: ,,, | Performed by: SURGERY

## 2019-11-18 NOTE — PROGRESS NOTES
Subjective:       Patient ID: Gi Abdullahi is a 49 y.o. female.    Chief Complaint: Post-op Evaluation (FU DOS 11/8/19 Right breast bx w/ xray loc )      HPI:  Gi Abdullahi is here for post-op. Patient has no systemic complaints. Post operative   pain is under control.  Tolerating diet, no nausea/vomiting.  Having normal bowel movements.        Objective:      Physical Exam   Constitutional: She is oriented to person, place, and time. She is cooperative. No distress.   Neurological: She is alert and oriented to person, place, and time.   Skin:   Incisions are clean, dry and intact   There is no evidence of infection, hematoma or seroma.        Assessment/Plan:   Breast mass, right  -     Mammo Digital Diagnostic Right w/ [facility]; Future; Expected date: 02/18/2020      Doing well postop.  Right mammogram in 3 months to obtain a new baseline.      Follow up if symptoms worsen or fail to improve.

## 2019-11-25 ENCOUNTER — TELEPHONE (OUTPATIENT)
Dept: SURGERY | Facility: CLINIC | Age: 49
End: 2019-11-25

## 2019-11-25 NOTE — TELEPHONE ENCOUNTER
Pt called req poss abx.. States incision is draining... No pain.. Area is not warm to touch.. Slight redness around the area.. Drainage does not have a foul odor.. Just slightly blood tinged.. She states she put (what sounds like a steri strip) over the area... Per recommendation from pharmacist..     Informed pt.. drainage sounds like serous fluid.. And advised not to cover with steri.. Instructed pt to keep area clean and dry..  cover with gauze.. And change as needed... Avoid ointments/lotions ..    Informed pt if area becomes red, warm to touch, has a foul odor, painful, or amount of drainage increases significantly.. To call the office.. Or report to nearest urgent care/ [facility] ER if needed..    Pt verbalized understanding .

## 2019-12-14 ENCOUNTER — LAB VISIT (OUTPATIENT)
Dept: LAB | Facility: HOSPITAL | Age: 49
End: 2019-12-14
Attending: INTERNAL MEDICINE
Payer: COMMERCIAL

## 2019-12-14 DIAGNOSIS — E66.8 OBESITY OF ENDOCRINE ORIGIN: ICD-10-CM

## 2019-12-14 DIAGNOSIS — E78.5 HYPERLIPEMIA: Primary | ICD-10-CM

## 2019-12-14 DIAGNOSIS — N95.8 POSTARTIFICIAL MENOPAUSAL SYNDROME: ICD-10-CM

## 2019-12-14 LAB
ALBUMIN SERPL BCP-MCNC: 3.3 G/DL (ref 3.5–5.2)
ALP SERPL-CCNC: 92 U/L (ref 55–135)
ALT SERPL W/O P-5'-P-CCNC: 24 U/L (ref 10–44)
ANION GAP SERPL CALC-SCNC: 6 MMOL/L (ref 8–16)
AST SERPL-CCNC: 15 U/L (ref 10–40)
BASOPHILS # BLD AUTO: 0.07 K/UL (ref 0–0.2)
BASOPHILS NFR BLD: 1.3 % (ref 0–1.9)
BILIRUB SERPL-MCNC: 0.2 MG/DL (ref 0.1–1)
BUN SERPL-MCNC: 13 MG/DL (ref 6–20)
CALCIUM SERPL-MCNC: 9 MG/DL (ref 8.7–10.5)
CHLORIDE SERPL-SCNC: 110 MMOL/L (ref 95–110)
CHOLEST SERPL-MCNC: 239 MG/DL (ref 120–199)
CHOLEST/HDLC SERPL: 3.2 {RATIO} (ref 2–5)
CO2 SERPL-SCNC: 24 MMOL/L (ref 23–29)
CREAT SERPL-MCNC: 0.7 MG/DL (ref 0.5–1.4)
DIFFERENTIAL METHOD: ABNORMAL
EOSINOPHIL # BLD AUTO: 0.1 K/UL (ref 0–0.5)
EOSINOPHIL NFR BLD: 2.6 % (ref 0–8)
ERYTHROCYTE [DISTWIDTH] IN BLOOD BY AUTOMATED COUNT: 13.9 % (ref 11.5–14.5)
EST. GFR  (AFRICAN AMERICAN): >60 ML/MIN/1.73 M^2
EST. GFR  (NON AFRICAN AMERICAN): >60 ML/MIN/1.73 M^2
ESTRADIOL SERPL-MCNC: <10 PG/ML
FSH SERPL-ACNC: 60.4 MIU/ML
GLUCOSE SERPL-MCNC: 88 MG/DL (ref 70–110)
HCT VFR BLD AUTO: 45.2 % (ref 37–48.5)
HDLC SERPL-MCNC: 75 MG/DL (ref 40–75)
HDLC SERPL: 31.4 % (ref 20–50)
HGB BLD-MCNC: 14.1 G/DL (ref 12–16)
IMM GRANULOCYTES # BLD AUTO: 0.01 K/UL (ref 0–0.04)
IMM GRANULOCYTES NFR BLD AUTO: 0.2 % (ref 0–0.5)
LDLC SERPL CALC-MCNC: 144 MG/DL (ref 63–159)
LH SERPL-ACNC: 33.2 MIU/ML
LYMPHOCYTES # BLD AUTO: 2.3 K/UL (ref 1–4.8)
LYMPHOCYTES NFR BLD: 41.8 % (ref 18–48)
MCH RBC QN AUTO: 29.6 PG (ref 27–31)
MCHC RBC AUTO-ENTMCNC: 31.2 G/DL (ref 32–36)
MCV RBC AUTO: 95 FL (ref 82–98)
MONOCYTES # BLD AUTO: 0.3 K/UL (ref 0.3–1)
MONOCYTES NFR BLD: 6.3 % (ref 4–15)
NEUTROPHILS # BLD AUTO: 2.6 K/UL (ref 1.8–7.7)
NEUTROPHILS NFR BLD: 47.8 % (ref 38–73)
NONHDLC SERPL-MCNC: 164 MG/DL
NRBC BLD-RTO: 0 /100 WBC
PLATELET # BLD AUTO: 389 K/UL (ref 150–350)
PMV BLD AUTO: 9.7 FL (ref 9.2–12.9)
POTASSIUM SERPL-SCNC: 4.1 MMOL/L (ref 3.5–5.1)
PROT SERPL-MCNC: 6.8 G/DL (ref 6–8.4)
RBC # BLD AUTO: 4.77 M/UL (ref 4–5.4)
SODIUM SERPL-SCNC: 140 MMOL/L (ref 136–145)
TRIGL SERPL-MCNC: 100 MG/DL (ref 30–150)
WBC # BLD AUTO: 5.43 K/UL (ref 3.9–12.7)

## 2019-12-14 PROCEDURE — 80061 LIPID PANEL: CPT

## 2019-12-14 PROCEDURE — 85025 COMPLETE CBC W/AUTO DIFF WBC: CPT

## 2019-12-14 PROCEDURE — 80053 COMPREHEN METABOLIC PANEL: CPT

## 2019-12-14 PROCEDURE — 36415 COLL VENOUS BLD VENIPUNCTURE: CPT | Mod: PO

## 2019-12-14 PROCEDURE — 82670 ASSAY OF TOTAL ESTRADIOL: CPT

## 2019-12-14 PROCEDURE — 83002 ASSAY OF GONADOTROPIN (LH): CPT

## 2019-12-14 PROCEDURE — 83001 ASSAY OF GONADOTROPIN (FSH): CPT

## 2020-01-27 ENCOUNTER — TELEPHONE (OUTPATIENT)
Dept: SURGERY | Facility: CLINIC | Age: 50
End: 2020-01-27

## 2020-01-27 DIAGNOSIS — N63.10 BREAST MASS, RIGHT: Primary | ICD-10-CM

## 2020-01-27 NOTE — TELEPHONE ENCOUNTER
[facility] scheduler request U/S order to go w/ RT diagnostic MMG (ordered 11/18/19- due in 3 mon).    Please Sign.

## 2020-01-27 NOTE — TELEPHONE ENCOUNTER
----- Message from Emely Hernandez sent at 1/21/2020 10:45 AM CST -----  Regarding: BREAST ULTRASOUND ORDER  Please submit an order for a Breast Ultrasound with all Diagnostic Mammograms.    Thanks,

## 2020-02-12 DIAGNOSIS — N63.10 LUMP OF RIGHT BREAST: Primary | ICD-10-CM

## 2020-04-02 ENCOUNTER — HOSPITAL ENCOUNTER (EMERGENCY)
Facility: HOSPITAL | Age: 50
Discharge: HOME | End: 2020-04-02
Attending: EMERGENCY MEDICINE
Payer: COMMERCIAL

## 2020-04-02 ENCOUNTER — NURSE TRIAGE (OUTPATIENT)
Dept: ADMINISTRATIVE | Facility: CLINIC | Age: 50
End: 2020-04-02

## 2020-04-02 VITALS
WEIGHT: 223 LBS | BODY MASS INDEX: 35.84 KG/M2 | RESPIRATION RATE: 16 BRPM | SYSTOLIC BLOOD PRESSURE: 128 MMHG | OXYGEN SATURATION: 99 % | TEMPERATURE: 97 F | DIASTOLIC BLOOD PRESSURE: 60 MMHG | HEART RATE: 78 BPM | HEIGHT: 66 IN

## 2020-04-02 DIAGNOSIS — J06.9 VIRAL URI WITH COUGH: Primary | ICD-10-CM

## 2020-04-02 DIAGNOSIS — R07.9 CHEST PAIN: ICD-10-CM

## 2020-04-02 DIAGNOSIS — R05.9 COUGH: ICD-10-CM

## 2020-04-02 PROCEDURE — 99283 EMERGENCY DEPT VISIT LOW MDM: CPT | Mod: 25 | Performed by: NURSE PRACTITIONER

## 2020-04-02 PROCEDURE — 93005 ELECTROCARDIOGRAM TRACING: CPT

## 2020-04-02 NOTE — ED NOTES
Pt here for evaluation of fever, cough and sore throat.  Pt is awake, alert and oriented. Resp even and unlabored. Reg breath sounds clear.  See NP exam.

## 2020-04-02 NOTE — TELEPHONE ENCOUNTER
Spoke with pt:s/s started Monday. Works in grocery store.     Chest pain-deep breath hurts in chest rated as moderate and will feel mild pain otherwise and is constant. , SOB-- started yesterday--with exertion.  fever at night--highest 101.1F.  This am SOB with walking to mail box and is SOB at rest at this time.  Cough not noticeable. Woke with sore throat sore. Severe headaches intermittent. No changes in urine. extreme fatigued. [facility] or [facility]--  [facility] website.  Breathing  difficulty protocol recommends ED evaluation-SOB at rest.pt verbalizes understanding.       Reason for Disposition   MODERATE difficulty breathing (e.g., speaks in phrases, SOB even at rest, pulse 100-120) of new onset or worse than normal    Additional Information   Negative: Breathing stopped and hasn't returned   Negative: Choking on something   Negative: SEVERE difficulty breathing (e.g., struggling for each breath, speaks in single words, pulse > 120)   Negative: Bluish (or gray) lips or face   Negative: Difficult to awaken or acting confused (e.g., disoriented, slurred speech)   Negative: Passed out (i.e., fainted, collapsed and was not responding)   Negative: Wheezing started suddenly after medicine, an allergic food, or bee sting   Negative: Stridor   Negative: Slow, shallow and weak breathing   Negative: Sounds like a life-threatening emergency to the triager    Protocols used: BREATHING DIFFICULTY-A-[place]

## 2020-04-02 NOTE — TELEPHONE ENCOUNTER
Reason for Disposition   Caller has already spoken with another triager and has no further questions    Additional Information   Negative: Caller has already spoken with the PCP (or office), and has no further questions    Protocols used: NO CONTACT OR DUPLICATE CONTACT CALL-A-[place]

## 2020-09-28 NOTE — ED PROVIDER NOTES
Encounter Date: 4/2/2020    SCRIBE #1 NOTE: IKavita, bertha scribing for, and in the presence of, Chasity Linares NP.       History     Chief Complaint   Patient presents with    Fever    Cough    Sore Throat       Time seen by provider: 1:01 PM on 04/02/2020    Gi Abdullahi is a 49 y.o. female with PMHx of cervical cancer and arthritis who presents to the ED with c/o fever, sore throat, shortness of breath with exertion and chest pain with deep breaths since yesterday. The patient denies leg swelling or any other symptoms at this time. PSHx of hysterectomy and cholecystectomy.      The history is provided by the patient.     Review of patient's allergies indicates:  No Known Allergies  Past Medical History:   Diagnosis Date    Arthritis     Cervical cancer     Depression     Insomnia      Past Surgical History:   Procedure Laterality Date    BREAST BIOPSY Right 11/8/2019    Procedure: BIOPSY, BREAST;  Surgeon: Roni Arias MD;  Location: [facility] OR;  Service: General;  Laterality: Right;    CERVIX REMOVAL      HEMORRHOID SURGERY  2018    Dr Quintero    HYSTERECTOMY      LAPAROSCOPIC CHOLECYSTECTOMY N/A 9/3/2018    Procedure: CHOLECYSTECTOMY, LAPAROSCOPIC;  Surgeon: Wilmer Degroot MD;  Location: [facility] OR;  Service: General;  Laterality: N/A;     Family History   Problem Relation Age of Onset    Hypertension Mother     Lymphoma Father      Social History     Tobacco Use    Smoking status: Current Every Day Smoker     Packs/day: 1.00     Years: 30.00     Pack years: 30.00    Smokeless tobacco: Never Used   Substance Use Topics    Alcohol use: Never     Frequency: Never    Drug use: Never     Review of Systems   Constitutional: Positive for fever. Negative for chills.   HENT: Positive for sore throat. Negative for congestion and rhinorrhea.    Eyes: Negative for visual disturbance.   Respiratory: Positive for shortness of breath.    Cardiovascular: Positive for chest pain. Negative for leg  Jose Redd at [address] [employer] [employer] is calling to collaborate with Patrick Dalal regarding patient's base line. Patient is currently in patient at [address] [employer] for a medical reason. swelling.   Gastrointestinal: Negative for abdominal pain and nausea.   Genitourinary: Negative for dysuria.   Musculoskeletal: Negative for myalgias and neck pain.   Skin: Negative for rash.   Neurological: Negative for headaches.       Physical Exam     Initial Vitals [04/02/20 1257]   BP Pulse Resp Temp SpO2   128/60 78 16 96.9 °F (36.1 °C) 99 %      MAP       --         Physical Exam    Nursing note and vitals reviewed.  Constitutional: Vital signs are normal. She appears well-developed and well-nourished.   HENT:   Head: Normocephalic and atraumatic.   Mouth/Throat: Uvula is midline, oropharynx is clear and moist and mucous membranes are normal.   Eyes: Pupils are equal, round, and reactive to light.   Neck: Neck supple.   Cardiovascular: Normal rate, regular rhythm, normal heart sounds and intact distal pulses. Exam reveals no gallop and no friction rub.    No murmur heard.  Pulmonary/Chest: Breath sounds normal. She has no wheezes. She has no rhonchi. She has no rales.   Abdominal: Normal appearance.   Neurological: She is alert and oriented to person, place, and time. She has normal strength.   Skin: Skin is warm, dry and intact.   Psychiatric: She has a normal mood and affect. Her speech is normal and behavior is normal.         ED Course   Procedures  Labs Reviewed - No data to display     ECG Results          EKG 12-lead (In process)  Result time 04/02/20 13:32:12    In process by Interface, Lab In [facility] (04/02/20 13:32:12)                 Narrative:    Test Reason : R07.9,    Vent. Rate : 075 BPM     Atrial Rate : 075 BPM     P-R Int : 160 ms          QRS Dur : 080 ms      QT Int : 378 ms       P-R-T Axes : 029 025 043 degrees     QTc Int : 422 ms    Normal sinus rhythm  Normal ECG  When compared with ECG of 06-NOV-2019 14:04,  No significant change was found    Referred By: [email or web address]   SELF           Confirmed By:                             Imaging Results    None          Medical Decision Making:    History:   Old Medical Records: I decided to obtain old medical records.  Differential Diagnosis:   URI  Bronchitis    Independently Interpreted Test(s):   I have ordered and independently interpreted EKG Reading(s) - see summary below  Clinical Tests:   Radiological Study: Ordered and Reviewed  Medical Tests: Ordered and Reviewed       APC / Resident Notes:   Patient is a 49 y.o. female who presents to the ED 04/02/2020 who underwent emergent evaluation for fever, cough, and sore throat, for 3 days.  Patient also reports some chest pain with coughing only.  The pain is pleuritic in nature.  Is reproducible.  EKG without acute ST or T-wave abnormalities.  Do not think ACS.  Bilateral breath sounds clear respirations even nonlabored.  I do not think emergent process such as pneumothorax or dissection.  Patient is a grocery .  The patient does not have an underlying immunosuppressed state and is not on any immunocompromising medications.  The patient is not pregnant, has no hx of organ transplant, hemodialysis, chronic lung disease, active cancer, advanced HIV, and does not live in a communal setting.  Patient does not meet criteria to be checked for Covid - 19 at this time. However this is likely her diagnosis and she is educated on appropriately quarantine and return precautions. The patient appears to have a viral upper respiratory infection.  Based upon the history and physical exam the patient does not appear to have a serious bacterial infection such as pneumonia, sepsis, otitis media, bacterial sinusitis, strep pharyngitis, parapharyngeal or peritonsillar abscess, meningitis.  Patient appears very well and I have given specific return precautions to the patient and/or family members.  The patient can take over the counter medications and does not appear to need antibiotics at this time.               Scribe Attestation:   Scribe #1: I performed the above scribed service and the documentation  accurately describes the services I performed. I attest to the accuracy of the note.    Attending Attestation:           Physician Attestation for Scribe:  Physician Attestation Statement for Scribe #1: I, Chasity Linares, reviewed documentation, as scribed by in my presence, and it is both accurate and complete.     Comments: I, PINA Rendon, personally performed the services described in this documentation. All medical record entries made by the scribe were at my direction and in my presence.  I have reviewed the chart and agree that the record reflects my personal performance and is accurate and complete. PINA Rendon.  4:25 PM 04/02/2020             ED Course as of Apr 02 1418   Thu Apr 02, 2020   1318 SpO2: 99 % [EF]   1318 Resp: 16 [EF]   1318 Pulse: 78 [EF]   1318 Temp src: Oral [EF]   1318 Temp: 96.9 °F (36.1 °C) [EF]   1318 BP: 128/60 [EF]   1321 EKG sinus rhythm 75 beats per minute normal axis no ST segment elevation or depression or T-wave inversion independently interpreted    [EF]      ED Course User Index  [EF] Rocky Asher MD                Clinical Impression:       ICD-10-CM ICD-9-CM   1. Viral URI with cough J06.9 465.9    B97.89    2. Chest pain R07.9 786.50   3. Cough R05 786.2         Disposition:   Disposition: Discharged  Condition: Stable     ED Disposition Condition    Discharge Stable        ED Prescriptions     None        Follow-up Information     Follow up With Specialties Details Why Contact Info    Quentin Pham MD Internal Medicine In 2 days  [address] [place] [address] [address]  [facility] [address]  [phone]      [facility] Emergency Medicine  As needed, If symptoms worsen [address]  [facility] [address] [phone]                                     Chasity Linares NP  04/02/20 9982

## 2020-10-01 ENCOUNTER — OFFICE VISIT (OUTPATIENT)
Dept: ORTHOPEDICS | Facility: CLINIC | Age: 50
End: 2020-10-01
Payer: COMMERCIAL

## 2020-10-01 VITALS
HEIGHT: 66 IN | WEIGHT: 199 LBS | SYSTOLIC BLOOD PRESSURE: 116 MMHG | DIASTOLIC BLOOD PRESSURE: 70 MMHG | BODY MASS INDEX: 31.98 KG/M2 | HEART RATE: 67 BPM

## 2020-10-01 DIAGNOSIS — M65.4 DE QUERVAIN'S TENOSYNOVITIS, LEFT: Primary | ICD-10-CM

## 2020-10-01 DIAGNOSIS — M67.431 GANGLION CYST OF DORSUM OF RIGHT WRIST: ICD-10-CM

## 2020-10-01 PROCEDURE — 3008F BODY MASS INDEX DOCD: CPT | Mod: S$GLB,,, | Performed by: ORTHOPAEDIC SURGERY

## 2020-10-01 PROCEDURE — 20605 PR DRAIN/INJECT INTERMEDIATE JOINT/BURSA: ICD-10-PCS | Mod: 59,RT,S$GLB, | Performed by: ORTHOPAEDIC SURGERY

## 2020-10-01 PROCEDURE — 99204 OFFICE O/P NEW MOD 45 MIN: CPT | Mod: 25,S$GLB,, | Performed by: ORTHOPAEDIC SURGERY

## 2020-10-01 PROCEDURE — 3008F PR BODY MASS INDEX (BMI) DOCUMENTED: ICD-10-PCS | Mod: S$GLB,,, | Performed by: ORTHOPAEDIC SURGERY

## 2020-10-01 PROCEDURE — 99204 PR OFFICE/OUTPT VISIT, NEW, LEVL IV, 45-59 MIN: ICD-10-PCS | Mod: 25,S$GLB,, | Performed by: ORTHOPAEDIC SURGERY

## 2020-10-01 PROCEDURE — 20550: ICD-10-PCS | Mod: RT,S$GLB,, | Performed by: ORTHOPAEDIC SURGERY

## 2020-10-01 PROCEDURE — 20550 NJX 1 TENDON SHEATH/LIGAMENT: CPT | Mod: RT,S$GLB,, | Performed by: ORTHOPAEDIC SURGERY

## 2020-10-01 PROCEDURE — 20605 DRAIN/INJ JOINT/BURSA W/O US: CPT | Mod: 59,RT,S$GLB, | Performed by: ORTHOPAEDIC SURGERY

## 2020-10-01 RX ORDER — METHYLPREDNISOLONE ACETATE 40 MG/ML
40 INJECTION, SUSPENSION INTRA-ARTICULAR; INTRALESIONAL; INTRAMUSCULAR; SOFT TISSUE
Status: DISCONTINUED | OUTPATIENT
Start: 2020-10-01 | End: 2020-10-01 | Stop reason: HOSPADM

## 2020-10-01 RX ADMIN — METHYLPREDNISOLONE ACETATE 40 MG: 40 INJECTION, SUSPENSION INTRA-ARTICULAR; INTRALESIONAL; INTRAMUSCULAR; SOFT TISSUE at 10:10

## 2020-10-01 NOTE — PROGRESS NOTES
[facility]    Subjective:     Chief Complaint:   Chief Complaint   Patient presents with    Right Wrist - Pain     Right wrist pain/ lump for a while. States that her pain has gotten worse in the last 2 months. States that she has a lump on the hand/wrist area that hurts when she bends the wrist. Pain radiates to the hand. Hard to  things, stiff,     Left Wrist - Pain     Left wrist pain/ lump for a while. States that her pain has gotten worse in the last 2 months. States that she has a lump on the hand/wrist area that hurts when she bends the wrist. Pain radiates to the hand. Hard to  things, stiff,        Past Medical History:   Diagnosis Date    Arthritis     Cervical cancer     Depression     Insomnia        Past Surgical History:   Procedure Laterality Date    BREAST BIOPSY Right 11/8/2019    Procedure: BIOPSY, BREAST;  Surgeon: Roni Arias MD;  Location: [facility] OR;  Service: General;  Laterality: Right;    CERVIX REMOVAL      HEMORRHOID SURGERY  2018    Dr Quintero    HYSTERECTOMY      LAPAROSCOPIC CHOLECYSTECTOMY N/A 9/3/2018    Procedure: CHOLECYSTECTOMY, LAPAROSCOPIC;  Surgeon: Wilmer Degroot MD;  Location: [facility] OR;  Service: General;  Laterality: N/A;       Current Outpatient Medications   Medication Sig    FLUoxetine 20 MG capsule Take 3 capsules (60 mg total) by mouth once daily.     Current Facility-Administered Medications   Medication    lidocaine (PF) 10 mg/ml (1%) injection 10 mg       Review of patient's allergies indicates:  No Known Allergies    Family History   Problem Relation Age of Onset    Hypertension Mother     Lymphoma Father        Social History     Socioeconomic History    Marital status:      Spouse name: Not on file    Number of children: Not on file    Years of education: Not on file    Highest education level: Not on file   Occupational History    Not on file   Social Needs    Financial resource strain: Not on file    Food  insecurity     Worry: Not on file     Inability: Not on file    Transportation needs     Medical: Not on file     Non-medical: Not on file   Tobacco Use    Smoking status: Current Every Day Smoker     Packs/day: 1.00     Years: 30.00     Pack years: 30.00    Smokeless tobacco: Never Used   Substance and Sexual Activity    Alcohol use: Never     Frequency: Never    Drug use: Never    Sexual activity: Not on file   Lifestyle    Physical activity     Days per week: Not on file     Minutes per session: Not on file    Stress: Not on file   Relationships    Social connections     Talks on phone: Not on file     Gets together: Not on file     Attends Muslim service: Not on file     Active member of club or organization: Not on file     Attends meetings of clubs or organizations: Not on file     Relationship status: Not on file   Other Topics Concern    Not on file   Social History Narrative    Not on file       History of present illness:  Patient comes in today for the bilateral wrists in terms of her right wrist she has a dorsal ganglion which she has had for years but it has become painful.  In terms of the left wrist she has pain over the radial side of the wrist and that is been going on for several months.  She denies any trauma.      Review of Systems:    Constitution: Negative for chills, fever, and sweats.  Negative for unexplained weight loss.    HENT:  Negative for headaches and blurry vision.    Cardiovascular:Negative for chest pain or irregular heart beat. Negative for hypertension.    Respiratory:  Negative for cough and shortness of breath.    Gastrointestinal: Negative for abdominal pain, heartburn, melena, nausea, and vomitting.    Genitourinary:  Negative bladder incontinence and dysuria.    Musculoskeletal:  See HPI for details.     Neurological: Negative for numbness.    Psychiatric/Behavioral: Negative for depression.  The patient is not nervous/anxious.      Endocrine: Negative for  polyuria    Hematologic/Lymphatic: Negative for bleeding problem.  Does not bruise/bleed easily.    Skin: Negative for poor would healing and rash    Objective:      Physical Examination:    Vital Signs:    Vitals:    10/01/20 1012   BP: 116/70   Pulse: 67       Body mass index is 32.12 kg/m².    This a well-developed, well nourished patient in no acute distress.  They are alert and oriented and cooperative to examination.        Patient has full range of motion of her bilateral wrist.  She has a large dorsal ganglion.  She has a negative Finkelstein's negative Tinel's on the right.  Full range of motion of the left wrist.  Positive Finkelstein's on the left side negative Tinel's.  Spurling sign is negative.  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  No fractures or subluxations.    AP and lateral the left wrist demonstrates mild basilar arthritis no fractures or subluxations.    Assessment/Plan:      De Quervain tenosynovitis left wrist.  I injected the left 1st dorsal compartment with Depo-Medrol and lidocaine.  In terms of the right wrist she has a right ganglion dorsal.  I aspirated that ganglion.  She will follow-up in 6 weeks      This note was created using Dragon voice recognition software that occasionally misinterpreted phrases or words.

## 2020-10-01 NOTE — PROCEDURES
Tendon Sheath    Date/Time: 10/1/2020 10:00 AM  Performed by: Adis Mccann MD  Authorized by: Adis Mccann MD     Consent Done?:  Yes (Verbal)  Indications:  Pain  Site marked: the procedure site was marked    Timeout: prior to procedure the correct patient, procedure, and site was verified    Prep: patient was prepped and draped in usual sterile fashion      Local anesthesia used?: Yes    Local anesthetic:  Lidocaine 1% without epinephrine  Location:  Wrist  Site:  L first doral compartment  Ultrasonic guidance for needle placement?: No    Needle size:  25 G  Medications:  40 mg methylPREDNISolone acetate 40 mg/mL  Patient tolerance:  Patient tolerated the procedure well with no immediate complications  Aspiration Cyst    Date/Time: 10/1/2020 10:00 AM  Performed by: Adis Mccann MD  Authorized by: Adis Mccann MD     Consent Done?:  Yes (Verbal)  Indications:  Pain  Site marked: the procedure site was marked    Timeout: prior to procedure the correct patient, procedure, and site was verified    Prep: patient was prepped and draped in usual sterile fashion      Local anesthesia used?: No    Location:  Wrist  Site:  R first doral compartment  Ultrasonic guidance for needle placement?: No    Needle size:  25 G  Patient tolerance:  Patient tolerated the procedure well with no immediate complications    Additional Comments: NO MED ADMINISTERED ONLY ASPIRATION

## 2020-11-12 ENCOUNTER — OFFICE VISIT (OUTPATIENT)
Dept: ORTHOPEDICS | Facility: CLINIC | Age: 50
End: 2020-11-12
Payer: COMMERCIAL

## 2020-11-12 ENCOUNTER — OFFICE VISIT (OUTPATIENT)
Dept: URGENT CARE | Facility: CLINIC | Age: 50
End: 2020-11-12
Payer: COMMERCIAL

## 2020-11-12 ENCOUNTER — TELEPHONE (OUTPATIENT)
Dept: ORTHOPEDICS | Facility: CLINIC | Age: 50
End: 2020-11-12

## 2020-11-12 VITALS
DIASTOLIC BLOOD PRESSURE: 70 MMHG | TEMPERATURE: 98 F | OXYGEN SATURATION: 99 % | WEIGHT: 226 LBS | HEART RATE: 60 BPM | HEIGHT: 65 IN | SYSTOLIC BLOOD PRESSURE: 120 MMHG | BODY MASS INDEX: 37.65 KG/M2

## 2020-11-12 VITALS
SYSTOLIC BLOOD PRESSURE: 118 MMHG | WEIGHT: 199 LBS | HEART RATE: 76 BPM | OXYGEN SATURATION: 99 % | HEIGHT: 66 IN | BODY MASS INDEX: 31.98 KG/M2 | DIASTOLIC BLOOD PRESSURE: 68 MMHG

## 2020-11-12 DIAGNOSIS — H65.93 OME (OTITIS MEDIA WITH EFFUSION), BILATERAL: ICD-10-CM

## 2020-11-12 DIAGNOSIS — M67.431 GANGLION CYST OF DORSUM OF RIGHT WRIST: ICD-10-CM

## 2020-11-12 DIAGNOSIS — M67.431 GANGLION OF RIGHT WRIST: ICD-10-CM

## 2020-11-12 DIAGNOSIS — H93.13 TINNITUS OF BOTH EARS: Primary | ICD-10-CM

## 2020-11-12 DIAGNOSIS — M65.4 DE QUERVAIN'S TENOSYNOVITIS, LEFT: Primary | ICD-10-CM

## 2020-11-12 DIAGNOSIS — Z01.818 PREOP EXAMINATION: ICD-10-CM

## 2020-11-12 PROCEDURE — 99213 PR OFFICE/OUTPT VISIT, EST, LEVL III, 20-29 MIN: ICD-10-PCS | Mod: S$GLB,,, | Performed by: PHYSICIAN ASSISTANT

## 2020-11-12 PROCEDURE — 3008F PR BODY MASS INDEX (BMI) DOCUMENTED: ICD-10-PCS | Mod: CPTII,S$GLB,, | Performed by: NURSE PRACTITIONER

## 2020-11-12 PROCEDURE — 99204 PR OFFICE/OUTPT VISIT, NEW, LEVL IV, 45-59 MIN: ICD-10-PCS | Mod: 25,S$GLB,, | Performed by: NURSE PRACTITIONER

## 2020-11-12 PROCEDURE — 99213 OFFICE O/P EST LOW 20 MIN: CPT | Mod: S$GLB,,, | Performed by: PHYSICIAN ASSISTANT

## 2020-11-12 PROCEDURE — 96372 PR INJECTION,THERAP/PROPH/DIAG2ST, IM OR SUBCUT: ICD-10-PCS | Mod: S$GLB,,, | Performed by: NURSE PRACTITIONER

## 2020-11-12 PROCEDURE — 3008F BODY MASS INDEX DOCD: CPT | Mod: CPTII,S$GLB,, | Performed by: NURSE PRACTITIONER

## 2020-11-12 PROCEDURE — 96372 THER/PROPH/DIAG INJ SC/IM: CPT | Mod: S$GLB,,, | Performed by: NURSE PRACTITIONER

## 2020-11-12 PROCEDURE — 99204 OFFICE O/P NEW MOD 45 MIN: CPT | Mod: 25,S$GLB,, | Performed by: NURSE PRACTITIONER

## 2020-11-12 RX ORDER — FLUTICASONE PROPIONATE 50 MCG
2 SPRAY, SUSPENSION (ML) NASAL 2 TIMES DAILY
Qty: 15.8 ML | Refills: 0 | Status: SHIPPED | OUTPATIENT
Start: 2020-11-12 | End: 2020-11-23 | Stop reason: ALTCHOICE

## 2020-11-12 RX ORDER — CETIRIZINE HYDROCHLORIDE 10 MG/1
10 TABLET ORAL DAILY
Qty: 30 TABLET | Refills: 2 | Status: SHIPPED | OUTPATIENT
Start: 2020-11-12 | End: 2020-11-23 | Stop reason: ALTCHOICE

## 2020-11-12 RX ORDER — MECLIZINE HYDROCHLORIDE 25 MG/1
25 TABLET ORAL 3 TIMES DAILY PRN
Qty: 30 TABLET | Refills: 0 | Status: SHIPPED | OUTPATIENT
Start: 2020-11-12 | End: 2020-11-23 | Stop reason: ALTCHOICE

## 2020-11-12 RX ORDER — DEXAMETHASONE SODIUM PHOSPHATE 4 MG/ML
8 INJECTION, SOLUTION INTRA-ARTICULAR; INTRALESIONAL; INTRAMUSCULAR; INTRAVENOUS; SOFT TISSUE
Status: COMPLETED | OUTPATIENT
Start: 2020-11-12 | End: 2020-11-12

## 2020-11-12 RX ADMIN — DEXAMETHASONE SODIUM PHOSPHATE 8 MG: 4 INJECTION, SOLUTION INTRA-ARTICULAR; INTRALESIONAL; INTRAMUSCULAR; INTRAVENOUS; SOFT TISSUE at 01:11

## 2020-11-12 NOTE — PROGRESS NOTES
[facility]  [address]  [place] [address]  Phone: [phone]   [phone]    Patient's PCP: Quentin Pham MD  Referring Provider: No ref. provider found    Subjective:      Chief Complaint:   Chief Complaint   Patient presents with    Right Wrist - Swelling, Pain     Right wrist pain follow up, lot of pressure feeling from cyst. Drained ganglion @ last visit on 10/1/2020, filled back up same day.     Left Wrist - Pain     Left wrist pain follow up, injected @ last visit 10/1/2020, no improvement         Past Medical History:   Diagnosis Date    Arthritis     Cervical cancer     Depression     Insomnia        Past Surgical History:   Procedure Laterality Date    BREAST BIOPSY Right 11/8/2019    Procedure: BIOPSY, BREAST;  Surgeon: Roni Arias MD;  Location: [facility] OR;  Service: General;  Laterality: Right;    CERVIX REMOVAL      HEMORRHOID SURGERY  2018    Dr Quintero    HYSTERECTOMY      LAPAROSCOPIC CHOLECYSTECTOMY N/A 9/3/2018    Procedure: CHOLECYSTECTOMY, LAPAROSCOPIC;  Surgeon: Wilmer Degroot MD;  Location: [facility] OR;  Service: General;  Laterality: N/A;       Current Outpatient Medications   Medication Sig    FLUoxetine 20 MG capsule Take 3 capsules (60 mg total) by mouth once daily.     Current Facility-Administered Medications   Medication    lidocaine (PF) 10 mg/ml (1%) injection 10 mg       Review of patient's allergies indicates:  No Known Allergies    Family History   Problem Relation Age of Onset    Hypertension Mother     Lymphoma Father        Social History     Socioeconomic History    Marital status:      Spouse name: Not on file    Number of children: Not on file    Years of education: Not on file    Highest education level: Not on file   Occupational History    Not on file   Social Needs    Financial resource strain: Not on file    Food insecurity     Worry: Not on file     Inability: Not on file    Transportation  needs     Medical: Not on file     Non-medical: Not on file   Tobacco Use    Smoking status: Current Every Day Smoker     Packs/day: 1.00     Years: 30.00     Pack years: 30.00    Smokeless tobacco: Never Used   Substance and Sexual Activity    Alcohol use: Never     Frequency: Never    Drug use: Never    Sexual activity: Not on file   Lifestyle    Physical activity     Days per week: Not on file     Minutes per session: Not on file    Stress: Not on file   Relationships    Social connections     Talks on phone: Not on file     Gets together: Not on file     Attends Orthodox service: Not on file     Active member of club or organization: Not on file     Attends meetings of clubs or organizations: Not on file     Relationship status: Not on file   Other Topics Concern    Not on file   Social History Narrative    Not on file       History of present illness: Patient returns to clinic today for the bilateral wrists.  We saw her a few weeks back, we aspirated a ganglion on the dorsal wrist on the right, we injected the left de Quervain tenosynovitis.  The ganglion has returned, this was before she got to the elevator after leaving the office last time and is bothersome to her she wishes to the seat with surgical excision.  The left wrist, de Quervain is slightly improved after injection.    Review of Systems:    Constitutional: Negative for chills, fever and weight loss.   HENT: Negative for congestion.    Eyes: Negative for discharge and redness.   Respiratory: Negative for cough and shortness of breath.    Cardiovascular: Negative for chest pain.   Gastrointestinal: Negative for nausea and vomiting.   Musculoskeletal: See HPI.   Skin: Negative for rash.   Neurological: Negative for headaches.   Endo/Heme/Allergies: Does not bruise/bleed easily.   Psychiatric/Behavioral: The patient is not nervous/anxious.    All other systems reviewed and are negative.       Objective:      Physical Examination:    Vital  Signs:    Vitals:    11/12/20 1241   BP: 118/68   Pulse: 76       Body mass index is 32.12 kg/m².    This a well-developed, well nourished patient in no acute distress.  They are alert and oriented and cooperative to examination.       Patient with a ganglion on the dorsal the right wrist.  Full range of motion of the right wrist.  Otherwise normal exam.  The left wrist, Finkelstein's still mildly positive.  No Phalen's, no Tinel's.  Normal range of motion of the wrist.      Pertinent New Results:        XRAY Report / Interpretation:             Assessment:       1. De Quervain's tenosynovitis, left    2. Ganglion cyst of dorsum of right wrist      Plan:     De Quervain's tenosynovitis, left    Ganglion cyst of dorsum of right wrist        Follow up for schedule cyst removal.      Right wrist ganglion cyst, plan is for surgical incision.  Risks and benefits were discussed to include possible need for additional surgery or return of the cyst.  Patient is left handed, will do the ganglion excision 1st.  If her de Quervain continues to bother her will do the de Quervain released afterwards.  Will see her back postoperatively.    @ERI@  DAPHNIE Harper PA-C    This note was created using [employer] voice recognition software that occasionally misinterprets words or phrases.

## 2020-11-12 NOTE — PATIENT INSTRUCTIONS
Tinnitus (Ringing in the Ears)     Treatment may include maskers and hearing aids.     Tinnitus is the term for a noise in your ear not caused by an outside sound. The noise might be a ringing, clicking, hiss, or roar. It can vary in pitch and may be soft or quite loud. For some people, tinnitus is a minor nuisance. But for others, the noise can make it hard to hear, work, and even sleep. When tinnitus can't be cured, a number of treatments may offer relief.  What causes tinnitus?  Loud noises, hearing loss, and ear wax can cause tinnitus. So can certain medicines. Large amounts of aspirin or caffeine are sometimes to blame. In many cases, the exact cause of tinnitus is unknown.  How is tinnitus treated?  Identifying and removing the cause is the best way to treat tinnitus. For that reason, your healthcare provider may refer you to an otolaryngologist (ear, nose, and throat doctor). Your hearing may also be checked by an audiologist (hearing specialist). If you have hearing loss, wearing a hearing aid may help your tinnitus. When the cause can't be found, the tinnitus itself may be treated. Some of the treatments are listed below, and your healthcare provider can tell you more about them:  · Maskers are small devices that look like hearing aids. They emit a pleasant sound that helps cover up the ringing in your ears. Hearing aids and maskers are sometimes used together.  · Medicines that treat anxiety and depression may ease tinnitus in some people.  · Hypnosis or relaxation therapy may help head noise seem less severe.  · Tinnitus retraining therapy combines counseling and maskers. Both can help take your mind off the tinnitus.  For more information  · American Speech-Hearing-Language Association [phone] www.suzan.org  · American Tinnitus Association 778-906-0989 www.velia.org  · National [place] on Deafness and other Communication Disorders 903-323-2394 www.nidcd.nih.gov   Date Last Reviewed: 7/1/2016  ©  5543-3605 [employer]. [address] [place] [address], [place] [address]. All rights reserved. This information is not intended as a substitute for professional medical care. Always follow your healthcare professional's instructions.

## 2020-11-12 NOTE — H&P (VIEW-ONLY)
[facility]ED[facility]  [address]  [place] [address]  Phone: [phone]   [phone]    Patient's PCP: Quentin Pham MD  Referring Provider: No ref. provider found    Subjective:      Chief Complaint:   Chief Complaint   Patient presents with    Right Wrist - Swelling, Pain     Right wrist pain follow up, lot of pressure feeling from cyst. Drained ganglion @ last visit on 10/1/2020, filled back up same day.     Left Wrist - Pain     Left wrist pain follow up, injected @ last visit 10/1/2020, no improvement         Past Medical History:   Diagnosis Date    Arthritis     Cervical cancer     Depression     Insomnia        Past Surgical History:   Procedure Laterality Date    BREAST BIOPSY Right 11/8/2019    Procedure: BIOPSY, BREAST;  Surgeon: Roni Arias MD;  Location: [facility] OR;  Service: General;  Laterality: Right;    CERVIX REMOVAL      HEMORRHOID SURGERY  2018    Dr Quintero    HYSTERECTOMY      LAPAROSCOPIC CHOLECYSTECTOMY N/A 9/3/2018    Procedure: CHOLECYSTECTOMY, LAPAROSCOPIC;  Surgeon: Wilmer Degroot MD;  Location: [facility] OR;  Service: General;  Laterality: N/A;       Current Outpatient Medications   Medication Sig    FLUoxetine 20 MG capsule Take 3 capsules (60 mg total) by mouth once daily.     Current Facility-Administered Medications   Medication    lidocaine (PF) 10 mg/ml (1%) injection 10 mg       Review of patient's allergies indicates:  No Known Allergies    Family History   Problem Relation Age of Onset    Hypertension Mother     Lymphoma Father        Social History     Socioeconomic History    Marital status:      Spouse name: Not on file    Number of children: Not on file    Years of education: Not on file    Highest education level: Not on file   Occupational History    Not on file   Social Needs    Financial resource strain: Not on file    Food insecurity     Worry: Not on file     Inability: Not on file    Transportation  needs     Medical: Not on file     Non-medical: Not on file   Tobacco Use    Smoking status: Current Every Day Smoker     Packs/day: 1.00     Years: 30.00     Pack years: 30.00    Smokeless tobacco: Never Used   Substance and Sexual Activity    Alcohol use: Never     Frequency: Never    Drug use: Never    Sexual activity: Not on file   Lifestyle    Physical activity     Days per week: Not on file     Minutes per session: Not on file    Stress: Not on file   Relationships    Social connections     Talks on phone: Not on file     Gets together: Not on file     Attends Rastafari service: Not on file     Active member of club or organization: Not on file     Attends meetings of clubs or organizations: Not on file     Relationship status: Not on file   Other Topics Concern    Not on file   Social History Narrative    Not on file       History of present illness: Patient returns to clinic today for the bilateral wrists.  We saw her a few weeks back, we aspirated a ganglion on the dorsal wrist on the right, we injected the left de Quervain tenosynovitis.  The ganglion has returned, this was before she got to the elevator after leaving the office last time and is bothersome to her she wishes to the seat with surgical excision.  The left wrist, de Quervain is slightly improved after injection.    Review of Systems:    Constitutional: Negative for chills, fever and weight loss.   HENT: Negative for congestion.    Eyes: Negative for discharge and redness.   Respiratory: Negative for cough and shortness of breath.    Cardiovascular: Negative for chest pain.   Gastrointestinal: Negative for nausea and vomiting.   Musculoskeletal: See HPI.   Skin: Negative for rash.   Neurological: Negative for headaches.   Endo/Heme/Allergies: Does not bruise/bleed easily.   Psychiatric/Behavioral: The patient is not nervous/anxious.    All other systems reviewed and are negative.       Objective:      Physical Examination:    Vital  Signs:    Vitals:    11/12/20 1241   BP: 118/68   Pulse: 76       Body mass index is 32.12 kg/m².    This a well-developed, well nourished patient in no acute distress.  They are alert and oriented and cooperative to examination.       Patient with a ganglion on the dorsal the right wrist.  Full range of motion of the right wrist.  Otherwise normal exam.  The left wrist, Finkelstein's still mildly positive.  No Phalen's, no Tinel's.  Normal range of motion of the wrist.      Pertinent New Results:        XRAY Report / Interpretation:             Assessment:       1. De Quervain's tenosynovitis, left    2. Ganglion cyst of dorsum of right wrist      Plan:     De Quervain's tenosynovitis, left    Ganglion cyst of dorsum of right wrist        Follow up for schedule cyst removal.      Right wrist ganglion cyst, plan is for surgical incision.  Risks and benefits were discussed to include possible need for additional surgery or return of the cyst.  Patient is left handed, will do the ganglion excision 1st.  If her de Quervain continues to bother her will do the de Quervain released afterwards.  Will see her back postoperatively.    @ERI@  DAPHNIE Harper PA-C    This note was created using [employer] voice recognition software that occasionally misinterprets words or phrases.

## 2020-11-12 NOTE — PROGRESS NOTES
"Subjective:       Patient ID: Gi Abdullahi is a 50 y.o. female.    Vitals:  height is 5' 5" (1.651 m) and weight is 102.5 kg (226 lb). Her oral temperature is 98 °F (36.7 °C). Her blood pressure is 120/70 and her pulse is 60. Her oxygen saturation is 99%.     Chief Complaint: Tinnitus    Ringing in Ears:   Chronicity:  New  Onset:  1 to 4 weeks ago  Progression since onset:  Gradually worsening  Severity:  Severe  Duration:  Off/on all day  Ringing in ear characteristics:  Trouble hearing TV, muffled, severe and worse background noise   Associated symptoms: dizziness, headaches, tinnitus and buzzing.  No ear pain and no fever.   PMH includes: dizziness.        Constitution: Negative for appetite change, chills and fever.   HENT: Positive for tinnitus. Negative for ear pain, congestion and sore throat.    Neck: Negative for painful lymph nodes.   Eyes: Negative for eye discharge and eye redness.   Respiratory: Negative for cough.    Gastrointestinal: Negative for vomiting and diarrhea.   Genitourinary: Negative for dysuria.   Musculoskeletal: Negative for muscle ache.   Skin: Negative for rash and erythema.   Neurological: Positive for dizziness and headaches. Negative for seizures.   Hematologic/Lymphatic: Negative for swollen lymph nodes.       Objective:      Physical Exam   Constitutional: She is oriented to person, place, and time. She appears well-developed.   HENT:   Head: Normocephalic and atraumatic. Head is without abrasion, without contusion and without laceration.   Ears:   Right Ear: External ear normal. A middle ear effusion is present.   Left Ear: External ear normal. A middle ear effusion is present.   Nose: Nose normal.   Mouth/Throat: Oropharynx is clear and moist and mucous membranes are normal.   Eyes: Pupils are equal, round, and reactive to light. Conjunctivae, EOM and lids are normal.   Neck: Trachea normal, full passive range of motion without pain and phonation normal. Neck supple. "   Cardiovascular: Normal rate, regular rhythm and normal heart sounds.   Pulmonary/Chest: Effort normal and breath sounds normal. No stridor. No respiratory distress.   Musculoskeletal: Normal range of motion.   Neurological: She is alert and oriented to person, place, and time.   Skin: Skin is warm, dry, intact and no rash. Capillary refill takes less than 2 seconds. abrasion, burn, bruising, erythema and ecchymosisPsychiatric: Her speech is normal and behavior is normal. Judgment and thought content normal.   Nursing note and vitals reviewed.        Assessment:       1. Tinnitus of both ears    2. OME (otitis media with effusion), bilateral        Plan:         Tinnitus of both ears  -     Ambulatory referral/consult to ENT    OME (otitis media with effusion), bilateral    Other orders  -     dexamethasone injection 8 mg  -     cetirizine (ZYRTEC) 10 MG tablet; Take 1 tablet (10 mg total) by mouth once daily.  Dispense: 30 tablet; Refill: 2  -     fluticasone propionate (FLONASE) 50 mcg/actuation nasal spray; 2 sprays (100 mcg total) by Each Nostril route 2 (two) times daily.  Dispense: 15.8 mL; Refill: 0  -     meclizine (ANTIVERT) 25 mg tablet; Take 1 tablet (25 mg total) by mouth 3 (three) times daily as needed.  Dispense: 30 tablet; Refill: 0

## 2020-11-16 DIAGNOSIS — M67.431 GANGLION CYST OF WRIST, RIGHT: ICD-10-CM

## 2020-11-16 DIAGNOSIS — M67.431 GANGLION CYST OF DORSUM OF RIGHT WRIST: Primary | ICD-10-CM

## 2020-11-16 DIAGNOSIS — Z01.818 PREOP EXAMINATION: ICD-10-CM

## 2020-11-23 ENCOUNTER — HOSPITAL ENCOUNTER (OUTPATIENT)
Dept: RADIOLOGY | Facility: HOSPITAL | Age: 50
Discharge: HOME | End: 2020-11-23
Attending: ORTHOPAEDIC SURGERY
Payer: COMMERCIAL

## 2020-11-23 ENCOUNTER — HOSPITAL ENCOUNTER (OUTPATIENT)
Dept: PREADMISSION TESTING | Facility: HOSPITAL | Age: 50
Discharge: HOME | End: 2020-11-23
Attending: ORTHOPAEDIC SURGERY
Payer: COMMERCIAL

## 2020-11-23 ENCOUNTER — LAB VISIT (OUTPATIENT)
Dept: LAB | Facility: HOSPITAL | Age: 50
End: 2020-11-23
Attending: ORTHOPAEDIC SURGERY
Payer: COMMERCIAL

## 2020-11-23 VITALS
DIASTOLIC BLOOD PRESSURE: 72 MMHG | OXYGEN SATURATION: 97 % | SYSTOLIC BLOOD PRESSURE: 107 MMHG | RESPIRATION RATE: 18 BRPM | BODY MASS INDEX: 38.38 KG/M2 | HEART RATE: 78 BPM | HEIGHT: 65 IN | WEIGHT: 230.38 LBS | TEMPERATURE: 98 F

## 2020-11-23 DIAGNOSIS — M67.431 GANGLION CYST OF DORSUM OF RIGHT WRIST: ICD-10-CM

## 2020-11-23 LAB
ALBUMIN SERPL BCP-MCNC: 3.6 G/DL (ref 3.5–5.2)
ALP SERPL-CCNC: 124 U/L (ref 55–135)
ALT SERPL W/O P-5'-P-CCNC: 91 U/L (ref 10–44)
ANION GAP SERPL CALC-SCNC: 11 MMOL/L (ref 8–16)
AST SERPL-CCNC: 26 U/L (ref 10–40)
BASOPHILS # BLD AUTO: 0.07 K/UL (ref 0–0.2)
BASOPHILS NFR BLD: 0.7 % (ref 0–1.9)
BILIRUB SERPL-MCNC: 0.3 MG/DL (ref 0.1–1)
BUN SERPL-MCNC: 9 MG/DL (ref 6–20)
CALCIUM SERPL-MCNC: 10 MG/DL (ref 8.7–10.5)
CHLORIDE SERPL-SCNC: 99 MMOL/L (ref 95–110)
CO2 SERPL-SCNC: 27 MMOL/L (ref 23–29)
CREAT SERPL-MCNC: 0.7 MG/DL (ref 0.5–1.4)
DIFFERENTIAL METHOD: ABNORMAL
EOSINOPHIL # BLD AUTO: 0.2 K/UL (ref 0–0.5)
EOSINOPHIL NFR BLD: 1.6 % (ref 0–8)
ERYTHROCYTE [DISTWIDTH] IN BLOOD BY AUTOMATED COUNT: 15.4 % (ref 11.5–14.5)
EST. GFR  (AFRICAN AMERICAN): >60 ML/MIN/1.73 M^2
EST. GFR  (NON AFRICAN AMERICAN): >60 ML/MIN/1.73 M^2
GLUCOSE SERPL-MCNC: 93 MG/DL (ref 70–110)
HCT VFR BLD AUTO: 43.8 % (ref 37–48.5)
HGB BLD-MCNC: 13.4 G/DL (ref 12–16)
IMM GRANULOCYTES # BLD AUTO: 0.1 K/UL (ref 0–0.04)
IMM GRANULOCYTES NFR BLD AUTO: 1 % (ref 0–0.5)
LYMPHOCYTES # BLD AUTO: 3.2 K/UL (ref 1–4.8)
LYMPHOCYTES NFR BLD: 32.2 % (ref 18–48)
MCH RBC QN AUTO: 28.3 PG (ref 27–31)
MCHC RBC AUTO-ENTMCNC: 30.6 G/DL (ref 32–36)
MCV RBC AUTO: 92 FL (ref 82–98)
MONOCYTES # BLD AUTO: 0.6 K/UL (ref 0.3–1)
MONOCYTES NFR BLD: 6.1 % (ref 4–15)
NEUTROPHILS # BLD AUTO: 5.7 K/UL (ref 1.8–7.7)
NEUTROPHILS NFR BLD: 58.4 % (ref 38–73)
NRBC BLD-RTO: 0 /100 WBC
PLATELET # BLD AUTO: 402 K/UL (ref 150–350)
PMV BLD AUTO: 9.6 FL (ref 9.2–12.9)
POTASSIUM SERPL-SCNC: 3.6 MMOL/L (ref 3.5–5.1)
PROT SERPL-MCNC: 7.2 G/DL (ref 6–8.4)
RBC # BLD AUTO: 4.74 M/UL (ref 4–5.4)
SODIUM SERPL-SCNC: 137 MMOL/L (ref 136–145)
WBC # BLD AUTO: 9.84 K/UL (ref 3.9–12.7)

## 2020-11-23 PROCEDURE — 93010 ELECTROCARDIOGRAM REPORT: CPT | Mod: ,,, | Performed by: SPECIALIST

## 2020-11-23 PROCEDURE — 93005 ELECTROCARDIOGRAM TRACING: CPT | Performed by: SPECIALIST

## 2020-11-23 PROCEDURE — 36415 COLL VENOUS BLD VENIPUNCTURE: CPT

## 2020-11-23 PROCEDURE — 80053 COMPREHEN METABOLIC PANEL: CPT

## 2020-11-23 PROCEDURE — 93010 EKG 12-LEAD: ICD-10-PCS | Mod: ,,, | Performed by: SPECIALIST

## 2020-11-23 PROCEDURE — 85025 COMPLETE CBC W/AUTO DIFF WBC: CPT

## 2020-11-23 PROCEDURE — 71046 X-RAY EXAM CHEST 2 VIEWS: CPT | Mod: TC

## 2020-11-23 NOTE — DISCHARGE INSTRUCTIONS
To confirm, Your doctor has instructed you that surgery is scheduled for:   Wednesday, December 2, 2020    Pre-Op will call the afternoon prior to surgery between 4:00 and 6:00 PM with the final arrival time.  Tuesday, December 1, 2020      Please report to [facility] via [facility] entrance.    Do not eat or drink anything after midnight the night before your surgery - THIS INCLUDES  WATER, GUM, MINTS AND CANDY.  YOU MAY BRUSH YOUR TEETH BUT DO NOT SWALLOW     TAKE MEDICATIONS WITH A SMALL SIP OF WATER THE MORNING OF YOUR PROCEDURE:      PLEASE NOTE:  The surgery schedule has many variables which may affect the time of your surgery case.  Family members should be available if your surgery time changes.  Plan to be here the day of your procedure between 4-6 hours.      DO NOT TAKE THESE MEDICATIONS 5-7 DAYS PRIOR to your procedure or per your surgeon's request: ASPIRIN, ALEVE, ADVIL, IBUPROFEN,  CORY SELTZER, BC , FISH OIL , VITAMIN E, HERBALS  (May take Tylenol)                                                          IMPORTANT INSTRUCTIONS      · Do not smoke, vape or drink alcoholic beverages 24 hours prior to your procedure.  · Shower the night before AND the morning of your procedure with a Chlorhexidine wash such as Hibiclens or Dial antibacterial soap from the neck down.   ·  Do not get it on your face or in your eyes.  You may use your own shampoo and face wash. This helps your skin to be as bacteria free as possible.   · Sleep in a bed with clean sheets.  Do not sleep with a pet in the bed.   · Please leave all jewelry, piercing's and valuables at home.     · Make arrangements in advance for transportation home by a responsible adult.      · You must make arrangements for transportation, TAXI'S, UBER'S OR LYFTS ARE NOT ALLOWED.        If you have any questions about these instructions, call Pre-Op Admit  Nursing at [phone] or the Pre-Op Day Surgery Unit at [phone].

## 2020-11-29 ENCOUNTER — LAB VISIT (OUTPATIENT)
Dept: PRIMARY CARE CLINIC | Facility: CLINIC | Age: 50
End: 2020-11-29
Payer: COMMERCIAL

## 2020-11-29 DIAGNOSIS — Z01.818 PREOP EXAMINATION: ICD-10-CM

## 2020-11-29 PROCEDURE — U0003 INFECTIOUS AGENT DETECTION BY NUCLEIC ACID (DNA OR RNA); SEVERE ACUTE RESPIRATORY SYNDROME CORONAVIRUS 2 (SARS-COV-2) (CORONAVIRUS DISEASE [COVID-19]), AMPLIFIED PROBE TECHNIQUE, MAKING USE OF HIGH THROUGHPUT TECHNOLOGIES AS DESCRIBED BY CMS-2020-01-R: HCPCS

## 2020-11-30 LAB — SARS-COV-2 RNA RESP QL NAA+PROBE: NOT DETECTED

## 2020-12-02 ENCOUNTER — ANESTHESIA (OUTPATIENT)
Dept: SURGERY | Facility: HOSPITAL | Age: 50
End: 2020-12-02
Payer: COMMERCIAL

## 2020-12-02 ENCOUNTER — HOSPITAL ENCOUNTER (OUTPATIENT)
Facility: HOSPITAL | Age: 50
Discharge: HOME | End: 2020-12-02
Attending: ORTHOPAEDIC SURGERY | Admitting: ORTHOPAEDIC SURGERY
Payer: COMMERCIAL

## 2020-12-02 ENCOUNTER — ANESTHESIA EVENT (OUTPATIENT)
Dept: SURGERY | Facility: HOSPITAL | Age: 50
End: 2020-12-02
Payer: COMMERCIAL

## 2020-12-02 VITALS
DIASTOLIC BLOOD PRESSURE: 64 MMHG | HEIGHT: 65 IN | TEMPERATURE: 98 F | RESPIRATION RATE: 18 BRPM | WEIGHT: 230.38 LBS | HEART RATE: 59 BPM | OXYGEN SATURATION: 100 % | SYSTOLIC BLOOD PRESSURE: 102 MMHG | BODY MASS INDEX: 38.38 KG/M2

## 2020-12-02 DIAGNOSIS — M67.431 GANGLION CYST OF DORSUM OF RIGHT WRIST: ICD-10-CM

## 2020-12-02 DIAGNOSIS — M67.431 GANGLION CYST OF WRIST, RIGHT: Primary | ICD-10-CM

## 2020-12-02 PROCEDURE — 25000003 PHARM REV CODE 250: Performed by: STUDENT IN AN ORGANIZED HEALTH CARE EDUCATION/TRAINING PROGRAM

## 2020-12-02 PROCEDURE — 27000284 HC CANNULA NASAL: Performed by: STUDENT IN AN ORGANIZED HEALTH CARE EDUCATION/TRAINING PROGRAM

## 2020-12-02 PROCEDURE — 63600175 PHARM REV CODE 636 W HCPCS: Performed by: STUDENT IN AN ORGANIZED HEALTH CARE EDUCATION/TRAINING PROGRAM

## 2020-12-02 PROCEDURE — 36000706: Performed by: ORTHOPAEDIC SURGERY

## 2020-12-02 PROCEDURE — 71000033 HC RECOVERY, INTIAL HOUR: Performed by: ORTHOPAEDIC SURGERY

## 2020-12-02 PROCEDURE — 27000673 HC TUBING BLOOD Y: Performed by: STUDENT IN AN ORGANIZED HEALTH CARE EDUCATION/TRAINING PROGRAM

## 2020-12-02 PROCEDURE — 37000008 HC ANESTHESIA 1ST 15 MINUTES: Performed by: ORTHOPAEDIC SURGERY

## 2020-12-02 PROCEDURE — 71000015 HC POSTOP RECOV 1ST HR: Performed by: ORTHOPAEDIC SURGERY

## 2020-12-02 PROCEDURE — 63600175 PHARM REV CODE 636 W HCPCS: Performed by: NURSE ANESTHETIST, CERTIFIED REGISTERED

## 2020-12-02 PROCEDURE — 63600175 PHARM REV CODE 636 W HCPCS: Performed by: ORTHOPAEDIC SURGERY

## 2020-12-02 PROCEDURE — 37000009 HC ANESTHESIA EA ADD 15 MINS: Performed by: ORTHOPAEDIC SURGERY

## 2020-12-02 PROCEDURE — 25000003 PHARM REV CODE 250

## 2020-12-02 PROCEDURE — 25000003 PHARM REV CODE 250: Performed by: NURSE ANESTHETIST, CERTIFIED REGISTERED

## 2020-12-02 PROCEDURE — 27200651 HC AIRWAY, LMA: Performed by: STUDENT IN AN ORGANIZED HEALTH CARE EDUCATION/TRAINING PROGRAM

## 2020-12-02 PROCEDURE — 27201423 OPTIME MED/SURG SUP & DEVICES STERILE SUPPLY: Performed by: ORTHOPAEDIC SURGERY

## 2020-12-02 PROCEDURE — 27000654 HC CATH IV JELCO: Performed by: STUDENT IN AN ORGANIZED HEALTH CARE EDUCATION/TRAINING PROGRAM

## 2020-12-02 PROCEDURE — 36000707: Performed by: ORTHOPAEDIC SURGERY

## 2020-12-02 RX ORDER — CELECOXIB 100 MG/1
CAPSULE ORAL
Status: COMPLETED
Start: 2020-12-02 | End: 2020-12-02

## 2020-12-02 RX ORDER — LIDOCAINE HYDROCHLORIDE 20 MG/ML
INJECTION, SOLUTION EPIDURAL; INFILTRATION; INTRACAUDAL; PERINEURAL
Status: DISCONTINUED | OUTPATIENT
Start: 2020-12-02 | End: 2020-12-02

## 2020-12-02 RX ORDER — ONDANSETRON 2 MG/ML
4 INJECTION INTRAMUSCULAR; INTRAVENOUS DAILY PRN
Status: DISCONTINUED | OUTPATIENT
Start: 2020-12-02 | End: 2020-12-02 | Stop reason: HOSPADM

## 2020-12-02 RX ORDER — OXYCODONE HYDROCHLORIDE 5 MG/1
5 TABLET ORAL
Status: DISCONTINUED | OUTPATIENT
Start: 2020-12-02 | End: 2020-12-02 | Stop reason: HOSPADM

## 2020-12-02 RX ORDER — MEPERIDINE HYDROCHLORIDE 50 MG/ML
12.5 INJECTION INTRAMUSCULAR; INTRAVENOUS; SUBCUTANEOUS EVERY 10 MIN PRN
Status: DISCONTINUED | OUTPATIENT
Start: 2020-12-02 | End: 2020-12-02 | Stop reason: HOSPADM

## 2020-12-02 RX ORDER — ACETAMINOPHEN 500 MG
1000 TABLET ORAL ONCE
Status: COMPLETED | OUTPATIENT
Start: 2020-12-02 | End: 2020-12-02

## 2020-12-02 RX ORDER — PROPOFOL 10 MG/ML
VIAL (ML) INTRAVENOUS
Status: DISCONTINUED | OUTPATIENT
Start: 2020-12-02 | End: 2020-12-02

## 2020-12-02 RX ORDER — CEFAZOLIN SODIUM 2 G/50ML
2 SOLUTION INTRAVENOUS
Status: COMPLETED | OUTPATIENT
Start: 2020-12-02 | End: 2020-12-02

## 2020-12-02 RX ORDER — DEXAMETHASONE SODIUM PHOSPHATE 4 MG/ML
INJECTION, SOLUTION INTRA-ARTICULAR; INTRALESIONAL; INTRAMUSCULAR; INTRAVENOUS; SOFT TISSUE
Status: DISCONTINUED | OUTPATIENT
Start: 2020-12-02 | End: 2020-12-02

## 2020-12-02 RX ORDER — SODIUM CHLORIDE, SODIUM LACTATE, POTASSIUM CHLORIDE, CALCIUM CHLORIDE 600; 310; 30; 20 MG/100ML; MG/100ML; MG/100ML; MG/100ML
INJECTION, SOLUTION INTRAVENOUS CONTINUOUS PRN
Status: DISCONTINUED | OUTPATIENT
Start: 2020-12-02 | End: 2020-12-02

## 2020-12-02 RX ORDER — DIPHENHYDRAMINE HYDROCHLORIDE 50 MG/ML
12.5 INJECTION INTRAMUSCULAR; INTRAVENOUS
Status: DISCONTINUED | OUTPATIENT
Start: 2020-12-02 | End: 2020-12-02 | Stop reason: HOSPADM

## 2020-12-02 RX ORDER — FENTANYL CITRATE 50 UG/ML
INJECTION, SOLUTION INTRAMUSCULAR; INTRAVENOUS
Status: DISCONTINUED | OUTPATIENT
Start: 2020-12-02 | End: 2020-12-02

## 2020-12-02 RX ORDER — SODIUM CHLORIDE 0.9 % (FLUSH) 0.9 %
10 SYRINGE (ML) INJECTION
Status: DISCONTINUED | OUTPATIENT
Start: 2020-12-02 | End: 2020-12-02 | Stop reason: HOSPADM

## 2020-12-02 RX ORDER — FAMOTIDINE 10 MG/ML
INJECTION INTRAVENOUS
Status: DISCONTINUED | OUTPATIENT
Start: 2020-12-02 | End: 2020-12-02

## 2020-12-02 RX ORDER — MIDAZOLAM HYDROCHLORIDE 1 MG/ML
INJECTION INTRAMUSCULAR; INTRAVENOUS
Status: DISCONTINUED | OUTPATIENT
Start: 2020-12-02 | End: 2020-12-02

## 2020-12-02 RX ORDER — HYDROMORPHONE HYDROCHLORIDE 1 MG/ML
0.2 INJECTION, SOLUTION INTRAMUSCULAR; INTRAVENOUS; SUBCUTANEOUS
Status: DISCONTINUED | OUTPATIENT
Start: 2020-12-02 | End: 2020-12-02 | Stop reason: HOSPADM

## 2020-12-02 RX ORDER — CELECOXIB 100 MG/1
200 CAPSULE ORAL
Status: COMPLETED | OUTPATIENT
Start: 2020-12-02 | End: 2020-12-02

## 2020-12-02 RX ORDER — ACETAMINOPHEN 500 MG
TABLET ORAL
Status: COMPLETED
Start: 2020-12-02 | End: 2020-12-02

## 2020-12-02 RX ORDER — OXYCODONE AND ACETAMINOPHEN 7.5; 325 MG/1; MG/1
1 TABLET ORAL EVERY 4 HOURS PRN
Qty: 14 TABLET | Refills: 0 | Status: SHIPPED | OUTPATIENT
Start: 2020-12-02 | End: 2020-12-08

## 2020-12-02 RX ORDER — ONDANSETRON 2 MG/ML
INJECTION INTRAMUSCULAR; INTRAVENOUS
Status: DISCONTINUED | OUTPATIENT
Start: 2020-12-02 | End: 2020-12-02

## 2020-12-02 RX ADMIN — FAMOTIDINE 20 MG: 10 INJECTION, SOLUTION INTRAVENOUS at 08:12

## 2020-12-02 RX ADMIN — SODIUM CHLORIDE, SODIUM LACTATE, POTASSIUM CHLORIDE, AND CALCIUM CHLORIDE: .6; .31; .03; .02 INJECTION, SOLUTION INTRAVENOUS at 08:12

## 2020-12-02 RX ADMIN — DEXAMETHASONE SODIUM PHOSPHATE 8 MG: 4 INJECTION, SOLUTION INTRAMUSCULAR; INTRAVENOUS at 08:12

## 2020-12-02 RX ADMIN — ACETAMINOPHEN 1000 MG: 500 TABLET, FILM COATED ORAL at 07:12

## 2020-12-02 RX ADMIN — LIDOCAINE HYDROCHLORIDE 50 MG: 20 INJECTION, SOLUTION INTRAVENOUS at 08:12

## 2020-12-02 RX ADMIN — PROPOFOL 140 MG: 10 INJECTION, EMULSION INTRAVENOUS at 08:12

## 2020-12-02 RX ADMIN — CELECOXIB 200 MG: 100 CAPSULE ORAL at 07:12

## 2020-12-02 RX ADMIN — HYDROMORPHONE HYDROCHLORIDE 0.2 MG: 1 INJECTION, SOLUTION INTRAMUSCULAR; INTRAVENOUS; SUBCUTANEOUS at 09:12

## 2020-12-02 RX ADMIN — Medication 1000 MG: at 07:12

## 2020-12-02 RX ADMIN — FENTANYL CITRATE 50 MCG: 50 INJECTION INTRAMUSCULAR; INTRAVENOUS at 08:12

## 2020-12-02 RX ADMIN — MIDAZOLAM HYDROCHLORIDE 1 MG: 1 INJECTION, SOLUTION INTRAMUSCULAR; INTRAVENOUS at 08:12

## 2020-12-02 RX ADMIN — CEFAZOLIN SODIUM 2 G: 2 SOLUTION INTRAVENOUS at 08:12

## 2020-12-02 RX ADMIN — OXYCODONE HYDROCHLORIDE 5 MG: 5 TABLET ORAL at 09:12

## 2020-12-02 RX ADMIN — ONDANSETRON 4 MG: 2 INJECTION INTRAMUSCULAR; INTRAVENOUS at 08:12

## 2020-12-02 NOTE — TRANSFER OF CARE
"Anesthesia Transfer of Care Note    Patient: Gi Abdullahi    Procedure(s) Performed: Procedure(s) (LRB):  EXCISION, CYST (Right)    Patient location: PACU    Anesthesia Type: general    Transport from OR: Transported from OR on room air with adequate spontaneous ventilation    Post pain: adequate analgesia    Post assessment: no apparent anesthetic complications    Post vital signs: stable    Level of consciousness: awake    Nausea/Vomiting: no nausea/vomiting    Complications: none    Transfer of care protocol was followed      Last vitals:   Visit Vitals  /79 (BP Location: Left arm, Patient Position: Lying)   Pulse 76   Temp 36.7 °C (98 °F) (Oral)   Resp 16   Ht 5' 5" (1.651 m)   Wt 104.5 kg (230 lb 6.1 oz)   SpO2 98%   Breastfeeding No   BMI 38.34 kg/m²     "

## 2020-12-02 NOTE — ANESTHESIA POSTPROCEDURE EVALUATION
Anesthesia Post Evaluation    Patient: Gi Abdullahi    Procedure(s) Performed: Procedure(s) (LRB):  EXCISION, CYST (Right)    Final Anesthesia Type: general    Patient location during evaluation: PACU  Patient participation: Yes- Able to Participate  Level of consciousness: awake and alert  Post-procedure vital signs: reviewed and stable  Pain management: adequate  Airway patency: patent  POORNIMA mitigation strategies: Multimodal analgesia and Extubation while patient is awake  PONV status at discharge: No PONV  Anesthetic complications: no      Cardiovascular status: blood pressure returned to baseline  Respiratory status: unassisted  Hydration status: euvolemic  Follow-up not needed.          Vitals Value Taken Time   /64 12/02/20 1030   Temp 36.5 °C (97.7 °F) 12/02/20 1000   Pulse 59 12/02/20 1030   Resp 18 12/02/20 1030   SpO2 100 % 12/02/20 1030         Event Time   Out of Recovery 09:54:12         Pain/Yahir Score: Pain Rating Prior to Med Admin: 8 (12/2/2020  9:31 AM)  Yahir Score: 10 (12/2/2020 10:40 AM)

## 2020-12-02 NOTE — ANESTHESIA PREPROCEDURE EVALUATION
12/02/2020  Gi Abdullahi is a 50 y.o., female   Patient Active Problem List   Diagnosis    Mild episode of recurrent major depressive disorder    Calculus of gallbladder with acute cholecystitis without obstruction    Nicotine dependence with current use    Morbidly obese    Hypophosphatemia    Breast mass, right    Ganglion of right wrist    Ganglion cyst of wrist, right       Past Surgical History:   Procedure Laterality Date    BREAST BIOPSY Right 11/8/2019    Procedure: BIOPSY, BREAST;  Surgeon: Roni Arias MD;  Location: [facility] OR;  Service: General;  Laterality: Right;    CERVIX REMOVAL      HEMORRHOID SURGERY  2018    Dr Quintero    HYSTERECTOMY  1995    LAPAROSCOPIC CHOLECYSTECTOMY N/A 9/3/2018    Procedure: CHOLECYSTECTOMY, LAPAROSCOPIC;  Surgeon: Wilmer Degroot MD;  Location: [facility] OR;  Service: General;  Laterality: N/A;        Tobacco Use:  The patient  reports that she has been smoking. She has a 30.00 pack-year smoking history. She has never used smokeless tobacco.     Results for orders placed or performed during the hospital encounter of 11/23/20   EKG 12-lead    Collection Time: 11/23/20  1:46 PM    Narrative    Test Reason : M67.431,    Vent. Rate : 079 BPM     Atrial Rate : 079 BPM     P-R Int : 170 ms          QRS Dur : 088 ms      QT Int : 384 ms       P-R-T Axes : 062 063 064 degrees     QTc Int : 440 ms    Normal sinus rhythm  Normal ECG  When compared with ECG of 02-APR-2020 13:10,  No significant change was found  Confirmed by Jonah MATHEWS, Blane STERN ([id number]) on 11/24/2020 7:26:45 PM    Referred By:  FINGER           Confirmed By:Blane Mckenzie MD             Lab Results   Component Value Date    WBC 9.84 11/23/2020    HGB 13.4 11/23/2020    HCT 43.8 11/23/2020    MCV 92 11/23/2020     (H) 11/23/2020     BMP  Lab Results   Component Value Date      11/23/2020    K 3.6 11/23/2020    CL 99 11/23/2020    CO2 27 11/23/2020    BUN 9 11/23/2020    CREATININE 0.7 11/23/2020    CALCIUM 10.0 11/23/2020    ANIONGAP 11 11/23/2020    ESTGFRAFRICA >60.0 11/23/2020    EGFRNONAA >60.0 11/23/2020             Anesthesia Evaluation    I have reviewed the Patient Summary Reports.    I have reviewed the Nursing Notes. I have reviewed the NPO Status.   I have reviewed the Medications.     Review of Systems  Anesthesia Hx:  No problems with previous Anesthesia  Denies Family Hx of Anesthesia complications.   Denies Personal Hx of Anesthesia complications.   Social:  Smoker, Social Alcohol Use    Hematology/Oncology:  Hematology Normal       -- Cancer in past history:  Other (see Oncology comments) Oncology Comments: Cervical cancer, treated with surgery, no chemo     EENT/Dental:EENT/Dental Normal   Cardiovascular:  Cardiovascular Normal Exercise tolerance: good   Functional Capacity good / => 4 METS    Pulmonary:  Pulmonary Normal    Hepatic/GI:  Hepatic/GI Normal    Musculoskeletal:   Arthritis     Neurological:  Neurology Normal    Endocrine:  Endocrine Normal    Psych:   depression          Physical Exam  General:  Well nourished    Airway/Jaw/Neck:  Airway Findings: Mouth Opening: Normal Mallampati: II  TM Distance: Normal, at least 6 cm  Jaw/Neck Findings:  Neck ROM: Normal ROM      Dental:  Dental Findings: In tact   Chest/Lungs:  Chest/Lungs Findings: Clear to auscultation, Normal Respiratory Rate     Heart/Vascular:  Heart Findings: Rate: Normal  Rhythm: Regular Rhythm  Sounds: Normal        Mental Status:  Mental Status Findings:  Cooperative, Alert and Oriented         Anesthesia Plan  Type of Anesthesia, risks & benefits discussed:  Anesthesia Type:  general  Patient's Preference:   Intra-op Monitoring Plan: standard ASA monitors  Intra-op Monitoring Plan Comments:   Post Op Pain Control Plan: multimodal analgesia  Post Op Pain Control Plan Comments:   Induction:    IV  Beta Blocker:         Informed Consent: Patient understands risks and agrees with Anesthesia plan.  Questions answered. Anesthesia consent signed with patient.  ASA Score: 2     Day of Surgery Review of History & Physical:  There are no significant changes.      Anesthesia Plan Notes: General; LMA  Acetaminophen 1000mg PO, celebrex 200mg  PONV prophylaxis: zofran 4mg, decadron 8mg        Ready For Surgery From Anesthesia Perspective.

## 2020-12-02 NOTE — INTERVAL H&P NOTE
The patient has been examined and the H&P has been reviewed:    I concur with the findings and no changes have occurred since H&P was written.    Surgery risks, benefits and alternative options discussed and understood by patient/family.          Active Hospital Problems    Diagnosis  POA    Ganglion cyst of wrist, right [M67.431]  Yes      Resolved Hospital Problems   No resolved problems to display.

## 2020-12-02 NOTE — OP NOTE
[facility]  Surgery Department  Operative Note    SUMMARY     Date of Procedure: 12/2/2020     Procedure:  Excision of ganglion cyst right wrist    Surgeon(s) and Role:     * Adis Mccann MD - Primary    Assisting Surgeon:  Jonathan    Pre-Operative Diagnosis: Ganglion cyst of dorsum of right wrist [M67.431]    Post-Operative Diagnosis: Post-Op Diagnosis Codes:     * Ganglion cyst of dorsum of right wrist [M67.431]    Anesthesia: General    Description of the Findings of the Procedure:  Patient was placed on the operating table in supine position.  The right upper extremity was prepped and draped in the usual sterile manner for surgery.  A linear incision was made directly over the cyst.  It was carried down sharply through the skin.  The cyst was identified and meticulously dissected out.  It dissected down deep to the extensor retinaculum in all the way down to the carpal bones.  It appeared to be coming from the scapholunate joint.  It was removed in its entirety and sent for pathology.  We copiously irrigated.  We closed the subcu with 3-0 Vicryl and the skin with 4-0 Monocryl.  Sterile dressings were applied and the patient was noted to be in stable condition    Complications: No    Estimated Blood Loss (EBL): * No values recorded between 12/2/2020  8:38 AM and 12/2/2020  8:47 AM *           Implants: * No implants in log *    Specimens:   Specimen (12h ago, onward)     Start     Ordered    12/02/20 0842  Specimen to Pathology - Surgery  Once     Comments: Pre-op Diagnosis: Ganglion cyst of dorsum of right wrist [M67.431]Procedure(s):EXCISION, CYST Number of specimens: 1Name of specimens: 1. Right wrist ganglion cyst - permanent      12/02/20 0846                        Condition: Good    Disposition: PACU - hemodynamically stable.                 Discharge Note    SUMMARY     Admit Date: 12/2/2020    Discharge Date and Time:  12/02/2020 8:47 AM    Hospital Course (synopsis of major diagnoses,  care, treatment, and services provided during the course of the hospital stay): Uneventful      Final Diagnosis: Post-Op Diagnosis Codes:     * Ganglion cyst of dorsum of right wrist [M67.431]    Disposition: Home or Self Care    Follow Up/Patient Instructions:     Medications:  Reconciled Home Medications:   Current Discharge Medication List      CONTINUE these medications which have NOT CHANGED    Details   FLUoxetine 20 MG capsule Take 3 capsules (60 mg total) by mouth once daily.  Qty: 270 capsule, Refills: 1    Associated Diagnoses: Hot flashes; Anxiety and depression           Discharge Procedure Orders   Diet Adult Regular     Leave dressing on - Keep it clean, dry, and intact until clinic visit     Activity as tolerated     Follow-up Information     Please follow up.    Why: For wound re-check, For suture removal

## 2020-12-08 ENCOUNTER — OFFICE VISIT (OUTPATIENT)
Dept: FAMILY MEDICINE | Facility: CLINIC | Age: 50
End: 2020-12-08
Payer: COMMERCIAL

## 2020-12-08 VITALS
BODY MASS INDEX: 38.19 KG/M2 | HEART RATE: 82 BPM | WEIGHT: 229.19 LBS | DIASTOLIC BLOOD PRESSURE: 76 MMHG | OXYGEN SATURATION: 97 % | HEIGHT: 65 IN | TEMPERATURE: 98 F | SYSTOLIC BLOOD PRESSURE: 114 MMHG

## 2020-12-08 DIAGNOSIS — F32.A DEPRESSION, UNSPECIFIED DEPRESSION TYPE: Primary | ICD-10-CM

## 2020-12-08 DIAGNOSIS — F41.9 ANXIETY AND DEPRESSION: ICD-10-CM

## 2020-12-08 DIAGNOSIS — Z00.00 HEALTH CARE MAINTENANCE: ICD-10-CM

## 2020-12-08 DIAGNOSIS — R25.2 LEG CRAMPING: ICD-10-CM

## 2020-12-08 DIAGNOSIS — Z11.4 ENCOUNTER FOR SCREENING FOR HIV: ICD-10-CM

## 2020-12-08 DIAGNOSIS — E66.09 CLASS 2 OBESITY DUE TO EXCESS CALORIES WITHOUT SERIOUS COMORBIDITY WITH BODY MASS INDEX (BMI) OF 38.0 TO 38.9 IN ADULT: ICD-10-CM

## 2020-12-08 DIAGNOSIS — F32.A ANXIETY AND DEPRESSION: ICD-10-CM

## 2020-12-08 DIAGNOSIS — H93.19 TINNITUS, UNSPECIFIED LATERALITY: ICD-10-CM

## 2020-12-08 DIAGNOSIS — Z11.59 NEED FOR HEPATITIS C SCREENING TEST: ICD-10-CM

## 2020-12-08 PROBLEM — K64.4 EXTERNAL HEMORRHOIDS: Status: ACTIVE | Noted: 2020-12-08

## 2020-12-08 PROBLEM — J30.9 ALLERGIC RHINITIS: Status: ACTIVE | Noted: 2020-12-08

## 2020-12-08 PROBLEM — E66.812 CLASS 2 OBESITY DUE TO EXCESS CALORIES WITHOUT SERIOUS COMORBIDITY WITH BODY MASS INDEX (BMI) OF 38.0 TO 38.9 IN ADULT: Status: ACTIVE | Noted: 2018-09-03

## 2020-12-08 PROBLEM — Z90.710 STATUS POST HYSTERECTOMY: Status: ACTIVE | Noted: 2020-12-08

## 2020-12-08 PROBLEM — E66.9 OBESITY WITH BODY MASS INDEX 30 OR GREATER: Status: ACTIVE | Noted: 2018-09-03

## 2020-12-08 PROBLEM — K80.00 CALCULUS OF GALLBLADDER WITH ACUTE CHOLECYSTITIS WITHOUT OBSTRUCTION: Status: RESOLVED | Noted: 2018-09-02 | Resolved: 2020-12-08

## 2020-12-08 PROBLEM — G47.00 INSOMNIA: Status: ACTIVE | Noted: 2020-12-08

## 2020-12-08 PROBLEM — M67.431 GANGLION CYST OF WRIST, RIGHT: Status: RESOLVED | Noted: 2020-11-16 | Resolved: 2020-12-08

## 2020-12-08 PROCEDURE — 3008F BODY MASS INDEX DOCD: CPT | Mod: S$GLB,,, | Performed by: FAMILY MEDICINE

## 2020-12-08 PROCEDURE — 1126F PR PAIN SEVERITY QUANTIFIED, NO PAIN PRESENT: ICD-10-PCS | Mod: S$GLB,,, | Performed by: FAMILY MEDICINE

## 2020-12-08 PROCEDURE — 99204 PR OFFICE/OUTPT VISIT, NEW, LEVL IV, 45-59 MIN: ICD-10-PCS | Mod: S$GLB,,, | Performed by: FAMILY MEDICINE

## 2020-12-08 PROCEDURE — 99204 OFFICE O/P NEW MOD 45 MIN: CPT | Mod: S$GLB,,, | Performed by: FAMILY MEDICINE

## 2020-12-08 PROCEDURE — 3008F PR BODY MASS INDEX (BMI) DOCUMENTED: ICD-10-PCS | Mod: S$GLB,,, | Performed by: FAMILY MEDICINE

## 2020-12-08 PROCEDURE — 1126F AMNT PAIN NOTED NONE PRSNT: CPT | Mod: S$GLB,,, | Performed by: FAMILY MEDICINE

## 2020-12-08 RX ORDER — FLUOXETINE HYDROCHLORIDE 20 MG/1
60 CAPSULE ORAL DAILY
Qty: 270 CAPSULE | Refills: 1 | Status: SHIPPED | OUTPATIENT
Start: 2020-12-08 | End: 2021-04-22

## 2020-12-08 NOTE — PROGRESS NOTES
"  SUBJECTIVE:    Patient ID: Gi Abdullahi is a 50 y.o. female.    Chief Complaint: Establish Care (med refill, Prozac)    Pleasant 50-year-old female presents today to [facility]. Previously established with Dr Loomis.  She has a past medical history of anxiety, depression, she is status post hysterectomy and partial oophorectomy and status post cholecystectomy.  She has also had lumpectomy of the right breast which was found to be benign. Doing well on Prozac that she has been taking since 1999. It was initially started after her  passed away. Has been on the med intermittently since then.  Today she has complaint of progressively worsening tinnitus.  She has been taking over the counter "ear relief" without significant improvement in her symptoms.  She has not been taking any other medications that could cause her ear ringing. Denies problems hearing, no associated H/A, or nausea.  Patient offered ENT evaluation, she would like to wait.  There is also bilateral leg nocturnal cramping that has been has been worsening over the last month.  Discussed common causes of leg cramping.  She would like to have lab workup 1st and proceed with medication if necessary.    She recently had ganglion cyst removed from her right wrist.  Follows with Dr. Mccann.  Will also have orthopedic repair left wrist in the near future.    Unsure of date of late pap smear but was "long ago." Denies abnormal pap smears. Had routine colonoscopy this year with Dr Ibarra, was told to come back in 5 years. Smokes on ppd, since age 16. She is not interested in cessation and enjoys smoking. All questions and concerns addressed.         Lab Visit on 11/29/2020   Component Date Value Ref Range Status    SARS-CoV2 (COVID-19) Qualitative P* 11/29/2020 Not Detected  Not Detected Final   Lab Visit on 11/23/2020   Component Date Value Ref Range Status    Sodium 11/23/2020 137  136 - 145 mmol/L Final    Potassium 11/23/2020 3.6  3.5 - 5.1 " mmol/L Final    Chloride 11/23/2020 99  95 - 110 mmol/L Final    CO2 11/23/2020 27  23 - 29 mmol/L Final    Glucose 11/23/2020 93  70 - 110 mg/dL Final    BUN 11/23/2020 9  6 - 20 mg/dL Final    Creatinine 11/23/2020 0.7  0.5 - 1.4 mg/dL Final    Calcium 11/23/2020 10.0  8.7 - 10.5 mg/dL Final    Total Protein 11/23/2020 7.2  6.0 - 8.4 g/dL Final    Albumin 11/23/2020 3.6  3.5 - 5.2 g/dL Final    Total Bilirubin 11/23/2020 0.3  0.1 - 1.0 mg/dL Final    Alkaline Phosphatase 11/23/2020 124  55 - 135 U/L Final    AST 11/23/2020 26  10 - 40 U/L Final    ALT 11/23/2020 91* 10 - 44 U/L Final    Anion Gap 11/23/2020 11  8 - 16 mmol/L Final    eGFR if African American 11/23/2020 >60.0  >60 mL/min/1.73 m^2 Final    eGFR if non African American 11/23/2020 >60.0  >60 mL/min/1.73 m^2 Final    WBC 11/23/2020 9.84  3.90 - 12.70 K/uL Final    RBC 11/23/2020 4.74  4.00 - 5.40 M/uL Final    Hemoglobin 11/23/2020 13.4  12.0 - 16.0 g/dL Final    Hematocrit 11/23/2020 43.8  37.0 - 48.5 % Final    MCV 11/23/2020 92  82 - 98 fL Final    MCH 11/23/2020 28.3  27.0 - 31.0 pg Final    MCHC 11/23/2020 30.6* 32.0 - 36.0 g/dL Final    RDW 11/23/2020 15.4* 11.5 - 14.5 % Final    Platelets 11/23/2020 402* 150 - 350 K/uL Final    MPV 11/23/2020 9.6  9.2 - 12.9 fL Final    Immature Granulocytes 11/23/2020 1.0* 0.0 - 0.5 % Final    Gran # (ANC) 11/23/2020 5.7  1.8 - 7.7 K/uL Final    Immature Grans (Abs) 11/23/2020 0.10* 0.00 - 0.04 K/uL Final    Lymph # 11/23/2020 3.2  1.0 - 4.8 K/uL Final    Mono # 11/23/2020 0.6  0.3 - 1.0 K/uL Final    Eos # 11/23/2020 0.2  0.0 - 0.5 K/uL Final    Baso # 11/23/2020 0.07  0.00 - 0.20 K/uL Final    nRBC 11/23/2020 0  0 /100 WBC Final    Gran % 11/23/2020 58.4  38.0 - 73.0 % Final    Lymph % 11/23/2020 32.2  18.0 - 48.0 % Final    Mono % 11/23/2020 6.1  4.0 - 15.0 % Final    Eosinophil % 11/23/2020 1.6  0.0 - 8.0 % Final    Basophil % 11/23/2020 0.7  0.0 - 1.9 % Final     Differential Method 11/23/2020 Automated   Final       Past Medical History:   Diagnosis Date    Arthritis     Cervical cancer 1995    Depression     Insomnia     Tinnitus 2020     Social History     Socioeconomic History    Marital status:      Spouse name: Not on file    Number of children: Not on file    Years of education: Not on file    Highest education level: Not on file   Occupational History    Not on file   Social Needs    Financial resource strain: Not on file    Food insecurity     Worry: Not on file     Inability: Not on file    Transportation needs     Medical: Not on file     Non-medical: Not on file   Tobacco Use    Smoking status: Current Every Day Smoker     Packs/day: 1.00     Years: 30.00     Pack years: 30.00     Start date: 12/8/1986    Smokeless tobacco: Never Used    Tobacco comment: DO NOT SMOKE DAY OF SURGERY   Substance and Sexual Activity    Alcohol use: Never     Frequency: Never    Drug use: Never    Sexual activity: Not on file   Lifestyle    Physical activity     Days per week: Not on file     Minutes per session: Not on file    Stress: Not on file   Relationships    Social connections     Talks on phone: Not on file     Gets together: Not on file     Attends Sikh service: Not on file     Active member of club or organization: Not on file     Attends meetings of clubs or organizations: Not on file     Relationship status: Not on file   Other Topics Concern    Not on file   Social History Narrative    Not on file     Past Surgical History:   Procedure Laterality Date    BREAST BIOPSY Right 11/8/2019    Procedure: BIOPSY, BREAST;  Surgeon: Roni Arias MD;  Location: [facility] OR;  Service: General;  Laterality: Right;    CERVIX REMOVAL      CYST REMOVAL Right 12/2/2020    Procedure: EXCISION, CYST;  Surgeon: Adis Mccann MD;  Location: [facility] OR;  Service: Orthopedics;  Laterality: Right;    HEMORRHOID SURGERY  2018    Dr Quintero    HYSTERECTOMY  " 1995    LAPAROSCOPIC CHOLECYSTECTOMY N/A 9/3/2018    Procedure: CHOLECYSTECTOMY, LAPAROSCOPIC;  Surgeon: Wilmer Degroot MD;  Location: [facility];  Service: General;  Laterality: N/A;     Family History   Problem Relation Age of Onset    Hypertension Mother     Lymphoma Father        Review of patient's allergies indicates:  No Known Allergies    Current Outpatient Medications:     FLUoxetine 20 MG capsule, Take 3 capsules (60 mg total) by mouth once daily., Disp: 270 capsule, Rfl: 1    Review of Systems   Constitutional: Negative for activity change, appetite change, chills, fatigue and fever.   HENT: Positive for tinnitus. Negative for congestion, drooling, ear pain, facial swelling, postnasal drip and rhinorrhea.    Respiratory: Negative for cough, choking, shortness of breath and wheezing.    Cardiovascular: Negative for chest pain, palpitations and leg swelling.   Gastrointestinal: Negative for abdominal distention, abdominal pain, constipation, diarrhea, nausea and vomiting.   Genitourinary: Negative for dysuria, frequency and urgency.   Musculoskeletal: Positive for myalgias (nocturnal ).        Leg cramping   Skin: Negative for wound.   Neurological: Negative for headaches.   Psychiatric/Behavioral: Negative for agitation, behavioral problems and confusion.          Objective:      Vitals:    12/08/20 1427 12/08/20 1452   BP: 114/76    Pulse: (!) 113 82   Temp: 98.1 °F (36.7 °C)    SpO2: 97%    Weight: 104 kg (229 lb 3.2 oz)    Height: 5' 5" (1.651 m)      Physical Exam  Constitutional:       General: She is not in acute distress.     Appearance: Normal appearance. She is not ill-appearing.   HENT:      Head: Normocephalic and atraumatic.      Right Ear: Tympanic membrane and external ear normal. There is no impacted cerumen.      Left Ear: Tympanic membrane and external ear normal. There is no impacted cerumen.   Eyes:      General: No scleral icterus.        Right eye: No discharge.         Left " eye: No discharge.      Extraocular Movements: Extraocular movements intact.      Conjunctiva/sclera: Conjunctivae normal.   Cardiovascular:      Rate and Rhythm: Normal rate and regular rhythm.      Pulses: Normal pulses.      Heart sounds: Normal heart sounds. No murmur.   Pulmonary:      Effort: Pulmonary effort is normal. No respiratory distress.      Breath sounds: Normal breath sounds. No wheezing.   Abdominal:      General: Bowel sounds are normal. There is no distension.      Palpations: Abdomen is soft.      Tenderness: There is no abdominal tenderness. There is no guarding or rebound.   Musculoskeletal: Normal range of motion.      Right lower leg: No edema.      Left lower leg: No edema.   Skin:     General: Skin is warm and dry.   Neurological:      General: No focal deficit present.      Mental Status: She is alert and oriented to person, place, and time.      Motor: No tremor.   Psychiatric:         Mood and Affect: Mood normal.         Speech: Speech normal.         Behavior: Behavior normal.         Thought Content: Thought content normal.         Judgment: Judgment normal.           Assessment:       1. Depression, unspecified depression type    2. Anxiety and depression    3. Leg cramping    4. Tinnitus, unspecified laterality    5. Health care maintenance    6. Encounter for screening for HIV    7. Need for hepatitis C screening test    8. Class 2 obesity due to excess calories without serious comorbidity with body mass index (BMI) of 38.0 to 38.9 in adult         Plan:       Depression, unspecified depression type  Comments:  Continue with Prozac  TSH  Orders:  -     TSH; Future; Expected date: 12/08/2020  -     FLUoxetine 20 MG capsule; Take 3 capsules (60 mg total) by mouth once daily.  Dispense: 270 capsule; Refill: 1    Anxiety and depression  -     FLUoxetine 20 MG capsule; Take 3 capsules (60 mg total) by mouth once daily.  Dispense: 270 capsule; Refill: 1    Leg  cramping  Comments:  Routine lab work up   Iron studies  Encouraged conservative therapies  Orders:  -     Iron and TIBC; Future; Expected date: 12/08/2020  -     Ferritin; Future; Expected date: 12/08/2020    Tinnitus, unspecified laterality  Comments:  Routine lab workup  Consider ENT eval and/or vestibular therapy    Health care maintenance  -     HIV 1/2 Ag/Ab (4th Gen); Future; Expected date: 12/08/2020  -     Hepatitis C Antibody; Future; Expected date: 12/08/2020  -     CBC Auto Differential; Future; Expected date: 12/08/2020  -     Comprehensive Metabolic Panel; Future; Expected date: 12/08/2020  -     Lipid Panel; Future; Expected date: 12/08/2020  -     TSH; Future; Expected date: 12/08/2020  -     Iron and TIBC; Future; Expected date: 12/08/2020  -     Ferritin; Future; Expected date: 12/08/2020    Encounter for screening for HIV  -     HIV 1/2 Ag/Ab (4th Gen); Future; Expected date: 12/08/2020    Need for hepatitis C screening test  -     Hepatitis C Antibody; Future; Expected date: 12/08/2020    Class 2 obesity due to excess calories without serious comorbidity with body mass index (BMI) of 38.0 to 38.9 in adult  -     Lipid Panel; Future; Expected date: 12/08/2020      No follow-ups on file.        12/8/2020 Katie Mitchell M.D.

## 2020-12-11 ENCOUNTER — OFFICE VISIT (OUTPATIENT)
Dept: ORTHOPEDICS | Facility: CLINIC | Age: 50
End: 2020-12-11
Payer: COMMERCIAL

## 2020-12-11 VITALS — BODY MASS INDEX: 38.15 KG/M2 | WEIGHT: 229 LBS | HEIGHT: 65 IN

## 2020-12-11 DIAGNOSIS — Z98.890 S/P EXCISION OF GANGLION CYST: Primary | ICD-10-CM

## 2020-12-11 PROCEDURE — 1125F AMNT PAIN NOTED PAIN PRSNT: CPT | Mod: S$GLB,,, | Performed by: PHYSICIAN ASSISTANT

## 2020-12-11 PROCEDURE — 99024 POSTOP FOLLOW-UP VISIT: CPT | Mod: S$GLB,,, | Performed by: PHYSICIAN ASSISTANT

## 2020-12-11 PROCEDURE — 1125F PR PAIN SEVERITY QUANTIFIED, PAIN PRESENT: ICD-10-PCS | Mod: S$GLB,,, | Performed by: PHYSICIAN ASSISTANT

## 2020-12-11 PROCEDURE — 3008F BODY MASS INDEX DOCD: CPT | Mod: S$GLB,,, | Performed by: PHYSICIAN ASSISTANT

## 2020-12-11 PROCEDURE — 3008F PR BODY MASS INDEX (BMI) DOCUMENTED: ICD-10-PCS | Mod: S$GLB,,, | Performed by: PHYSICIAN ASSISTANT

## 2020-12-11 PROCEDURE — 99024 PR POST-OP FOLLOW-UP VISIT: ICD-10-PCS | Mod: S$GLB,,, | Performed by: PHYSICIAN ASSISTANT

## 2020-12-11 NOTE — PROGRESS NOTES
[facility]  [facility] [facility] [address]  [place] [address]  Phone: [phone]   [phone]    Patient's PCP:Katie Mitchell MD  Referring Provider: No ref. provider found    POST-OP Note:    Subjective:        Chief Complaint:   Chief Complaint   Patient presents with    Right Wrist - Post-op Evaluation     Post op status of excision of ganglion cyst right wrist on 12/2/20, having a lot of pain, pain is going into top of hand       Past Medical History:   Diagnosis Date    Arthritis     Cervical cancer 1995    Depression     Insomnia     Tinnitus 2020       Past Surgical History:   Procedure Laterality Date    BREAST BIOPSY Right 11/8/2019    Procedure: BIOPSY, BREAST;  Surgeon: Roni Arias MD;  Location: [facility] OR;  Service: General;  Laterality: Right;    CERVIX REMOVAL      CYST REMOVAL Right 12/2/2020    Procedure: EXCISION, CYST;  Surgeon: Adis Mccann MD;  Location: [facility] OR;  Service: Orthopedics;  Laterality: Right;    HEMORRHOID SURGERY  2018    Dr Quintero    HYSTERECTOMY  1995    LAPAROSCOPIC CHOLECYSTECTOMY N/A 9/3/2018    Procedure: CHOLECYSTECTOMY, LAPAROSCOPIC;  Surgeon: Wilmer Degroot MD;  Location: [facility] OR;  Service: General;  Laterality: N/A;       Current Outpatient Medications   Medication Sig    FLUoxetine 20 MG capsule Take 3 capsules (60 mg total) by mouth once daily.     No current facility-administered medications for this visit.        Review of patient's allergies indicates:  No Known Allergies    Family History   Problem Relation Age of Onset    Hypertension Mother     Lymphoma Father        Social History     Socioeconomic History    Marital status:      Spouse name: Not on file    Number of children: Not on file    Years of education: Not on file    Highest education level: Not on file   Occupational History    Not on file   Social Needs    Financial resource strain: Not on file    Food insecurity     Worry: Not on file      Inability: Not on file    Transportation needs     Medical: Not on file     Non-medical: Not on file   Tobacco Use    Smoking status: Current Every Day Smoker     Packs/day: 1.00     Years: 30.00     Pack years: 30.00     Start date: 12/8/1986    Smokeless tobacco: Never Used    Tobacco comment: DO NOT SMOKE DAY OF SURGERY   Substance and Sexual Activity    Alcohol use: Never     Frequency: Never    Drug use: Never    Sexual activity: Not on file   Lifestyle    Physical activity     Days per week: Not on file     Minutes per session: Not on file    Stress: Not on file   Relationships    Social connections     Talks on phone: Not on file     Gets together: Not on file     Attends Anabaptism service: Not on file     Active member of club or organization: Not on file     Attends meetings of clubs or organizations: Not on file     Relationship status: Not on file   Other Topics Concern    Not on file   Social History Narrative    Not on file       History of present illness:  Patient returns to clinic today for the right wrist, she is status post excision of a dorsal ganglion from the right wrist.    Description of the Findings of the Procedure:  Patient was placed on the operating table in supine position.  The right upper extremity was prepped and draped in the usual sterile manner for surgery.  A linear incision was made directly over the cyst.  It was carried down sharply through the skin.  The cyst was identified and meticulously dissected out.  It dissected down deep to the extensor retinaculum in all the way down to the carpal bones.  It appeared to be coming from the scapholunate joint.  It was removed in its entirety and sent for pathology.  We copiously irrigated.  We closed the subcu with 3-0 Vicryl and the skin with 4-0 Monocryl.  Sterile dressings were applied and the patient was noted to be in stable condition      Review of Systems:    Musculoskeletal:  See HPI       Objective:        Physical  Examination:    Vital Signs: There were no vitals filed for this visit.    Body mass index is 38.11 kg/m².    This a well-developed, well nourished patient in no acute distress.  They are alert and oriented and cooperative to examination.        Examination of the dorsal wrist, she has some bruising noted, no significant swelling is appreciated.  Full range of motion of the wrist hand and fingers.  Surgical site appears to be well healed, no evidence wound failure dehiscence or infection.  We did place several new sterile tape dressings over the wound    Pertinent New Results:     XRAY Report / Interpretation:        Assessment:       1. S/P excision of ganglion cyst      Plan:     S/P excision of ganglion cyst        Follow up in about 1 month (around 1/12/2021) for P/O RTWrist Cyst Removal with dr. Mccann.    Follow-up excision dorsal ganglion right wrist.  Wound check in a month, sooner for any concerns.  Patient has de Quervain on the left will address that at her next visit per her request.    @[email or web address]@  DAPHNIE Harper PA-C    This note was created using [employer] voice recognition software that occasionally misinterprets words or phrases.

## 2021-04-21 ENCOUNTER — LAB VISIT (OUTPATIENT)
Dept: LAB | Facility: HOSPITAL | Age: 51
End: 2021-04-21
Attending: ORTHOPAEDIC SURGERY
Payer: COMMERCIAL

## 2021-04-21 DIAGNOSIS — R25.2 LEG CRAMPING: ICD-10-CM

## 2021-04-21 DIAGNOSIS — Z00.00 HEALTH CARE MAINTENANCE: ICD-10-CM

## 2021-04-21 DIAGNOSIS — F32.A DEPRESSION, UNSPECIFIED DEPRESSION TYPE: ICD-10-CM

## 2021-04-21 DIAGNOSIS — Z11.4 ENCOUNTER FOR SCREENING FOR HIV: ICD-10-CM

## 2021-04-21 DIAGNOSIS — Z11.59 NEED FOR HEPATITIS C SCREENING TEST: ICD-10-CM

## 2021-04-21 DIAGNOSIS — E66.09 CLASS 2 OBESITY DUE TO EXCESS CALORIES WITHOUT SERIOUS COMORBIDITY WITH BODY MASS INDEX (BMI) OF 38.0 TO 38.9 IN ADULT: ICD-10-CM

## 2021-04-21 LAB
ALBUMIN SERPL BCP-MCNC: 3.8 G/DL (ref 3.5–5.2)
ALP SERPL-CCNC: 101 U/L (ref 55–135)
ALT SERPL W/O P-5'-P-CCNC: 37 U/L (ref 10–44)
ANION GAP SERPL CALC-SCNC: 8 MMOL/L (ref 8–16)
AST SERPL-CCNC: 22 U/L (ref 10–40)
BASOPHILS # BLD AUTO: 0.06 K/UL (ref 0–0.2)
BASOPHILS NFR BLD: 0.8 % (ref 0–1.9)
BILIRUB SERPL-MCNC: 0.6 MG/DL (ref 0.1–1)
BUN SERPL-MCNC: 12 MG/DL (ref 6–20)
CALCIUM SERPL-MCNC: 9.3 MG/DL (ref 8.7–10.5)
CHLORIDE SERPL-SCNC: 101 MMOL/L (ref 95–110)
CHOLEST SERPL-MCNC: 277 MG/DL (ref 120–199)
CHOLEST/HDLC SERPL: 3.3 {RATIO} (ref 2–5)
CO2 SERPL-SCNC: 26 MMOL/L (ref 23–29)
CREAT SERPL-MCNC: 0.6 MG/DL (ref 0.5–1.4)
DIFFERENTIAL METHOD: NORMAL
EOSINOPHIL # BLD AUTO: 0.2 K/UL (ref 0–0.5)
EOSINOPHIL NFR BLD: 3.1 % (ref 0–8)
ERYTHROCYTE [DISTWIDTH] IN BLOOD BY AUTOMATED COUNT: 13.8 % (ref 11.5–14.5)
EST. GFR  (AFRICAN AMERICAN): >60 ML/MIN/1.73 M^2
EST. GFR  (NON AFRICAN AMERICAN): >60 ML/MIN/1.73 M^2
FERRITIN SERPL-MCNC: 50 NG/ML (ref 20–300)
GLUCOSE SERPL-MCNC: 94 MG/DL (ref 70–110)
HCT VFR BLD AUTO: 43 % (ref 37–48.5)
HDLC SERPL-MCNC: 85 MG/DL (ref 40–75)
HDLC SERPL: 30.7 % (ref 20–50)
HGB BLD-MCNC: 14.1 G/DL (ref 12–16)
IMM GRANULOCYTES # BLD AUTO: 0.02 K/UL (ref 0–0.04)
IMM GRANULOCYTES NFR BLD AUTO: 0.3 % (ref 0–0.5)
IRON SERPL-MCNC: 56 UG/DL (ref 30–160)
LDLC SERPL CALC-MCNC: 172 MG/DL (ref 63–159)
LYMPHOCYTES # BLD AUTO: 2.7 K/UL (ref 1–4.8)
LYMPHOCYTES NFR BLD: 36.2 % (ref 18–48)
MCH RBC QN AUTO: 29.3 PG (ref 27–31)
MCHC RBC AUTO-ENTMCNC: 32.8 G/DL (ref 32–36)
MCV RBC AUTO: 89 FL (ref 82–98)
MONOCYTES # BLD AUTO: 0.4 K/UL (ref 0.3–1)
MONOCYTES NFR BLD: 6 % (ref 4–15)
NEUTROPHILS # BLD AUTO: 3.9 K/UL (ref 1.8–7.7)
NEUTROPHILS NFR BLD: 53.6 % (ref 38–73)
NONHDLC SERPL-MCNC: 192 MG/DL
NRBC BLD-RTO: 0 /100 WBC
PLATELET # BLD AUTO: 357 K/UL (ref 150–450)
PMV BLD AUTO: 10 FL (ref 9.2–12.9)
POTASSIUM SERPL-SCNC: 3.8 MMOL/L (ref 3.5–5.1)
PROT SERPL-MCNC: 7.5 G/DL (ref 6–8.4)
RBC # BLD AUTO: 4.81 M/UL (ref 4–5.4)
SATURATED IRON: 16 % (ref 20–50)
SODIUM SERPL-SCNC: 135 MMOL/L (ref 136–145)
TOTAL IRON BINDING CAPACITY: 357 UG/DL (ref 250–450)
TRANSFERRIN SERPL-MCNC: 255 MG/DL (ref 200–375)
TRIGL SERPL-MCNC: 100 MG/DL (ref 30–150)
TSH SERPL DL<=0.005 MIU/L-ACNC: 2.2 UIU/ML (ref 0.34–5.6)
WBC # BLD AUTO: 7.34 K/UL (ref 3.9–12.7)

## 2021-04-21 PROCEDURE — 85025 COMPLETE CBC W/AUTO DIFF WBC: CPT | Performed by: FAMILY MEDICINE

## 2021-04-21 PROCEDURE — 83540 ASSAY OF IRON: CPT | Performed by: FAMILY MEDICINE

## 2021-04-21 PROCEDURE — 86803 HEPATITIS C AB TEST: CPT | Performed by: FAMILY MEDICINE

## 2021-04-21 PROCEDURE — 80061 LIPID PANEL: CPT | Performed by: FAMILY MEDICINE

## 2021-04-21 PROCEDURE — 82728 ASSAY OF FERRITIN: CPT | Performed by: FAMILY MEDICINE

## 2021-04-21 PROCEDURE — 80053 COMPREHEN METABOLIC PANEL: CPT | Performed by: FAMILY MEDICINE

## 2021-04-21 PROCEDURE — 87389 HIV-1 AG W/HIV-1&-2 AB AG IA: CPT | Performed by: FAMILY MEDICINE

## 2021-04-21 PROCEDURE — 36415 COLL VENOUS BLD VENIPUNCTURE: CPT | Performed by: FAMILY MEDICINE

## 2021-04-21 PROCEDURE — 84443 ASSAY THYROID STIM HORMONE: CPT | Performed by: FAMILY MEDICINE

## 2021-04-22 ENCOUNTER — OFFICE VISIT (OUTPATIENT)
Dept: FAMILY MEDICINE | Facility: CLINIC | Age: 51
End: 2021-04-22
Payer: COMMERCIAL

## 2021-04-22 VITALS
TEMPERATURE: 98 F | DIASTOLIC BLOOD PRESSURE: 84 MMHG | HEIGHT: 65 IN | BODY MASS INDEX: 38.11 KG/M2 | SYSTOLIC BLOOD PRESSURE: 126 MMHG | RESPIRATION RATE: 20 BRPM | OXYGEN SATURATION: 97 % | HEART RATE: 89 BPM

## 2021-04-22 DIAGNOSIS — G43.009 MIGRAINE WITHOUT AURA AND WITHOUT STATUS MIGRAINOSUS, NOT INTRACTABLE: ICD-10-CM

## 2021-04-22 DIAGNOSIS — F32.A ANXIETY AND DEPRESSION: Primary | ICD-10-CM

## 2021-04-22 DIAGNOSIS — Z23 NEED FOR TDAP VACCINATION: ICD-10-CM

## 2021-04-22 DIAGNOSIS — F41.9 ANXIETY AND DEPRESSION: Primary | ICD-10-CM

## 2021-04-22 DIAGNOSIS — Z12.31 BREAST CANCER SCREENING BY MAMMOGRAM: ICD-10-CM

## 2021-04-22 PROBLEM — M67.431 GANGLION OF RIGHT WRIST: Status: RESOLVED | Noted: 2020-11-12 | Resolved: 2021-04-22

## 2021-04-22 PROCEDURE — 1125F AMNT PAIN NOTED PAIN PRSNT: CPT | Mod: S$GLB,,, | Performed by: NURSE PRACTITIONER

## 2021-04-22 PROCEDURE — 90471 IMMUNIZATION ADMIN: CPT | Mod: S$GLB,,, | Performed by: NURSE PRACTITIONER

## 2021-04-22 PROCEDURE — 90471 TDAP VACCINE GREATER THAN OR EQUAL TO 7YO IM: ICD-10-PCS | Mod: S$GLB,,, | Performed by: NURSE PRACTITIONER

## 2021-04-22 PROCEDURE — 1125F PR PAIN SEVERITY QUANTIFIED, PAIN PRESENT: ICD-10-PCS | Mod: S$GLB,,, | Performed by: NURSE PRACTITIONER

## 2021-04-22 PROCEDURE — 90715 TDAP VACCINE 7 YRS/> IM: CPT | Mod: S$GLB,,, | Performed by: NURSE PRACTITIONER

## 2021-04-22 PROCEDURE — 3008F BODY MASS INDEX DOCD: CPT | Mod: S$GLB,,, | Performed by: NURSE PRACTITIONER

## 2021-04-22 PROCEDURE — 99213 OFFICE O/P EST LOW 20 MIN: CPT | Mod: 25,S$GLB,, | Performed by: NURSE PRACTITIONER

## 2021-04-22 PROCEDURE — 90715 TDAP VACCINE GREATER THAN OR EQUAL TO 7YO IM: ICD-10-PCS | Mod: S$GLB,,, | Performed by: NURSE PRACTITIONER

## 2021-04-22 PROCEDURE — 3008F PR BODY MASS INDEX (BMI) DOCUMENTED: ICD-10-PCS | Mod: S$GLB,,, | Performed by: NURSE PRACTITIONER

## 2021-04-22 PROCEDURE — 99213 PR OFFICE/OUTPT VISIT, EST, LEVL III, 20-29 MIN: ICD-10-PCS | Mod: 25,S$GLB,, | Performed by: NURSE PRACTITIONER

## 2021-04-22 RX ORDER — SERTRALINE HYDROCHLORIDE 50 MG/1
75 TABLET, FILM COATED ORAL NIGHTLY
Qty: 45 TABLET | Refills: 1 | Status: SHIPPED | OUTPATIENT
Start: 2021-04-22 | End: 2021-05-26 | Stop reason: DRUGHIGH

## 2021-04-22 RX ORDER — BUTALBITAL, ACETAMINOPHEN AND CAFFEINE 50; 325; 40 MG/1; MG/1; MG/1
1 TABLET ORAL EVERY 6 HOURS PRN
Qty: 30 TABLET | Refills: 0 | Status: SHIPPED | OUTPATIENT
Start: 2021-04-22 | End: 2021-05-22

## 2021-04-22 RX ORDER — METHOCARBAMOL 500 MG/1
500 TABLET, FILM COATED ORAL NIGHTLY PRN
Qty: 30 TABLET | Refills: 0 | Status: SHIPPED | OUTPATIENT
Start: 2021-04-22 | End: 2021-05-02

## 2021-04-23 LAB
HCV AB S/CO SERPL IA: <0.1 S/CO RATIO (ref 0–0.9)
HIV 1+2 AB+HIV1 P24 AG SERPL QL IA: NON REACTIVE

## 2021-05-26 ENCOUNTER — OFFICE VISIT (OUTPATIENT)
Dept: FAMILY MEDICINE | Facility: CLINIC | Age: 51
End: 2021-05-26
Payer: COMMERCIAL

## 2021-05-26 VITALS
TEMPERATURE: 98 F | SYSTOLIC BLOOD PRESSURE: 132 MMHG | BODY MASS INDEX: 38.34 KG/M2 | RESPIRATION RATE: 20 BRPM | DIASTOLIC BLOOD PRESSURE: 80 MMHG | HEIGHT: 65 IN | OXYGEN SATURATION: 95 % | WEIGHT: 230.13 LBS | HEART RATE: 92 BPM

## 2021-05-26 DIAGNOSIS — F33.0 MILD EPISODE OF RECURRENT MAJOR DEPRESSIVE DISORDER: Primary | ICD-10-CM

## 2021-05-26 DIAGNOSIS — E78.2 MIXED HYPERLIPIDEMIA: ICD-10-CM

## 2021-05-26 DIAGNOSIS — G43.009 MIGRAINE WITHOUT AURA AND WITHOUT STATUS MIGRAINOSUS, NOT INTRACTABLE: ICD-10-CM

## 2021-05-26 DIAGNOSIS — G44.211 INTRACTABLE EPISODIC TENSION-TYPE HEADACHE: ICD-10-CM

## 2021-05-26 DIAGNOSIS — F41.9 ANXIETY: ICD-10-CM

## 2021-05-26 PROCEDURE — 3008F PR BODY MASS INDEX (BMI) DOCUMENTED: ICD-10-PCS | Mod: S$GLB,,, | Performed by: NURSE PRACTITIONER

## 2021-05-26 PROCEDURE — 3008F BODY MASS INDEX DOCD: CPT | Mod: S$GLB,,, | Performed by: NURSE PRACTITIONER

## 2021-05-26 PROCEDURE — 1126F PR PAIN SEVERITY QUANTIFIED, NO PAIN PRESENT: ICD-10-PCS | Mod: S$GLB,,, | Performed by: NURSE PRACTITIONER

## 2021-05-26 PROCEDURE — 96372 PR INJECTION,THERAP/PROPH/DIAG2ST, IM OR SUBCUT: ICD-10-PCS | Mod: S$GLB,,, | Performed by: NURSE PRACTITIONER

## 2021-05-26 PROCEDURE — 96372 THER/PROPH/DIAG INJ SC/IM: CPT | Mod: S$GLB,,, | Performed by: NURSE PRACTITIONER

## 2021-05-26 PROCEDURE — 99213 OFFICE O/P EST LOW 20 MIN: CPT | Mod: 25,S$GLB,, | Performed by: NURSE PRACTITIONER

## 2021-05-26 PROCEDURE — 1126F AMNT PAIN NOTED NONE PRSNT: CPT | Mod: S$GLB,,, | Performed by: NURSE PRACTITIONER

## 2021-05-26 PROCEDURE — 99213 PR OFFICE/OUTPT VISIT, EST, LEVL III, 20-29 MIN: ICD-10-PCS | Mod: 25,S$GLB,, | Performed by: NURSE PRACTITIONER

## 2021-05-26 RX ORDER — DEXAMETHASONE SODIUM PHOSPHATE 4 MG/ML
4 INJECTION, SOLUTION INTRA-ARTICULAR; INTRALESIONAL; INTRAMUSCULAR; INTRAVENOUS; SOFT TISSUE
Status: DISCONTINUED | OUTPATIENT
Start: 2021-05-26 | End: 2021-05-26

## 2021-05-26 RX ORDER — UBROGEPANT 100 MG/1
100 TABLET ORAL ONCE AS NEEDED
Qty: 15 TABLET | Refills: 2 | Status: SHIPPED | OUTPATIENT
Start: 2021-05-26 | End: 2021-05-26

## 2021-05-26 RX ORDER — METHOCARBAMOL 500 MG/1
TABLET, FILM COATED ORAL
Qty: 60 TABLET | Refills: 1 | Status: SHIPPED | OUTPATIENT
Start: 2021-05-26 | End: 2021-08-13

## 2021-05-26 RX ORDER — BUSPIRONE HYDROCHLORIDE 7.5 MG/1
7.5 TABLET ORAL 3 TIMES DAILY
Qty: 90 TABLET | Refills: 2 | Status: CANCELLED | OUTPATIENT
Start: 2021-05-26 | End: 2022-05-26

## 2021-05-26 RX ORDER — SERTRALINE HYDROCHLORIDE 100 MG/1
100 TABLET, FILM COATED ORAL NIGHTLY
Qty: 90 TABLET | Refills: 1 | Status: SHIPPED | OUTPATIENT
Start: 2021-05-26 | End: 2023-02-01 | Stop reason: ALTCHOICE

## 2021-05-26 RX ORDER — AMITRIPTYLINE HYDROCHLORIDE 10 MG/1
10 TABLET, FILM COATED ORAL NIGHTLY
Qty: 90 TABLET | Refills: 1 | Status: SHIPPED | OUTPATIENT
Start: 2021-05-26 | End: 2023-02-01 | Stop reason: ALTCHOICE

## 2021-05-26 RX ORDER — KETOROLAC TROMETHAMINE 30 MG/ML
60 INJECTION, SOLUTION INTRAMUSCULAR; INTRAVENOUS
Status: COMPLETED | OUTPATIENT
Start: 2021-05-26 | End: 2021-05-26

## 2021-05-26 RX ORDER — DEXAMETHASONE SODIUM PHOSPHATE 4 MG/ML
8 INJECTION, SOLUTION INTRA-ARTICULAR; INTRALESIONAL; INTRAMUSCULAR; INTRAVENOUS; SOFT TISSUE
Status: COMPLETED | OUTPATIENT
Start: 2021-05-26 | End: 2021-05-26

## 2021-05-26 RX ADMIN — DEXAMETHASONE SODIUM PHOSPHATE 8 MG: 4 INJECTION, SOLUTION INTRA-ARTICULAR; INTRALESIONAL; INTRAMUSCULAR; INTRAVENOUS; SOFT TISSUE at 03:05

## 2021-05-26 RX ADMIN — KETOROLAC TROMETHAMINE 60 MG: 30 INJECTION, SOLUTION INTRAMUSCULAR; INTRAVENOUS at 03:05

## 2021-05-28 ENCOUNTER — TELEPHONE (OUTPATIENT)
Dept: FAMILY MEDICINE | Facility: CLINIC | Age: 51
End: 2021-05-28

## 2021-05-28 RX ORDER — ROSUVASTATIN CALCIUM 20 MG/1
20 TABLET, COATED ORAL NIGHTLY
Qty: 90 TABLET | Refills: 1 | Status: SHIPPED | OUTPATIENT
Start: 2021-05-28 | End: 2023-02-01 | Stop reason: ALTCHOICE

## 2022-06-16 ENCOUNTER — OFFICE VISIT (OUTPATIENT)
Dept: ORTHOPEDICS | Facility: CLINIC | Age: 52
End: 2022-06-16
Payer: COMMERCIAL

## 2022-06-16 VITALS — BODY MASS INDEX: 34.99 KG/M2 | WEIGHT: 210 LBS | HEIGHT: 65 IN

## 2022-06-16 DIAGNOSIS — M77.12 LATERAL EPICONDYLITIS OF LEFT ELBOW: ICD-10-CM

## 2022-06-16 DIAGNOSIS — M77.02 MEDIAL EPICONDYLITIS OF LEFT ELBOW: Primary | ICD-10-CM

## 2022-06-16 PROCEDURE — 3008F PR BODY MASS INDEX (BMI) DOCUMENTED: ICD-10-PCS | Mod: CPTII,S$GLB,, | Performed by: ORTHOPAEDIC SURGERY

## 2022-06-16 PROCEDURE — 1159F MED LIST DOCD IN RCRD: CPT | Mod: CPTII,S$GLB,, | Performed by: ORTHOPAEDIC SURGERY

## 2022-06-16 PROCEDURE — 3008F BODY MASS INDEX DOCD: CPT | Mod: CPTII,S$GLB,, | Performed by: ORTHOPAEDIC SURGERY

## 2022-06-16 PROCEDURE — 99213 PR OFFICE/OUTPT VISIT, EST, LEVL III, 20-29 MIN: ICD-10-PCS | Mod: 25,S$GLB,, | Performed by: ORTHOPAEDIC SURGERY

## 2022-06-16 PROCEDURE — 1159F PR MEDICATION LIST DOCUMENTED IN MEDICAL RECORD: ICD-10-PCS | Mod: CPTII,S$GLB,, | Performed by: ORTHOPAEDIC SURGERY

## 2022-06-16 PROCEDURE — 99213 OFFICE O/P EST LOW 20 MIN: CPT | Mod: 25,S$GLB,, | Performed by: ORTHOPAEDIC SURGERY

## 2022-06-16 PROCEDURE — 1160F RVW MEDS BY RX/DR IN RCRD: CPT | Mod: CPTII,S$GLB,, | Performed by: ORTHOPAEDIC SURGERY

## 2022-06-16 PROCEDURE — 20551 TENDON ORIGIN: L ELBOW: ICD-10-PCS | Mod: LT,S$GLB,, | Performed by: ORTHOPAEDIC SURGERY

## 2022-06-16 PROCEDURE — 20551 NJX 1 TENDON ORIGIN/INSJ: CPT | Mod: LT,S$GLB,, | Performed by: ORTHOPAEDIC SURGERY

## 2022-06-16 PROCEDURE — 1160F PR REVIEW ALL MEDS BY PRESCRIBER/CLIN PHARMACIST DOCUMENTED: ICD-10-PCS | Mod: CPTII,S$GLB,, | Performed by: ORTHOPAEDIC SURGERY

## 2022-06-16 RX ORDER — TRIAMCINOLONE ACETONIDE 40 MG/ML
40 INJECTION, SUSPENSION INTRA-ARTICULAR; INTRAMUSCULAR
Status: DISCONTINUED | OUTPATIENT
Start: 2022-06-16 | End: 2022-06-16 | Stop reason: HOSPADM

## 2022-06-16 RX ADMIN — TRIAMCINOLONE ACETONIDE 40 MG: 40 INJECTION, SUSPENSION INTRA-ARTICULAR; INTRAMUSCULAR at 01:06

## 2022-06-16 NOTE — PROGRESS NOTES
[facility]    Subjective:     Chief Complaint:   Chief Complaint   Patient presents with    Left Elbow - Pain     Left elbow x 2 months, painful ROM w/ flexion and extension, feels pulling with picking things up, and arm feels heavy       Past Medical History:   Diagnosis Date    Arthritis     Cervical cancer 1995    Depression     Insomnia     Tinnitus 2020       Past Surgical History:   Procedure Laterality Date    BREAST BIOPSY Right 11/8/2019    Procedure: BIOPSY, BREAST;  Surgeon: Roni Arias MD;  Location: [facility] OR;  Service: General;  Laterality: Right;    CERVIX REMOVAL      CYST REMOVAL Right 12/2/2020    Procedure: EXCISION, CYST;  Surgeon: Adis Mccann MD;  Location: [facility] OR;  Service: Orthopedics;  Laterality: Right;    HEMORRHOID SURGERY  2018    Dr Quintero    HYSTERECTOMY  1995    LAPAROSCOPIC CHOLECYSTECTOMY N/A 9/3/2018    Procedure: CHOLECYSTECTOMY, LAPAROSCOPIC;  Surgeon: Wilmer Degroot MD;  Location: [facility] OR;  Service: General;  Laterality: N/A;       Current Outpatient Medications   Medication Sig    rosuvastatin (CRESTOR) 20 MG tablet Take 1 tablet (20 mg total) by mouth every evening.    sertraline (ZOLOFT) 100 MG tablet Take 1 tablet (100 mg total) by mouth every evening.    amitriptyline (ELAVIL) 10 MG tablet Take 1 tablet (10 mg total) by mouth every evening.    methocarbamoL (ROBAXIN) 500 MG Tab TAKE 1 TO 2 TABLETS BY MOUTH AT BEDTIME AS NEEDED FOR MUSCLE TIGHTNESS AND SPASM (Patient not taking: Reported on 6/16/2022)     No current facility-administered medications for this visit.       Review of patient's allergies indicates:  No Known Allergies    Family History   Problem Relation Age of Onset    Hypertension Mother     Lymphoma Father        Social History     Socioeconomic History    Marital status:    Tobacco Use    Smoking status: Current Every Day Smoker     Packs/day: 1.50     Years: 30.00     Pack years: 45.00     Start date:  12/8/1986    Smokeless tobacco: Never Used   Substance and Sexual Activity    Alcohol use: Never    Drug use: Never       History of present illness:  Ms. Angelo presents to the clinic today with a chief complaint of left medial and lateral elbow pain that has been going on for about 2 months.  She denies any specific injury or trauma to the elbow.  She has not undergone any treatment.    Review of Systems:    Constitution: Negative for chills, fever, and sweats.  Negative for unexplained weight loss.    HENT:  Negative for headaches and blurry vision.    Cardiovascular:Negative for chest pain or irregular heart beat. Negative for hypertension.    Respiratory:  Negative for cough and shortness of breath.    Gastrointestinal: Negative for abdominal pain, heartburn, melena, nausea, and vomitting.    Genitourinary:  Negative bladder incontinence and dysuria.    Musculoskeletal:  See HPI for details.     Neurological: Negative for numbness.    Psychiatric/Behavioral: Negative for depression.  The patient is not nervous/anxious.      Endocrine: Negative for polyuria    Hematologic/Lymphatic: Negative for bleeding problem.  Does not bruise/bleed easily.    Skin: Negative for poor would healing and rash    Objective:      Physical Examination:    Vital Signs:  There were no vitals filed for this visit.    Body mass index is 34.95 kg/m².    This a well-developed, well nourished patient in no acute distress.  They are alert and oriented and cooperative to examination.        Left elbow exam:  Skin to her left elbow is clean dry and intact.  There is no erythema or ecchymosis.  There are no signs or symptoms of infection.  Patient is neurovascularly intact throughout the left upper extremity.  She can fully flex/extend the left elbow and pronate/supinate the left forearm without difficulty.  She can fully open and close her left hand into a fist.  She can oppose her left thumb to all digits in her left hand.  She is  "left-hand dominant.   strength in her left hand would grade 5/5 in is equal bilaterally.  She is point tender to palpation over both her medial and lateral epicondyles.  She is more tender medially.  She has reproduction of pain on the lateral aspect of her left elbow with resisted wrist extension.  She has reproduction of pain on the medial aspect of her left elbow with resisted wrist flexion.    Pertinent New Results:    XRAY Report / Interpretation:   Two views were taken of her left elbow today:  AP and lateral views.  They reveal no acute fractures or dislocations.  Visualized soft tissues are unremarkable.    Assessment/Plan:      1.  Left elbow medial epicondylitis.  2.  Left elbow lateral epicondylitis.    I injected both her medial and lateral epicondylar regions today with 40 mg Kenalog and lidocaine respectively.  She tolerated this well.  We will also place her in a tennis elbow strap for the lateral epicondylitis.  She is to wear this at all times while awake.  Advised her to research tennis elbow and golfer's elbow home exercises/stretches.  Patient declined recommendation for formal physical therapy.  We will check her back in 6-8 weeks see how she is doing with her elbow.    Edmund Clemons, Physician Assistant, served in the capacity as a "scribe" for this patient encounter.  A "face-to-face" encounter occurred with Dr. Adis Mccann on this date.  The treatment plan and medical decision-making is outlined above. Patient was seen and examined with a chaperone.       This note was created using Dragon voice recognition software that occasionally misinterpreted phrases or words.        "

## 2022-06-16 NOTE — PROCEDURES
Tendon Origin: L elbow    Date/Time: 6/16/2022 1:15 PM  Performed by: Adis Mccann MD  Authorized by: Adis Mccann MD     Consent Done?:  Yes (Verbal)  Timeout: prior to procedure the correct patient, procedure, and site was verified    Indications:  Pain  Site marked: the procedure site was marked    Timeout: prior to procedure the correct patient, procedure, and site was verified    Location:  Elbow (MEDIAL ASPECT)  Site:  L elbow  Prep: patient was prepped and draped in usual sterile fashion    Needle size:  25 G  Medications:  40 mg triamcinolone acetonide 40 mg/mL  Patient tolerance:  Patient tolerated the procedure well with no immediate complications    Tendon Origin: L elbow    Date/Time: 6/16/2022 1:15 PM  Performed by: Adis Mccann MD  Authorized by: Adis Mccann MD     Consent Done?:  Yes (Verbal)  Timeout: prior to procedure the correct patient, procedure, and site was verified    Indications:  Pain  Site marked: the procedure site was marked    Timeout: prior to procedure the correct patient, procedure, and site was verified    Location:  Elbow  Site:  L elbow (LATERAL ASPECT)  Prep: patient was prepped and draped in usual sterile fashion    Needle size:  25 G  Medications:  40 mg triamcinolone acetonide 40 mg/mL  Patient tolerance:  Patient tolerated the procedure well with no immediate complications

## 2022-11-16 ENCOUNTER — OFFICE VISIT (OUTPATIENT)
Dept: ORTHOPEDICS | Facility: CLINIC | Age: 52
End: 2022-11-16
Payer: COMMERCIAL

## 2022-11-16 VITALS — BODY MASS INDEX: 34.99 KG/M2 | HEIGHT: 65 IN | WEIGHT: 210 LBS

## 2022-11-16 DIAGNOSIS — M77.12 LATERAL EPICONDYLITIS OF LEFT ELBOW: Primary | ICD-10-CM

## 2022-11-16 PROCEDURE — 99213 OFFICE O/P EST LOW 20 MIN: CPT | Mod: 25,S$GLB,, | Performed by: PHYSICIAN ASSISTANT

## 2022-11-16 PROCEDURE — 3008F BODY MASS INDEX DOCD: CPT | Mod: CPTII,S$GLB,, | Performed by: PHYSICIAN ASSISTANT

## 2022-11-16 PROCEDURE — 1159F MED LIST DOCD IN RCRD: CPT | Mod: CPTII,S$GLB,, | Performed by: PHYSICIAN ASSISTANT

## 2022-11-16 PROCEDURE — 1160F RVW MEDS BY RX/DR IN RCRD: CPT | Mod: CPTII,S$GLB,, | Performed by: PHYSICIAN ASSISTANT

## 2022-11-16 PROCEDURE — 1159F PR MEDICATION LIST DOCUMENTED IN MEDICAL RECORD: ICD-10-PCS | Mod: CPTII,S$GLB,, | Performed by: PHYSICIAN ASSISTANT

## 2022-11-16 PROCEDURE — 20551 NJX 1 TENDON ORIGIN/INSJ: CPT | Mod: LT,S$GLB,, | Performed by: PHYSICIAN ASSISTANT

## 2022-11-16 PROCEDURE — 20551 TENDON ORIGIN: L ELBOW: ICD-10-PCS | Mod: LT,S$GLB,, | Performed by: PHYSICIAN ASSISTANT

## 2022-11-16 PROCEDURE — 1160F PR REVIEW ALL MEDS BY PRESCRIBER/CLIN PHARMACIST DOCUMENTED: ICD-10-PCS | Mod: CPTII,S$GLB,, | Performed by: PHYSICIAN ASSISTANT

## 2022-11-16 PROCEDURE — 3008F PR BODY MASS INDEX (BMI) DOCUMENTED: ICD-10-PCS | Mod: CPTII,S$GLB,, | Performed by: PHYSICIAN ASSISTANT

## 2022-11-16 PROCEDURE — 99213 PR OFFICE/OUTPT VISIT, EST, LEVL III, 20-29 MIN: ICD-10-PCS | Mod: 25,S$GLB,, | Performed by: PHYSICIAN ASSISTANT

## 2022-11-16 RX ORDER — TRIAMCINOLONE ACETONIDE 40 MG/ML
40 INJECTION, SUSPENSION INTRA-ARTICULAR; INTRAMUSCULAR
Status: DISCONTINUED | OUTPATIENT
Start: 2022-11-16 | End: 2022-11-16 | Stop reason: HOSPADM

## 2022-11-16 RX ADMIN — TRIAMCINOLONE ACETONIDE 40 MG: 40 INJECTION, SUSPENSION INTRA-ARTICULAR; INTRAMUSCULAR at 03:11

## 2022-11-16 NOTE — PROCEDURES
Tendon Origin: L elbow    Date/Time: 11/16/2022 3:15 PM  Performed by: BRII Ratliff  Authorized by: BRII Ratliff     Consent Done?:  Yes (Verbal)  Timeout: prior to procedure the correct patient, procedure, and site was verified    Indications:  Pain  Site marked: the procedure site was marked    Timeout: prior to procedure the correct patient, procedure, and site was verified    Location:  Elbow  Site:  L elbow  Prep: patient was prepped and draped in usual sterile fashion    Needle size:  25 G  Medications:  40 mg triamcinolone acetonide 40 mg/mL  Patient tolerance:  Patient tolerated the procedure well with no immediate complications

## 2022-11-16 NOTE — PROGRESS NOTES
[facility] ORTHOPEDICS  [address]  [place] [address]  Phone: [phone]   [phone]    Patient's PCP: TERE Dominique  Referring Provider: No ref. provider found    Subjective:      Chief Complaint:   Chief Complaint   Patient presents with    Left Elbow - Pain     Bl Elbow pain that has been ongoing. Received inj in left elbow 06/16/22 which offered relief for about 6 weeks. Pain has returned.     Right Elbow - Pain     BL elbow pain. Right elbow pain started about 3 weeks ago.       Past Medical History:   Diagnosis Date    Arthritis     Cervical cancer 1995    Depression     Insomnia     Tinnitus 2020       Past Surgical History:   Procedure Laterality Date    BREAST BIOPSY Right 11/8/2019    Procedure: BIOPSY, BREAST;  Surgeon: Roni Arias MD;  Location: [facility] OR;  Service: General;  Laterality: Right;    CERVIX REMOVAL      CYST REMOVAL Right 12/2/2020    Procedure: EXCISION, CYST;  Surgeon: Adis Mccann MD;  Location: [facility] OR;  Service: Orthopedics;  Laterality: Right;    HEMORRHOID SURGERY  2018    Dr Quintero    HYSTERECTOMY  1995    LAPAROSCOPIC CHOLECYSTECTOMY N/A 9/3/2018    Procedure: CHOLECYSTECTOMY, LAPAROSCOPIC;  Surgeon: Wilmer Degroot MD;  Location: [facility] OR;  Service: General;  Laterality: N/A;       Current Outpatient Medications   Medication Sig    amitriptyline (ELAVIL) 10 MG tablet Take 1 tablet (10 mg total) by mouth every evening.    rosuvastatin (CRESTOR) 20 MG tablet Take 1 tablet (20 mg total) by mouth every evening.    sertraline (ZOLOFT) 100 MG tablet Take 1 tablet (100 mg total) by mouth every evening.     No current facility-administered medications for this visit.       Review of patient's allergies indicates:  No Known Allergies    Family History   Problem Relation Age of Onset    Hypertension Mother     Lymphoma Father        Social History     Socioeconomic History    Marital status:    Tobacco Use    Smoking status: Every Day      Packs/day: 1.50     Years: 30.00     Pack years: 45.00     Types: Cigarettes     Start date: 12/8/1986    Smokeless tobacco: Never   Substance and Sexual Activity    Alcohol use: Never    Drug use: Never       Prior to meeting with the patient I reviewed the medical chart in [facility]. This included reviewing the previous progress notes from our office, review of the patient's last appointment with their primary care provider, review of any visits to the emergency room, and review of any pain management appointments or procedures.    History of present illness: ***      Review of Systems:    Constitutional: Negative for chills, fever and weight loss.   HENT: Negative for congestion.    Eyes: Negative for discharge and redness.   Respiratory: Negative for cough and shortness of breath.    Cardiovascular: Negative for chest pain.   Gastrointestinal: Negative for nausea and vomiting.   Musculoskeletal: See HPI.   Skin: Negative for rash.   Neurological: Negative for headaches.   Endo/Heme/Allergies: Does not bruise/bleed easily.   Psychiatric/Behavioral: The patient is not nervous/anxious.    All other systems reviewed and are negative.       Objective:      Physical Examination:    Vital Signs:  There were no vitals filed for this visit.    Body mass index is 34.95 kg/m².    This a well-developed, well nourished patient in no acute distress.  They are alert and oriented and cooperative to examination.     ***      Pertinent New Results:        XRAY Report / Interpretation:   ***          Assessment:       No diagnosis found.  Plan:     There are no diagnoses linked to this encounter.    No follow-ups on file.    ***    @ERI@  DAPHNIE Harper PA-C    This note was created using [employer] voice recognition software that occasionally misinterprets words or phrases.

## 2022-11-16 NOTE — PROGRESS NOTES
[facility] ORTHOPEDICS  [address]  [place] [address]  Phone: [phone]   [phone]    Patient's PCP: TERE Dominique  Referring Provider: No ref. provider found    Subjective:      Chief Complaint:   Chief Complaint   Patient presents with    Left Elbow - Pain     Bl Elbow pain that has been ongoing. Received inj in left elbow 06/16/22 which offered relief for about 6 weeks. Pain has returned.     Right Elbow - Pain     BL elbow pain. Right elbow pain started about 3 weeks ago.       Past Medical History:   Diagnosis Date    Arthritis     Cervical cancer 1995    Depression     Insomnia     Tinnitus 2020       Past Surgical History:   Procedure Laterality Date    BREAST BIOPSY Right 11/8/2019    Procedure: BIOPSY, BREAST;  Surgeon: Roni Arias MD;  Location: [facility] OR;  Service: General;  Laterality: Right;    CERVIX REMOVAL      CYST REMOVAL Right 12/2/2020    Procedure: EXCISION, CYST;  Surgeon: Adis Mccann MD;  Location: [facility] OR;  Service: Orthopedics;  Laterality: Right;    HEMORRHOID SURGERY  2018    Dr Quintero    HYSTERECTOMY  1995    LAPAROSCOPIC CHOLECYSTECTOMY N/A 9/3/2018    Procedure: CHOLECYSTECTOMY, LAPAROSCOPIC;  Surgeon: Wilmer Degroot MD;  Location: [facility] OR;  Service: General;  Laterality: N/A;       Current Outpatient Medications   Medication Sig    amitriptyline (ELAVIL) 10 MG tablet Take 1 tablet (10 mg total) by mouth every evening.    rosuvastatin (CRESTOR) 20 MG tablet Take 1 tablet (20 mg total) by mouth every evening.    sertraline (ZOLOFT) 100 MG tablet Take 1 tablet (100 mg total) by mouth every evening.     No current facility-administered medications for this visit.       Review of patient's allergies indicates:  No Known Allergies    Family History   Problem Relation Age of Onset    Hypertension Mother     Lymphoma Father        Social History     Socioeconomic History    Marital status:    Tobacco Use    Smoking status: Every Day      Packs/day: 1.50     Years: 30.00     Pack years: 45.00     Types: Cigarettes     Start date: 12/8/1986    Smokeless tobacco: Never   Substance and Sexual Activity    Alcohol use: Never    Drug use: Never       Prior to meeting with the patient I reviewed the medical chart in [facility]. This included reviewing the previous progress notes from our office, review of the patient's last appointment with their primary care provider, review of any visits to the emergency room, and review of any pain management appointments or procedures.    History of present illness:  52-year-old female here to follow-up on her left elbow.  We last saw her in June of this year.  She was having some symptoms of epicondylitis.  She had injections of both the medial and lateral epicondyles.  She was given instructions for physical therapy and a home exercise program as she declined formal physical therapy.  She also was instructed on the use of a strap.    The medial epicondyle is feeling better.  But she has had a recurrence in the pain on the lateral aspect of her left elbow.  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      Review of Systems:    Constitutional: Negative for chills, fever and weight loss.   HENT: Negative for congestion.    Eyes: Negative for discharge and redness.   Respiratory: Negative for cough and shortness of breath.    Cardiovascular: Negative for chest pain.   Gastrointestinal: Negative for nausea and vomiting.   Musculoskeletal: See HPI.   Skin: Negative for rash.   Neurological: Negative for headaches.   Endo/Heme/Allergies: Does not bruise/bleed easily.   Psychiatric/Behavioral: The patient is not nervous/anxious.    All other systems reviewed and are negative.       Objective:      Physical Examination:    Vital Signs:  There were no vitals filed for this visit.    Body mass index is 34.95 kg/m².    This a well-developed, well nourished patient in  no acute distress.  They are alert and oriented and cooperative to examination.     Examination of the left elbow, the patient is tender over the lateral epicondyle.  She has pain with resisted wrist extension.  No significant pain over the medial epicondyle or with wrist flexion.  She has full range of motion in the elbow with flexion extension and pronation supination.      Pertinent New Results:        XRAY Report / Interpretation:             Assessment:       1. Lateral epicondylitis of left elbow      Plan:     Lateral epicondylitis of left elbow      Follow up in about 6 weeks (around 12/28/2022) for Re-check symptoms.    Lateral epicondylitis, medial epicondylitis has improved.  Reinforce the use of a tennis elbow strap, again I tried to get her to do formal physical therapy.  Financially and time wise this is not feasible for the patient at this time.  She will try to find her tennis elbow strap or by a new 1.  We did reinject the lateral epicondyle today at the site of the common extensor tendon with lidocaine and triamcinolone sterile technique.  Patient got immediate improvement in her symptoms.  She will follow up with us in 6 weeks.      DAPHNIE Harper, PASelvinC    This note was created using [employer] voice recognition software that occasionally misinterprets words or phrases.

## 2022-12-05 ENCOUNTER — PATIENT MESSAGE (OUTPATIENT)
Dept: FAMILY MEDICINE | Facility: CLINIC | Age: 52
End: 2022-12-05

## 2023-02-01 ENCOUNTER — OFFICE VISIT (OUTPATIENT)
Dept: FAMILY MEDICINE | Facility: CLINIC | Age: 53
End: 2023-02-01
Payer: COMMERCIAL

## 2023-02-01 VITALS
HEART RATE: 90 BPM | TEMPERATURE: 98 F | RESPIRATION RATE: 18 BRPM | DIASTOLIC BLOOD PRESSURE: 61 MMHG | BODY MASS INDEX: 38.1 KG/M2 | WEIGHT: 228.69 LBS | SYSTOLIC BLOOD PRESSURE: 100 MMHG | HEIGHT: 65 IN

## 2023-02-01 DIAGNOSIS — E78.2 MIXED HYPERLIPIDEMIA: ICD-10-CM

## 2023-02-01 DIAGNOSIS — Z23 NEEDS FLU SHOT: ICD-10-CM

## 2023-02-01 DIAGNOSIS — Z12.31 BREAST CANCER SCREENING BY MAMMOGRAM: ICD-10-CM

## 2023-02-01 DIAGNOSIS — Z13.1 DIABETES MELLITUS SCREENING: ICD-10-CM

## 2023-02-01 DIAGNOSIS — R53.83 FATIGUE, UNSPECIFIED TYPE: ICD-10-CM

## 2023-02-01 DIAGNOSIS — G47.00 INSOMNIA, UNSPECIFIED TYPE: ICD-10-CM

## 2023-02-01 DIAGNOSIS — Z23 NEED FOR PNEUMOCOCCAL VACCINE: ICD-10-CM

## 2023-02-01 DIAGNOSIS — F33.0 MILD EPISODE OF RECURRENT MAJOR DEPRESSIVE DISORDER: Primary | ICD-10-CM

## 2023-02-01 PROCEDURE — 3008F PR BODY MASS INDEX (BMI) DOCUMENTED: ICD-10-PCS | Mod: CPTII,S$GLB,, | Performed by: NURSE PRACTITIONER

## 2023-02-01 PROCEDURE — 1159F PR MEDICATION LIST DOCUMENTED IN MEDICAL RECORD: ICD-10-PCS | Mod: CPTII,S$GLB,, | Performed by: NURSE PRACTITIONER

## 2023-02-01 PROCEDURE — 3008F BODY MASS INDEX DOCD: CPT | Mod: CPTII,S$GLB,, | Performed by: NURSE PRACTITIONER

## 2023-02-01 PROCEDURE — 3074F SYST BP LT 130 MM HG: CPT | Mod: CPTII,S$GLB,, | Performed by: NURSE PRACTITIONER

## 2023-02-01 PROCEDURE — 90732 PNEUMOCOCCAL POLYSACCHARIDE VACCINE 23-VALENT =>2YO SQ IM: ICD-10-PCS | Mod: S$GLB,,, | Performed by: NURSE PRACTITIONER

## 2023-02-01 PROCEDURE — 3078F DIAST BP <80 MM HG: CPT | Mod: CPTII,S$GLB,, | Performed by: NURSE PRACTITIONER

## 2023-02-01 PROCEDURE — 3078F PR MOST RECENT DIASTOLIC BLOOD PRESSURE < 80 MM HG: ICD-10-PCS | Mod: CPTII,S$GLB,, | Performed by: NURSE PRACTITIONER

## 2023-02-01 PROCEDURE — 90472 PNEUMOCOCCAL POLYSACCHARIDE VACCINE 23-VALENT =>2YO SQ IM: ICD-10-PCS | Mod: S$GLB,,, | Performed by: NURSE PRACTITIONER

## 2023-02-01 PROCEDURE — 90686 FLU VACCINE (QUAD) GREATER THAN OR EQUAL TO 3YO PRESERVATIVE FREE IM: ICD-10-PCS | Mod: S$GLB,,, | Performed by: NURSE PRACTITIONER

## 2023-02-01 PROCEDURE — 90472 IMMUNIZATION ADMIN EACH ADD: CPT | Mod: S$GLB,,, | Performed by: NURSE PRACTITIONER

## 2023-02-01 PROCEDURE — 90471 FLU VACCINE (QUAD) GREATER THAN OR EQUAL TO 3YO PRESERVATIVE FREE IM: ICD-10-PCS | Mod: S$GLB,,, | Performed by: NURSE PRACTITIONER

## 2023-02-01 PROCEDURE — 1159F MED LIST DOCD IN RCRD: CPT | Mod: CPTII,S$GLB,, | Performed by: NURSE PRACTITIONER

## 2023-02-01 PROCEDURE — 90471 IMMUNIZATION ADMIN: CPT | Mod: S$GLB,,, | Performed by: NURSE PRACTITIONER

## 2023-02-01 PROCEDURE — 3074F PR MOST RECENT SYSTOLIC BLOOD PRESSURE < 130 MM HG: ICD-10-PCS | Mod: CPTII,S$GLB,, | Performed by: NURSE PRACTITIONER

## 2023-02-01 PROCEDURE — 99213 PR OFFICE/OUTPT VISIT, EST, LEVL III, 20-29 MIN: ICD-10-PCS | Mod: 25,S$GLB,, | Performed by: NURSE PRACTITIONER

## 2023-02-01 PROCEDURE — 90686 IIV4 VACC NO PRSV 0.5 ML IM: CPT | Mod: S$GLB,,, | Performed by: NURSE PRACTITIONER

## 2023-02-01 PROCEDURE — 90732 PPSV23 VACC 2 YRS+ SUBQ/IM: CPT | Mod: S$GLB,,, | Performed by: NURSE PRACTITIONER

## 2023-02-01 PROCEDURE — 99213 OFFICE O/P EST LOW 20 MIN: CPT | Mod: 25,S$GLB,, | Performed by: NURSE PRACTITIONER

## 2023-02-01 RX ORDER — FLUOXETINE HYDROCHLORIDE 20 MG/1
20 CAPSULE ORAL 2 TIMES DAILY
Qty: 60 CAPSULE | Refills: 2 | Status: SHIPPED | OUTPATIENT
Start: 2023-02-01 | End: 2023-05-24

## 2023-02-01 RX ORDER — HYDROXYZINE PAMOATE 25 MG/1
25 CAPSULE ORAL NIGHTLY PRN
Qty: 30 CAPSULE | Refills: 2 | Status: SHIPPED | OUTPATIENT
Start: 2023-02-01 | End: 2023-04-24

## 2023-02-01 NOTE — PROGRESS NOTES
Patient ID: Gi Abdullahi is a 52 y.o. female.    Chief Complaint: Insomnia and Medication Refill (Pt requesting rx Prozac. Km)    Ms. Abdullahi  presents today for follow up. She is new to me but was formally established with Alicia WU. She states she is doing well overall and is requesting medication refills at this time. She would like to be restarted on Prozac as she tried Zoloft and did not like how she felt afterwards. She has not had blood work drawn in several months and she is also requesting medication to help with insomnia. She states she is otherwise well and denies any other issues or complaints at this time.     Past Medical History:   Diagnosis Date    Arthritis     Cervical cancer 1995    Depression     Insomnia     Tinnitus 2020     Past Surgical History:   Procedure Laterality Date    BREAST BIOPSY Right 11/8/2019    Procedure: BIOPSY, BREAST;  Surgeon: Roni Arias MD;  Location: [facility] OR;  Service: General;  Laterality: Right;    CERVIX REMOVAL      CYST REMOVAL Right 12/2/2020    Procedure: EXCISION, CYST;  Surgeon: Adis Mccann MD;  Location: [facility] OR;  Service: Orthopedics;  Laterality: Right;    HEMORRHOID SURGERY  2018    Dr Quintero    HYSTERECTOMY  1995    LAPAROSCOPIC CHOLECYSTECTOMY N/A 9/3/2018    Procedure: CHOLECYSTECTOMY, LAPAROSCOPIC;  Surgeon: Wilmer Degroot MD;  Location: [facility] OR;  Service: General;  Laterality: N/A;         Tobacco History:  reports that she has been smoking. She started smoking about 36 years ago. She has a 45.00 pack-year smoking history. She has never used smokeless tobacco.      Review of patient's allergies indicates:  No Known Allergies    Current Outpatient Medications:     FLUoxetine 20 MG capsule, Take 1 capsule (20 mg total) by mouth 2 (two) times daily., Disp: 60 capsule, Rfl: 2    hydrOXYzine pamoate (VISTARIL) 25 MG Cap, Take 1 capsule (25 mg total) by mouth nightly as needed (Insomnia)., Disp: 30 capsule, Rfl: 2    Review of  "Systems   Constitutional:  Positive for activity change and fatigue. Negative for appetite change, fever and unexpected weight change.   HENT:  Negative for congestion, mouth sores, nosebleeds, postnasal drip, rhinorrhea, sinus pressure, sinus pain, sneezing, sore throat and trouble swallowing.    Eyes:  Negative for pain, redness and itching.   Respiratory:  Negative for chest tightness and shortness of breath.    Cardiovascular:  Negative for chest pain and palpitations.   Gastrointestinal:  Negative for abdominal pain, blood in stool, constipation, diarrhea, nausea and vomiting.   Endocrine: Negative for cold intolerance, heat intolerance, polydipsia, polyphagia and polyuria.   Genitourinary:  Negative for difficulty urinating, dysuria, flank pain, frequency, genital sores, menstrual problem, urgency, vaginal bleeding and vaginal discharge.   Musculoskeletal:  Negative for arthralgias and myalgias.   Skin:  Negative for rash and wound.   Allergic/Immunologic: Negative for environmental allergies and food allergies.   Neurological:  Negative for dizziness, light-headedness and headaches.   Hematological:  Negative for adenopathy. Does not bruise/bleed easily.   Psychiatric/Behavioral:  Positive for sleep disturbance. Negative for agitation, confusion, hallucinations and self-injury. The patient is not nervous/anxious.         Objective:      Vitals:    02/01/23 1330   BP: 100/61   Pulse: 90   Resp: 18   Temp: 98 °F (36.7 °C)   TempSrc: Oral   Weight: 103.7 kg (228 lb 11.2 oz)   Height: 5' 5" (1.651 m)     Physical Exam  Vitals reviewed.   Constitutional:       General: She is not in acute distress.     Appearance: Normal appearance. She is obese. She is not ill-appearing, toxic-appearing or diaphoretic.   HENT:      Head: Normocephalic and atraumatic.      Right Ear: Tympanic membrane and external ear normal. There is no impacted cerumen.      Left Ear: Tympanic membrane and external ear normal. There is no " impacted cerumen.      Nose: Nose normal. No congestion or rhinorrhea.      Mouth/Throat:      Mouth: Mucous membranes are moist.      Pharynx: Oropharynx is clear. No oropharyngeal exudate or posterior oropharyngeal erythema.   Eyes:      General: No scleral icterus.        Right eye: No discharge.         Left eye: No discharge.      Extraocular Movements: Extraocular movements intact.      Conjunctiva/sclera: Conjunctivae normal.      Pupils: Pupils are equal, round, and reactive to light.   Neck:      Vascular: No carotid bruit.   Cardiovascular:      Rate and Rhythm: Normal rate and regular rhythm.      Pulses: Normal pulses.      Heart sounds: Normal heart sounds. No murmur heard.    No friction rub. No gallop.   Pulmonary:      Effort: Pulmonary effort is normal. No respiratory distress.      Breath sounds: Normal breath sounds. No stridor. No rales.   Chest:      Chest wall: No tenderness.   Abdominal:      General: Abdomen is flat. Bowel sounds are normal.      Palpations: Abdomen is soft. There is no mass.      Tenderness: There is no abdominal tenderness. There is no guarding.   Musculoskeletal:         General: No swelling or signs of injury. Normal range of motion.      Cervical back: Normal range of motion and neck supple. No rigidity or tenderness.      Right lower leg: No edema.      Left lower leg: No edema.   Skin:     General: Skin is warm and dry.      Capillary Refill: Capillary refill takes less than 2 seconds.      Findings: No bruising, lesion or rash.   Neurological:      General: No focal deficit present.      Mental Status: She is alert and oriented to person, place, and time. Mental status is at baseline.      Motor: No weakness.      Coordination: Coordination normal.   Psychiatric:         Mood and Affect: Mood normal.         Behavior: Behavior normal.         Thought Content: Thought content normal.         Judgment: Judgment normal.         Assessment:       1. Mild episode of  recurrent major depressive disorder    2. Insomnia, unspecified type    3. Mixed hyperlipidemia    4. Breast cancer screening by mammogram    5. Diabetes mellitus screening    6. Needs flu shot    7. Need for pneumococcal vaccine    8. Fatigue, unspecified type           Plan:       Mild episode of recurrent major depressive disorder  -     FLUoxetine 20 MG capsule; Take 1 capsule (20 mg total) by mouth 2 (two) times daily.  Dispense: 60 capsule; Refill: 2  -     Comprehensive Metabolic Panel; Future; Expected date: 02/01/2023    Insomnia, unspecified type  -     hydrOXYzine pamoate (VISTARIL) 25 MG Cap; Take 1 capsule (25 mg total) by mouth nightly as needed (Insomnia).  Dispense: 30 capsule; Refill: 2    Mixed hyperlipidemia  -     Lipid Panel; Future; Expected date: 02/01/2023    Breast cancer screening by mammogram  -     Mammo Digital Screening Bilat; Future; Expected date: 02/01/2023    Diabetes mellitus screening  -     Hemoglobin A1C; Future; Expected date: 02/01/2023    Needs flu shot  -     Influenza - Quadrivalent *Preferred* (6 months+) (PF)    Need for pneumococcal vaccine  -     (In Office Administered) Pneumococcal Polysaccharide Vaccine (23 Valent) (SQ/IM)    Fatigue, unspecified type  -     TSH; Future; Expected date: 02/01/2023  -     Comprehensive Metabolic Panel; Future; Expected date: 02/01/2023      Follow up in about 3 months (around 5/1/2023), or if symptoms worsen or fail to improve, for HLD/Insomnia.        2/1/2023 Jeanie Orozco NP

## 2023-04-24 ENCOUNTER — OFFICE VISIT (OUTPATIENT)
Dept: ORTHOPEDICS | Facility: CLINIC | Age: 53
End: 2023-04-24
Payer: COMMERCIAL

## 2023-04-24 VITALS — WEIGHT: 228 LBS | BODY MASS INDEX: 37.99 KG/M2 | HEIGHT: 65 IN

## 2023-04-24 DIAGNOSIS — M77.02 MEDIAL EPICONDYLITIS OF LEFT ELBOW: ICD-10-CM

## 2023-04-24 DIAGNOSIS — M46.02 SPINAL ENTHESOPATHY OF CERVICAL REGION: ICD-10-CM

## 2023-04-24 DIAGNOSIS — M77.12 LATERAL EPICONDYLITIS OF LEFT ELBOW: Primary | ICD-10-CM

## 2023-04-24 PROCEDURE — 1160F PR REVIEW ALL MEDS BY PRESCRIBER/CLIN PHARMACIST DOCUMENTED: ICD-10-PCS | Mod: CPTII,S$GLB,, | Performed by: PHYSICIAN ASSISTANT

## 2023-04-24 PROCEDURE — 99213 OFFICE O/P EST LOW 20 MIN: CPT | Mod: 25,S$GLB,, | Performed by: PHYSICIAN ASSISTANT

## 2023-04-24 PROCEDURE — 20552 TRIGGER POINT INJECTION: ICD-10-PCS | Mod: 59,S$GLB,, | Performed by: PHYSICIAN ASSISTANT

## 2023-04-24 PROCEDURE — 20550 PR INJECT TENDON SHEATH/LIGAMENT: ICD-10-PCS | Mod: LT,S$GLB,, | Performed by: PHYSICIAN ASSISTANT

## 2023-04-24 PROCEDURE — 3008F BODY MASS INDEX DOCD: CPT | Mod: CPTII,S$GLB,, | Performed by: PHYSICIAN ASSISTANT

## 2023-04-24 PROCEDURE — 1160F RVW MEDS BY RX/DR IN RCRD: CPT | Mod: CPTII,S$GLB,, | Performed by: PHYSICIAN ASSISTANT

## 2023-04-24 PROCEDURE — 20552 NJX 1/MLT TRIGGER POINT 1/2: CPT | Mod: 59,S$GLB,, | Performed by: PHYSICIAN ASSISTANT

## 2023-04-24 PROCEDURE — 20550 NJX 1 TENDON SHEATH/LIGAMENT: CPT | Mod: LT,S$GLB,, | Performed by: PHYSICIAN ASSISTANT

## 2023-04-24 PROCEDURE — 1159F MED LIST DOCD IN RCRD: CPT | Mod: CPTII,S$GLB,, | Performed by: PHYSICIAN ASSISTANT

## 2023-04-24 PROCEDURE — 99213 PR OFFICE/OUTPT VISIT, EST, LEVL III, 20-29 MIN: ICD-10-PCS | Mod: 25,S$GLB,, | Performed by: PHYSICIAN ASSISTANT

## 2023-04-24 PROCEDURE — 3008F PR BODY MASS INDEX (BMI) DOCUMENTED: ICD-10-PCS | Mod: CPTII,S$GLB,, | Performed by: PHYSICIAN ASSISTANT

## 2023-04-24 PROCEDURE — 1159F PR MEDICATION LIST DOCUMENTED IN MEDICAL RECORD: ICD-10-PCS | Mod: CPTII,S$GLB,, | Performed by: PHYSICIAN ASSISTANT

## 2023-04-24 RX ORDER — METHYLPREDNISOLONE ACETATE 40 MG/ML
40 INJECTION, SUSPENSION INTRA-ARTICULAR; INTRALESIONAL; INTRAMUSCULAR; SOFT TISSUE
Status: DISCONTINUED | OUTPATIENT
Start: 2023-04-24 | End: 2023-04-24 | Stop reason: HOSPADM

## 2023-04-24 RX ORDER — TRIAMCINOLONE ACETONIDE 40 MG/ML
40 INJECTION, SUSPENSION INTRA-ARTICULAR; INTRAMUSCULAR
Status: DISCONTINUED | OUTPATIENT
Start: 2023-04-24 | End: 2023-04-24 | Stop reason: HOSPADM

## 2023-04-24 RX ADMIN — TRIAMCINOLONE ACETONIDE 40 MG: 40 INJECTION, SUSPENSION INTRA-ARTICULAR; INTRAMUSCULAR at 02:04

## 2023-04-24 RX ADMIN — METHYLPREDNISOLONE ACETATE 40 MG: 40 INJECTION, SUSPENSION INTRA-ARTICULAR; INTRALESIONAL; INTRAMUSCULAR; SOFT TISSUE at 02:04

## 2023-04-24 NOTE — PROCEDURES
Tendon Sheath    Date/Time: 4/24/2023 2:30 PM  Performed by: Edmund Clemons PA-C  Authorized by: Edmund Clemons PA-C     Consent Done?:  Yes (Verbal)  Indications:  Pain and joint swelling  Site marked: the procedure site was marked    Timeout: prior to procedure the correct patient, procedure, and site was verified    Prep: patient was prepped and draped in usual sterile fashion      Local anesthetic:  Lidocaine 1% without epinephrine  Ultrasonic guidance for needle placement?: No    Needle size:  25 G  Approach:  Lateral  Medications:  40 mg methylPREDNISolone acetate 40 mg/mL  Patient tolerance:  Patient tolerated the procedure well with no immediate complications    Additional Comments: Elbow

## 2023-04-24 NOTE — PROGRESS NOTES
[facility] ORTHOPEDICS  [address]  [place] [address]  Phone: [phone]   [phone]    Patient's PCP: TERE Dominique  Referring Provider: No ref. provider found    Subjective:      Chief Complaint:   Chief Complaint   Patient presents with    Left Elbow - Pain     Patient is having left elbow pain, she is requesting an injection.    Left Shoulder - Pain     Patient is having left shoulder pain, this pain started about two weeks ago, she has painful range of motion, he has pain by she shoulder blade.       Past Medical History:   Diagnosis Date    Arthritis     Cervical cancer 1995    Depression     Insomnia     Tinnitus 2020       Past Surgical History:   Procedure Laterality Date    BREAST BIOPSY Right 11/8/2019    Procedure: BIOPSY, BREAST;  Surgeon: Roni Arias MD;  Location: [facility] OR;  Service: General;  Laterality: Right;    CERVIX REMOVAL      CYST REMOVAL Right 12/2/2020    Procedure: EXCISION, CYST;  Surgeon: Adis Mccann MD;  Location: [facility] OR;  Service: Orthopedics;  Laterality: Right;    HEMORRHOID SURGERY  2018    Dr Quintero    HYSTERECTOMY  1995    LAPAROSCOPIC CHOLECYSTECTOMY N/A 9/3/2018    Procedure: CHOLECYSTECTOMY, LAPAROSCOPIC;  Surgeon: Wilmer Degroot MD;  Location: [facility] OR;  Service: General;  Laterality: N/A;       Current Outpatient Medications   Medication Sig    FLUoxetine 20 MG capsule Take 1 capsule (20 mg total) by mouth 2 (two) times daily.     No current facility-administered medications for this visit.       Review of patient's allergies indicates:  No Known Allergies    Family History   Problem Relation Age of Onset    Hypertension Mother     Lymphoma Father        Social History     Socioeconomic History    Marital status:    Tobacco Use    Smoking status: Every Day     Packs/day: 1.50     Years: 30.00     Pack years: 45.00     Types: Cigarettes     Start date: 12/8/1986    Smokeless tobacco: Never   Substance and Sexual Activity     Alcohol use: Never    Drug use: Never       History of present illness:  Gi comes in today for follow-up for her left elbow.  She complains of left medial elbow pain and left posterior shoulder pain.  Her shoulder has been hurting for 3 weeks.  She denies any injury or trauma to the elbow or shoulder.  She does have a history of left elbow lateral epicondylitis that has been treated with injections in the past.    Review of Systems:    Constitutional: Negative for chills, fever and weight loss.   HENT: Negative for congestion.    Eyes: Negative for discharge and redness.   Respiratory: Negative for cough and shortness of breath.    Cardiovascular: Negative for chest pain.   Gastrointestinal: Negative for nausea and vomiting.   Musculoskeletal: See HPI.   Skin: Negative for rash.   Neurological: Negative for headaches.   Endo/Heme/Allergies: Does not bruise/bleed easily.   Psychiatric/Behavioral: The patient is not nervous/anxious.    All other systems reviewed and are negative.       Objective:      Physical Examination:    Vital Signs:  There were no vitals filed for this visit.    Body mass index is 37.94 kg/m².    This a well-developed, well nourished patient in no acute distress.  They are alert and oriented and cooperative to examination.     Left elbow and shoulder exam:  Skin to the left elbow and shoulder is clean dry and intact.  There is no erythema or ecchymosis.  There are no signs or symptoms of infection.  Patient is neurovascularly intact throughout the left upper extremity.  She can open and close her left hand into a fist.  She can oppose her left thumb to all digits in her left hand.  She can flex/extend fully at the left elbow and pronate/supinate the left forearm.  She is nontender over the left lateral epicondyle.  She is exquisitely tender over the left medial epicondyle.  For the left shoulder, she can forward flex to 145°, externally rotate 90°, and fully internally rotate.  She is  diffusely tender to palpation about medial scapular border on the left over the rhomboids.  Her left rotator cuff tendon is grossly intact to resisted muscle testing and strong.  She tolerates resisted internal and external rotation maneuvers of the left shoulder without difficulty or pain.  She is a negative Neer impingement sign.  She has a negative García test.  She is nontender over the left acromioclavicular joint.    Pertinent New Results:        XRAY Report / Interpretation:   Three views were taken of the left elbow today: AP, lateral, and oblique views.  They reveal no acute fractures or dislocations.  Visualized soft tissues appear unremarkable.      Two views were taken of the left shoulder today: AP and Y-views.  They reveal no acute fractures or dislocations.  Visualized soft tissues appear unremarkable.      Assessment:       1. Lateral epicondylitis of left elbow    2. Medial epicondylitis of left elbow    3. Spinal enthesopathy of cervical region      Plan:     Lateral epicondylitis of left elbow  -     Cancel: X-Ray Elbow 2 Views Left  -     X-Ray Elbow Complete 3 view Left  -     Tendon Sheath    Medial epicondylitis of left elbow  -     Cancel: X-Ray Elbow 2 Views Left  -     X-Ray Elbow Complete 3 view Left    Spinal enthesopathy of cervical region  -     X-Ray Shoulder 2 or More Views Left  -     Trigger Point Injection        Follow up if symptoms worsen or fail to improve.    I injected the patient's left elbow medial epicondyle today with 40 mg of Kenalog and lidocaine.  Also injected her left posterior shoulder along the medial scapular border in her rhomboids to tender trigger points.  I did this with a total of 40 mg of Kenalog and lidocaine.  She tolerated this well.  We will see her back on an as-needed basis if she does not get resolution of her symptoms.  I did offer formal therapy and she declined.        Edmund Clemons, [place], PA-C    This note was created using [employer] voice  recognition software that occasionally misinterprets words or phrases.

## 2023-04-24 NOTE — PROCEDURES
Trigger Point Injection  Performed by: Edmund Clemons PA-C  Authorized by: Edmund Cleomns PA-C     Cervical Paraspinal:  Right    Consent Done?:  Yes (Verbal)    Site marked: the procedure site was marked     Timeout: prior to procedure the correct patient, procedure, and site was verified    Prep: patient was prepped and draped in usual sterile fashion     Local anesthesia used?: Yes    Local anesthetic:  Lidocaine 1% without epinephrine  Medications: 40 mg triamcinolone acetonide 40 mg/mL

## 2023-05-26 ENCOUNTER — HOSPITAL ENCOUNTER (EMERGENCY)
Facility: HOSPITAL | Age: 53
Discharge: HOME | End: 2023-05-26
Attending: EMERGENCY MEDICINE
Payer: COMMERCIAL

## 2023-05-26 VITALS
OXYGEN SATURATION: 97 % | HEART RATE: 90 BPM | HEIGHT: 65 IN | DIASTOLIC BLOOD PRESSURE: 90 MMHG | WEIGHT: 200 LBS | TEMPERATURE: 98 F | SYSTOLIC BLOOD PRESSURE: 130 MMHG | BODY MASS INDEX: 33.32 KG/M2 | RESPIRATION RATE: 18 BRPM

## 2023-05-26 DIAGNOSIS — M79.672 LEFT FOOT PAIN: Primary | ICD-10-CM

## 2023-05-26 LAB
ALBUMIN SERPL BCP-MCNC: 3.8 G/DL (ref 3.5–5.2)
ALP SERPL-CCNC: 75 U/L (ref 55–135)
ALT SERPL W/O P-5'-P-CCNC: 20 U/L (ref 10–44)
ANION GAP SERPL CALC-SCNC: 6 MMOL/L (ref 8–16)
AST SERPL-CCNC: 21 U/L (ref 10–40)
BASOPHILS # BLD AUTO: 0.07 K/UL (ref 0–0.2)
BASOPHILS NFR BLD: 0.7 % (ref 0–1.9)
BILIRUB SERPL-MCNC: 0.7 MG/DL (ref 0.1–1)
BUN SERPL-MCNC: 13 MG/DL (ref 6–20)
CALCIUM SERPL-MCNC: 9.7 MG/DL (ref 8.7–10.5)
CHLORIDE SERPL-SCNC: 101 MMOL/L (ref 95–110)
CO2 SERPL-SCNC: 27 MMOL/L (ref 23–29)
CREAT SERPL-MCNC: 0.7 MG/DL (ref 0.5–1.4)
DIFFERENTIAL METHOD: NORMAL
EOSINOPHIL # BLD AUTO: 0.2 K/UL (ref 0–0.5)
EOSINOPHIL NFR BLD: 1.6 % (ref 0–8)
ERYTHROCYTE [DISTWIDTH] IN BLOOD BY AUTOMATED COUNT: 14.1 % (ref 11.5–14.5)
EST. GFR  (NO RACE VARIABLE): >60 ML/MIN/1.73 M^2
GLUCOSE SERPL-MCNC: 90 MG/DL (ref 70–110)
HCT VFR BLD AUTO: 44.3 % (ref 37–48.5)
HGB BLD-MCNC: 14.7 G/DL (ref 12–16)
IMM GRANULOCYTES # BLD AUTO: 0.02 K/UL (ref 0–0.04)
IMM GRANULOCYTES NFR BLD AUTO: 0.2 % (ref 0–0.5)
LYMPHOCYTES # BLD AUTO: 2.7 K/UL (ref 1–4.8)
LYMPHOCYTES NFR BLD: 26.2 % (ref 18–48)
MCH RBC QN AUTO: 29.9 PG (ref 27–31)
MCHC RBC AUTO-ENTMCNC: 33.2 G/DL (ref 32–36)
MCV RBC AUTO: 90 FL (ref 82–98)
MONOCYTES # BLD AUTO: 0.6 K/UL (ref 0.3–1)
MONOCYTES NFR BLD: 6 % (ref 4–15)
NEUTROPHILS # BLD AUTO: 6.8 K/UL (ref 1.8–7.7)
NEUTROPHILS NFR BLD: 65.3 % (ref 38–73)
NRBC BLD-RTO: 0 /100 WBC
PLATELET # BLD AUTO: 396 K/UL (ref 150–450)
PMV BLD AUTO: 9.3 FL (ref 9.2–12.9)
POTASSIUM SERPL-SCNC: 4.2 MMOL/L (ref 3.5–5.1)
PROT SERPL-MCNC: 7.5 G/DL (ref 6–8.4)
RBC # BLD AUTO: 4.92 M/UL (ref 4–5.4)
SODIUM SERPL-SCNC: 134 MMOL/L (ref 136–145)
URATE SERPL-MCNC: 3.7 MG/DL (ref 2.4–5.7)
WBC # BLD AUTO: 10.37 K/UL (ref 3.9–12.7)

## 2023-05-26 PROCEDURE — 25000003 PHARM REV CODE 250

## 2023-05-26 PROCEDURE — 80053 COMPREHEN METABOLIC PANEL: CPT

## 2023-05-26 PROCEDURE — 84550 ASSAY OF BLOOD/URIC ACID: CPT

## 2023-05-26 PROCEDURE — 85025 COMPLETE CBC W/AUTO DIFF WBC: CPT

## 2023-05-26 PROCEDURE — 99284 EMERGENCY DEPT VISIT MOD MDM: CPT

## 2023-05-26 RX ORDER — IBUPROFEN 400 MG/1
800 TABLET ORAL
Status: COMPLETED | OUTPATIENT
Start: 2023-05-26 | End: 2023-05-26

## 2023-05-26 RX ADMIN — IBUPROFEN 800 MG: 400 TABLET, FILM COATED ORAL at 03:05

## 2023-05-26 NOTE — ED PROVIDER NOTES
Encounter Date: 5/26/2023       History     Chief Complaint   Patient presents with    Foot Injury     New onset of left foot pain. Pt states she did not injury it and only noticed it when she attempted to get up from her desk.      Patient is a 53 y.o. female with past medical history depression who presents to ED via family for concern for foot pain which began today.  Patient states she got up from her desk and went to stand on her left foot in it was hurting her.  Patient denies any injury to the foot.  Patient states he was around 230 this afternoon that it started to hurt her.  Patient states she noticed some swelling to the top of the left foot.  Patient denies fever.  Patient denies calf pain.  Patient is awake and alert in no acute distress    Review of patient's allergies indicates:  No Known Allergies  Past Medical History:   Diagnosis Date    Arthritis     Cervical cancer 1995    Depression     Insomnia     Tinnitus 2020     Past Surgical History:   Procedure Laterality Date    BREAST BIOPSY Right 11/8/2019    Procedure: BIOPSY, BREAST;  Surgeon: Roni Arias MD;  Location: [facility] OR;  Service: General;  Laterality: Right;    CERVIX REMOVAL      CYST REMOVAL Right 12/2/2020    Procedure: EXCISION, CYST;  Surgeon: Adis Mccann MD;  Location: [facility] OR;  Service: Orthopedics;  Laterality: Right;    HEMORRHOID SURGERY  2018    Dr Quintero    HYSTERECTOMY  1995    LAPAROSCOPIC CHOLECYSTECTOMY N/A 9/3/2018    Procedure: CHOLECYSTECTOMY, LAPAROSCOPIC;  Surgeon: Wilmer Degroot MD;  Location: [facility] OR;  Service: General;  Laterality: N/A;     Family History   Problem Relation Age of Onset    Hypertension Mother     Lymphoma Father      Social History     Tobacco Use    Smoking status: Every Day     Packs/day: 1.50     Years: 30.00     Pack years: 45.00     Types: Cigarettes     Start date: 12/8/1986    Smokeless tobacco: Never   Substance Use Topics    Alcohol use: Never    Drug use: Never     Review of  Systems   Constitutional:  Negative for fever.   HENT: Negative.     Respiratory:  Negative for cough and shortness of breath.    Cardiovascular:  Negative for chest pain.   Gastrointestinal: Negative.    Genitourinary: Negative.    Musculoskeletal:  Negative for back pain.        Left foot pain   Skin:  Negative for color change, pallor and rash.   Neurological: Negative.  Negative for weakness.   Hematological:  Does not bruise/bleed easily.   Psychiatric/Behavioral: Negative.       Physical Exam     Initial Vitals [05/26/23 1419]   BP Pulse Resp Temp SpO2   (!) 138/93 98 18 98.3 °F (36.8 °C) 97 %      MAP       --         Physical Exam    Nursing note and vitals reviewed.  Constitutional: She appears well-developed and well-nourished. She is not diaphoretic. No distress.   HENT:   Head: Normocephalic and atraumatic.   Right Ear: External ear normal.   Left Ear: External ear normal.   Nose: Nose normal.   Eyes: EOM are normal.   Neck:   Normal range of motion.  Cardiovascular:  Normal rate, regular rhythm, normal heart sounds and intact distal pulses.     Exam reveals no gallop and no friction rub.       No murmur heard.  Pulmonary/Chest: Breath sounds normal. No respiratory distress. She has no wheezes. She has no rhonchi. She has no rales. She exhibits no tenderness.   Musculoskeletal:      Cervical back: Normal range of motion.      Left ankle: No swelling or deformity. No tenderness. Normal range of motion.      Left foot: Normal range of motion and normal capillary refill. Swelling, tenderness and bony tenderness present. No deformity, foot drop or crepitus. Normal pulse.     Neurological: She is alert and oriented to person, place, and time. She has normal strength. GCS score is 15. GCS eye subscore is 4. GCS verbal subscore is 5. GCS motor subscore is 6.   Skin: Skin is warm and dry. Capillary refill takes less than 2 seconds.   Psychiatric: She has a normal mood and affect. Her behavior is normal. Judgment  and thought content normal.       ED Course   Procedures  Labs Reviewed   COMPREHENSIVE METABOLIC PANEL - Abnormal; Notable for the following components:       Result Value    Sodium 134 (*)     Anion Gap 6 (*)     All other components within normal limits   CBC W/ AUTO DIFFERENTIAL   URIC ACID          Imaging Results              X-Ray Foot Complete Left (Final result)  Result time 05/26/23 15:00:52      Final result by Justine Nails MD (05/26/23 15:00:52)                   Narrative:    3 views left foot    Clinical history is swelling and pain    There is normal mineralization. There are no fractures, dislocations or acute osseous abnormalities. There is plantar spurring of the calcaneus. Soft tissues are normal.    IMPRESSION: Negative left foot    Electronically signed by:  Justine Nails MD  5/26/2023 3:00 PM CDT Workstation: [id number]                                     Medications   ibuprofen tablet 800 mg (800 mg Oral Given 5/26/23 0844)     Medical Decision Making:   Initial Assessment:   Patient is a 53 y.o. female with past medical history depression who presents to ED via family for concern for foot pain which began today.  Patient states she got up from her desk and went to stand on her left foot in it was hurting her.  Patient denies any injury to the foot.  Patient states he was around 230 this afternoon that it started to hurt her.  Patient states she noticed some swelling to the top of the left foot.  Patient denies fever.  Patient denies calf pain.  Patient is awake and alert in no acute distress    Differential Diagnosis:   Differential diagnosis include but not limited to metatarsal fracture, foot sprain, tendinitis, gout  ED Management:  MDM    Patient presents for emergent evaluation of acute left foot pain that poses a possible threat to life and/or bodily function.    In the ED patient found to have acute swelling noted to top of left foot with pain to palpation of the top of her  for radiating across with pain to the bottom of her foot with palpation.  Patient has no pain or swelling in the ankle.  Patient has full range of motion of the toes.  Patient has normal cap refill and sensation distally with a normal pedal pulse.  Patient's left foot warm to touch without surrounding erythema..    I ordered labs and personally reviewed them.  Labs significant for uric acid 3.7, CMP within normal limits except for sodium 134, anion gap 6, CBC WBC 10.37, RBC 4.92, hemoglobin 14.7, hematocrit 44.3.    I ordered X-rays and personally reviewed them and reviewed the radiologist interpretation.  Xray significant for negative left foot.      Discharge MDM  I discussed the patient presentation labs, X-rays findings with my attending Dr. Gonzáles.    Patient was managed in the ED with oral ibuprofen, Ace wrap by RN.  Patient refused crutches.    The response to treatment was decreased pain.    Patient was discharged in stable condition with close follow up with Orthopedics.  Detailed return precautions discussed to return to the ED for worsening pain, numbness and tingling in the foot, worsening swelling, fever, or any new or worsening concerns.  Patient states understanding.                        Clinical Impression:   Final diagnoses:  [M79.672] Left foot pain (Primary)        ED Disposition Condition    Discharge Stable          ED Prescriptions    None       Follow-up Information       Follow up With Specialties Details Why Contact Info Additional Information    Adis Mccann MD Orthopedic Surgery, Surgery, Sports Medicine Schedule an appointment as soon as possible for a visit  For recheck/continuing care [address]  [facility] [address]  [phone]       Pending sale to [facility] - Emergency Dept Emergency Medicine  If symptoms worsen [address] [facility] [address]  [phone] 1st floor             Rosalva Parikh NP  05/26/23 0922

## 2023-05-26 NOTE — DISCHARGE INSTRUCTIONS
Please elevate the extremity at home and do ice to the area.  Alternate Tylenol and ibuprofen as needed for pain.  Use the Ace wrap to help provide compression to the area to decrease pain and swelling.  Please follow up with Orthopedics as soon as possible for recheck.  Please return to the ED for worsening pain, numbness and tingling in the foot, fever, or any new or worsening concerns.

## 2023-10-31 ENCOUNTER — HOSPITAL ENCOUNTER (OUTPATIENT)
Dept: RADIOLOGY | Facility: HOSPITAL | Age: 53
Discharge: HOME | End: 2023-10-31
Attending: NURSE PRACTITIONER
Payer: COMMERCIAL

## 2023-10-31 DIAGNOSIS — Z12.31 BREAST CANCER SCREENING BY MAMMOGRAM: ICD-10-CM

## 2023-10-31 PROCEDURE — 77067 SCR MAMMO BI INCL CAD: CPT | Mod: TC,PO

## 2023-12-13 ENCOUNTER — OFFICE VISIT (OUTPATIENT)
Dept: ORTHOPEDICS | Facility: CLINIC | Age: 53
End: 2023-12-13
Payer: COMMERCIAL

## 2023-12-13 VITALS — WEIGHT: 200 LBS | BODY MASS INDEX: 33.32 KG/M2 | HEIGHT: 65 IN

## 2023-12-13 DIAGNOSIS — M51.36 DEGENERATION OF LUMBAR INTERVERTEBRAL DISC: ICD-10-CM

## 2023-12-13 DIAGNOSIS — M47.816 FACET ARTHRITIS, DEGENERATIVE, LUMBAR SPINE: ICD-10-CM

## 2023-12-13 DIAGNOSIS — M77.12 LATERAL EPICONDYLITIS OF LEFT ELBOW: Primary | ICD-10-CM

## 2023-12-13 DIAGNOSIS — M77.02 MEDIAL EPICONDYLITIS OF LEFT ELBOW: ICD-10-CM

## 2023-12-13 DIAGNOSIS — M54.16 RIGHT LUMBAR RADICULITIS: ICD-10-CM

## 2023-12-13 PROCEDURE — 3008F BODY MASS INDEX DOCD: CPT | Mod: CPTII,S$GLB,, | Performed by: PHYSICIAN ASSISTANT

## 2023-12-13 PROCEDURE — 1159F PR MEDICATION LIST DOCUMENTED IN MEDICAL RECORD: ICD-10-PCS | Mod: CPTII,S$GLB,, | Performed by: PHYSICIAN ASSISTANT

## 2023-12-13 PROCEDURE — 1159F MED LIST DOCD IN RCRD: CPT | Mod: CPTII,S$GLB,, | Performed by: PHYSICIAN ASSISTANT

## 2023-12-13 PROCEDURE — 99213 PR OFFICE/OUTPT VISIT, EST, LEVL III, 20-29 MIN: ICD-10-PCS | Mod: S$GLB,,, | Performed by: PHYSICIAN ASSISTANT

## 2023-12-13 PROCEDURE — 99213 OFFICE O/P EST LOW 20 MIN: CPT | Mod: S$GLB,,, | Performed by: PHYSICIAN ASSISTANT

## 2023-12-13 PROCEDURE — 3008F PR BODY MASS INDEX (BMI) DOCUMENTED: ICD-10-PCS | Mod: CPTII,S$GLB,, | Performed by: PHYSICIAN ASSISTANT

## 2023-12-13 RX ORDER — METHYLPREDNISOLONE 4 MG/1
TABLET ORAL
Qty: 21 EACH | Refills: 0 | Status: SHIPPED | OUTPATIENT
Start: 2023-12-13 | End: 2024-02-06 | Stop reason: ALTCHOICE

## 2023-12-13 NOTE — PROGRESS NOTES
[facility]  [address]  [place] [address]  Phone: [phone]   [phone]    Patient's PCP: Jeanie Orozco FNP-C  Referring Provider: No ref. provider found    Subjective:      Chief Complaint:   Chief Complaint   Patient presents with    Left Elbow - Pain     Left lateral and medial epicondylitis follow up. Received inj 04/24/23 which offered relief about 3 months.  7/10    Lumbar Spine - Pain     Right lumbar pain that radiates from her buttock to her knee. Keeps her awake at night. Denies any numbness or tingling. Is not constant        Past Medical History:   Diagnosis Date    Arthritis     Cervical cancer 1995    Depression     Insomnia     Tinnitus 2020       Past Surgical History:   Procedure Laterality Date    BREAST BIOPSY Right 11/8/2019    Procedure: BIOPSY, BREAST;  Surgeon: Roni Arias MD;  Location: [facility] OR;  Service: General;  Laterality: Right;    CERVIX REMOVAL      CYST REMOVAL Right 12/2/2020    Procedure: EXCISION, CYST;  Surgeon: Adis Mccann MD;  Location: [facility] OR;  Service: Orthopedics;  Laterality: Right;    HEMORRHOID SURGERY  2018    Dr Quintero    HYSTERECTOMY  1995    LAPAROSCOPIC CHOLECYSTECTOMY N/A 9/3/2018    Procedure: CHOLECYSTECTOMY, LAPAROSCOPIC;  Surgeon: Wilmer Degroot MD;  Location: [facility] OR;  Service: General;  Laterality: N/A;       Current Outpatient Medications   Medication Sig    FLUoxetine 20 MG capsule TAKE ONE CAPSULE BY MOUTH TWO TIMES A DAY    hydrOXYzine pamoate (VISTARIL) 25 MG Cap TAKE ONE CAPSULE BY MOUTH EVERY NIGHT AS NEEDED FOR INSOMNIA (Patient not taking: Reported on 12/13/2023)     No current facility-administered medications for this visit.       Review of patient's allergies indicates:  No Known Allergies    Family History   Problem Relation Age of Onset    Hypertension Mother     Lymphoma Father        Social History     Socioeconomic History    Marital status:    Tobacco Use    Smoking status:  Every Day     Current packs/day: 1.50     Average packs/day: 1.5 packs/day for 37.0 years (55.5 ttl pk-yrs)     Types: Cigarettes     Start date: 12/8/1986    Smokeless tobacco: Never   Substance and Sexual Activity    Alcohol use: Never    Drug use: Never       Prior to meeting with the patient I reviewed the medical chart in [facility]. This included reviewing the previous progress notes from our office, review of the patient's last appointment with their primary care provider, review of any visits to the emergency room, and review of any pain management appointments or procedures.    History of present illness:  53-year-old female, returns to clinic today for the left elbow.  We have been treating her now for last year and a half or so for epicondylitis.  She has had bouts of both medial and lateral epicondylitis.  She is done shots, she has her strap she has has a strap with her today in her purse states that she has been wearing it.  She is also done physical therapy.  She has had 3 injections over the last 18 months.  Pain has returned.  Worse today over the medial but she still has lateral pain as well.    New complaint today of low back pain with radiation into the right lower back in the hip and buttocks region radiating into the leg to the thigh or knee but not past the calf.      Review of Systems:    Constitutional: Negative for chills, fever and weight loss.   HENT: Negative for congestion.    Eyes: Negative for discharge and redness.   Respiratory: Negative for cough and shortness of breath.    Cardiovascular: Negative for chest pain.   Gastrointestinal: Negative for nausea and vomiting.   Musculoskeletal: See HPI.   Skin: Negative for rash.   Neurological: Negative for headaches.   Endo/Heme/Allergies: Does not bruise/bleed easily.   Psychiatric/Behavioral: The patient is not nervous/anxious.    All other systems reviewed and are negative.       Objective:      Physical Examination:    Vital Signs:  There  were no vitals filed for this visit.    Body mass index is 33.28 kg/m².    This a well-developed, well nourished patient in no acute distress.  They are alert and oriented and cooperative to examination.     Examination of the left elbow, the patient is tender over the medial and lateral epicondyle.  She has pain with resisted wrist extension and wrist flexion.  The most significant pain is over the medial epicondyle or with wrist flexion.  She has full range of motion in the elbow with flexion extension and pronation supination.       Pertinent New Results:        XRAY Report / Interpretation:   AP and lateral views of the lumbar spine taken today in the office demonstrate multilevel degenerative changes with disc space height loss at multiple levels.  She also has facet arthritic changes L3-S1.    AP pelvis taken today in the office does not demonstrate any significant hip arthrosis.  Femoral acetabular spaces are well-maintained.  Visualized soft tissues are normal.        Assessment:       1. Lateral epicondylitis of left elbow    2. Medial epicondylitis of left elbow    3. Right lumbar radiculitis    4. Degeneration of lumbar intervertebral disc    5. Facet arthritis, degenerative, lumbar spine      Plan:     Lateral epicondylitis of left elbow  -     MRI Elbow Joint Without Contrast Left; Future; Expected date: 12/13/2023    Medial epicondylitis of left elbow  -     MRI Elbow Joint Without Contrast Left; Future; Expected date: 12/13/2023    Right lumbar radiculitis  -     X-Ray Lumbar Spine Ap And Lateral  -     X-Ray Pelvis Routine AP  -     Ambulatory referral/consult to Physical/Occupational Therapy; Future; Expected date: 12/20/2023    Degeneration of lumbar intervertebral disc  -     X-Ray Lumbar Spine Ap And Lateral  -     X-Ray Pelvis Routine AP  -     Ambulatory referral/consult to Physical/Occupational Therapy; Future; Expected date: 12/20/2023    Facet arthritis, degenerative, lumbar spine  -      X-Ray Lumbar Spine Ap And Lateral  -     X-Ray Pelvis Routine AP  -     Ambulatory referral/consult to Physical/Occupational Therapy; Future; Expected date: 12/20/2023    Other orders  -     Cancel: X-Ray Hip 1 View Right (with Pelvis when performed)        Follow up for MRI Results left elbow.    Chronic elbow pain and symptoms of epicondylitis.  We have ordered an MRI of the left elbow to evaluate the tendon origin.  For her low back pain, symptoms most characteristic of lumbar radiculitis, sciatica.  We will start her on a Medrol Dosepak we will do some physical therapy for her back and we will see her back in a few weeks with the MRI results of her elbow to see how the back is doing.  At some point may consider MRI of the lumbar spine as well for potential referral for pain management intervention.    @ERI@  DAPHNIE Harper, PA-C    This note was created using [employer] voice recognition software that occasionally misinterprets words or phrases.

## 2023-12-18 ENCOUNTER — HOSPITAL ENCOUNTER (OUTPATIENT)
Dept: RADIOLOGY | Facility: HOSPITAL | Age: 53
Discharge: HOME | End: 2023-12-18
Attending: PHYSICIAN ASSISTANT
Payer: COMMERCIAL

## 2023-12-18 DIAGNOSIS — M77.02 MEDIAL EPICONDYLITIS OF LEFT ELBOW: ICD-10-CM

## 2023-12-18 DIAGNOSIS — M77.12 LATERAL EPICONDYLITIS OF LEFT ELBOW: ICD-10-CM

## 2023-12-18 PROCEDURE — 73221 MRI JOINT UPR EXTREM W/O DYE: CPT | Mod: TC,PO,LT

## 2024-01-09 ENCOUNTER — OFFICE VISIT (OUTPATIENT)
Dept: ORTHOPEDICS | Facility: CLINIC | Age: 54
End: 2024-01-09
Payer: COMMERCIAL

## 2024-01-09 VITALS — WEIGHT: 200 LBS | HEIGHT: 65 IN | BODY MASS INDEX: 33.32 KG/M2

## 2024-01-09 DIAGNOSIS — M77.12 LATERAL EPICONDYLITIS OF LEFT ELBOW: Primary | ICD-10-CM

## 2024-01-09 DIAGNOSIS — M77.02 MEDIAL EPICONDYLITIS OF LEFT ELBOW: ICD-10-CM

## 2024-01-09 PROCEDURE — 1160F RVW MEDS BY RX/DR IN RCRD: CPT | Mod: CPTII,S$GLB,, | Performed by: ORTHOPAEDIC SURGERY

## 2024-01-09 PROCEDURE — 3008F BODY MASS INDEX DOCD: CPT | Mod: CPTII,S$GLB,, | Performed by: ORTHOPAEDIC SURGERY

## 2024-01-09 PROCEDURE — 99213 OFFICE O/P EST LOW 20 MIN: CPT | Mod: S$GLB,,, | Performed by: ORTHOPAEDIC SURGERY

## 2024-01-09 PROCEDURE — 1159F MED LIST DOCD IN RCRD: CPT | Mod: CPTII,S$GLB,, | Performed by: ORTHOPAEDIC SURGERY

## 2024-01-09 NOTE — PROGRESS NOTES
[facility]    Subjective:     Chief Complaint:   Chief Complaint   Patient presents with    Left Elbow - Pain     Patient is here for MRI results of her Left Elbow, states her pain is the worse than her last visit.        Past Medical History:   Diagnosis Date    Arthritis     Cervical cancer 1995    Depression     Insomnia     Tinnitus 2020       Past Surgical History:   Procedure Laterality Date    BREAST BIOPSY Right 11/8/2019    Procedure: BIOPSY, BREAST;  Surgeon: Roni Arias MD;  Location: [facility] OR;  Service: General;  Laterality: Right;    CERVIX REMOVAL      CYST REMOVAL Right 12/2/2020    Procedure: EXCISION, CYST;  Surgeon: Adis Mccann MD;  Location: [facility] OR;  Service: Orthopedics;  Laterality: Right;    HEMORRHOID SURGERY  2018    Dr Quintero    HYSTERECTOMY  1995    LAPAROSCOPIC CHOLECYSTECTOMY N/A 9/3/2018    Procedure: CHOLECYSTECTOMY, LAPAROSCOPIC;  Surgeon: Wilmer Degroot MD;  Location: [facility];  Service: General;  Laterality: N/A;       Current Outpatient Medications   Medication Sig    FLUoxetine 20 MG capsule TAKE ONE CAPSULE BY MOUTH TWO TIMES A DAY    hydrOXYzine pamoate (VISTARIL) 25 MG Cap TAKE ONE CAPSULE BY MOUTH EVERY NIGHT AS NEEDED FOR INSOMNIA (Patient not taking: Reported on 12/13/2023)    methylPREDNISolone (MEDROL DOSEPACK) 4 mg tablet use as directed (Patient not taking: Reported on 1/9/2024)     No current facility-administered medications for this visit.       Review of patient's allergies indicates:  No Known Allergies    Family History   Problem Relation Age of Onset    Hypertension Mother     Lymphoma Father        Social History     Socioeconomic History    Marital status:    Tobacco Use    Smoking status: Every Day     Current packs/day: 1.50     Average packs/day: 1.5 packs/day for 37.1 years (55.6 ttl pk-yrs)     Types: Cigarettes     Start date: 12/8/1986    Smokeless tobacco: Never   Substance and Sexual Activity    Alcohol use: Never    Drug  use: Never       History of present illness:  Patient comes in today for continued evaluation of her left elbow.  Continues complain of severe pain over the medial epicondylar region.      Review of Systems:    Constitution: Negative for chills, fever, and sweats.  Negative for unexplained weight loss.    HENT:  Negative for headaches and blurry vision.    Cardiovascular:Negative for chest pain or irregular heart beat. Negative for hypertension.    Respiratory:  Negative for cough and shortness of breath.    Gastrointestinal: Negative for abdominal pain, heartburn, melena, nausea, and vomitting.    Genitourinary:  Negative bladder incontinence and dysuria.    Musculoskeletal:  See HPI for details.     Neurological: Negative for numbness.    Psychiatric/Behavioral: Negative for depression.  The patient is not nervous/anxious.      Endocrine: Negative for polyuria    Hematologic/Lymphatic: Negative for bleeding problem.  Does not bruise/bleed easily.    Skin: Negative for poor would healing and rash    Objective:      Physical Examination:    Vital Signs:  There were no vitals filed for this visit.    Body mass index is 33.28 kg/m².    This a well-developed, well nourished patient in no acute distress.  They are alert and oriented and cooperative to examination.        Patient is very tender over the medial epicondyle.  She is neurovascular intact.  No numbness and tingling in the fingers.  Pertinent New Results:    XRAY Report / Interpretation:       Assessment/Plan:      MRI of the left elbow is reviewed.  The patient has chronic medial epicondylitis.  The tendon appears to be intact.  She has had 2 years of conservative therapy including injections and therapy.  At this point I believe she has a operative candidate.  I am going to refer her to Hand surgery for evaluation of possible surgical treatment for her chronic medial epicondylitis      This note was created using Dragon voice recognition software that  occasionally misinterpreted phrases or words.

## 2024-01-29 ENCOUNTER — OFFICE VISIT (OUTPATIENT)
Dept: ORTHOPEDICS | Facility: CLINIC | Age: 54
End: 2024-01-29
Payer: COMMERCIAL

## 2024-01-29 VITALS — WEIGHT: 200 LBS | HEIGHT: 65 IN | BODY MASS INDEX: 33.32 KG/M2

## 2024-01-29 DIAGNOSIS — M77.02 MEDIAL EPICONDYLITIS OF LEFT ELBOW: Primary | ICD-10-CM

## 2024-01-29 DIAGNOSIS — M77.12 LATERAL EPICONDYLITIS OF LEFT ELBOW: ICD-10-CM

## 2024-01-29 DIAGNOSIS — M77.02 MEDIAL EPICONDYLITIS OF LEFT ELBOW: ICD-10-CM

## 2024-01-29 PROCEDURE — 99204 OFFICE O/P NEW MOD 45 MIN: CPT | Mod: S$GLB,,, | Performed by: ORTHOPAEDIC SURGERY

## 2024-01-29 PROCEDURE — 3008F BODY MASS INDEX DOCD: CPT | Mod: CPTII,S$GLB,, | Performed by: ORTHOPAEDIC SURGERY

## 2024-01-29 PROCEDURE — 1159F MED LIST DOCD IN RCRD: CPT | Mod: CPTII,S$GLB,, | Performed by: ORTHOPAEDIC SURGERY

## 2024-01-29 PROCEDURE — 99999 PR PBB SHADOW E&M-EST. PATIENT-LVL III: CPT | Mod: PBBFAC,,, | Performed by: ORTHOPAEDIC SURGERY

## 2024-01-29 RX ORDER — MUPIROCIN 20 MG/G
OINTMENT TOPICAL
Status: CANCELLED | OUTPATIENT
Start: 2024-01-29

## 2024-01-29 RX ORDER — CEFAZOLIN SODIUM 2 G/50ML
2 SOLUTION INTRAVENOUS
Status: CANCELLED | OUTPATIENT
Start: 2024-01-29

## 2024-01-29 NOTE — H&P (VIEW-ONLY)
1/29/2024    Chief Complaint:  Chief Complaint   Patient presents with    Left Elbow - Pain       HPI:  Gi Abdullahi is a 53 y.o. female, who presents to clinic today she has a history of left elbow pain.  She was noted have medial epicondylitis.  This has been going on over 2 years.  She has been injected several times.  She did have temporary relief with the injection but it continues to reoccur.  She was seen by Dr. Mccann who referred her to me for further evaluation and assessment.    PMHX:  Past Medical History:   Diagnosis Date    Arthritis     Cervical cancer 1995    Depression     Insomnia     Tinnitus 2020       PSHX:  Past Surgical History:   Procedure Laterality Date    BREAST BIOPSY Right 11/8/2019    Procedure: BIOPSY, BREAST;  Surgeon: Roni Arias MD;  Location: [facility] OR;  Service: General;  Laterality: Right;    CERVIX REMOVAL      CYST REMOVAL Right 12/2/2020    Procedure: EXCISION, CYST;  Surgeon: Adis Mccann MD;  Location: [facility] OR;  Service: Orthopedics;  Laterality: Right;    HEMORRHOID SURGERY  2018    Dr Quintero    HYSTERECTOMY  1995    LAPAROSCOPIC CHOLECYSTECTOMY N/A 9/3/2018    Procedure: CHOLECYSTECTOMY, LAPAROSCOPIC;  Surgeon: Wilmer Degroot MD;  Location: [facility] OR;  Service: General;  Laterality: N/A;       FMHX:  Family History   Problem Relation Age of Onset    Hypertension Mother     Lymphoma Father        SOCHX:  Social History     Tobacco Use    Smoking status: Every Day     Current packs/day: 1.50     Average packs/day: 1.5 packs/day for 37.1 years (55.7 ttl pk-yrs)     Types: Cigarettes     Start date: 12/8/1986    Smokeless tobacco: Never   Substance Use Topics    Alcohol use: Never       ALLERGIES:  Patient has no known allergies.    CURRENT MEDICATIONS:  Current Outpatient Medications on File Prior to Visit   Medication Sig Dispense Refill    FLUoxetine 20 MG capsule TAKE ONE CAPSULE BY MOUTH TWO TIMES A DAY 60 capsule 2    hydrOXYzine pamoate (VISTARIL) 25 MG Cap  "TAKE ONE CAPSULE BY MOUTH EVERY NIGHT AS NEEDED FOR INSOMNIA (Patient not taking: Reported on 12/13/2023) 30 capsule 2    methylPREDNISolone (MEDROL DOSEPACK) 4 mg tablet use as directed (Patient not taking: Reported on 1/9/2024) 21 each 0     No current facility-administered medications on file prior to visit.       REVIEW OF SYSTEMS:  ROS    GENERAL PHYSICAL EXAM:   Ht 5' 5" (1.651 m)   Wt 90.7 kg (200 lb)   BMI 33.28 kg/m²    GEN: well developed, well nourished, no acute distress   HENT: Normocephalic, atraumatic   EYES: No discharge, conjunctiva normal   NECK: Supple, non-tender   PULM: No wheezing, no respiratory distress   CV: RRR   ABD: Soft, non-tender    ORTHO EXAM:   Examination of the left elbow reveals that there is no edema.  There are no major skin changes.  Palpation does produce significant tenderness over the medial epicondyle and common flexor origin.  There was very mild tenderness over lateral epicondyle as well.  Range of motion of the elbow is 0-120 degrees.  She is able to pronate and supinate.  Further flexion is possible but it is painful.  She does have sensation intact in the median radial ulnar distribution.  Capillary refill is less than 2 seconds in all digits.    RADIOLOGY:   X-rays and MRI of the left elbow have been reviewed.  It is consistent with moderate to high-grade medial epicondylitis/common flexor origin tendinitis.  There is also some lateral epicondylitis noted as well.  The articular surface looks to be intact without significant cartilaginous injuries.    ASSESSMENT:   Left medial epicondylitis    PLAN:  1. I did review the treatment options with the patient.  She has attempted all nonoperative treatments which have not been successful.  Have discussed the possibility of debridement of the common flexor origin at the medial epicondyle.  After discussion of the risks and benefits of the procedure consent been obtained    2. Will proceed with left medial epicondyle " debridement under general anesthesia    3.  She will follow up with me 2 weeks postoperatively

## 2024-01-29 NOTE — PROGRESS NOTES
1/29/2024    Chief Complaint:  Chief Complaint   Patient presents with    Left Elbow - Pain       HPI:  Gi Abdullahi is a 53 y.o. female, who presents to clinic today she has a history of left elbow pain.  She was noted have medial epicondylitis.  This has been going on over 2 years.  She has been injected several times.  She did have temporary relief with the injection but it continues to reoccur.  She was seen by Dr. Mccann who referred her to me for further evaluation and assessment.    PMHX:  Past Medical History:   Diagnosis Date    Arthritis     Cervical cancer 1995    Depression     Insomnia     Tinnitus 2020       PSHX:  Past Surgical History:   Procedure Laterality Date    BREAST BIOPSY Right 11/8/2019    Procedure: BIOPSY, BREAST;  Surgeon: Roni Arias MD;  Location: [facility] OR;  Service: General;  Laterality: Right;    CERVIX REMOVAL      CYST REMOVAL Right 12/2/2020    Procedure: EXCISION, CYST;  Surgeon: Adis Mccann MD;  Location: [facility] OR;  Service: Orthopedics;  Laterality: Right;    HEMORRHOID SURGERY  2018    Dr Quintero    HYSTERECTOMY  1995    LAPAROSCOPIC CHOLECYSTECTOMY N/A 9/3/2018    Procedure: CHOLECYSTECTOMY, LAPAROSCOPIC;  Surgeon: Wilmer Degroot MD;  Location: [facility] OR;  Service: General;  Laterality: N/A;       FMHX:  Family History   Problem Relation Age of Onset    Hypertension Mother     Lymphoma Father        SOCHX:  Social History     Tobacco Use    Smoking status: Every Day     Current packs/day: 1.50     Average packs/day: 1.5 packs/day for 37.1 years (55.7 ttl pk-yrs)     Types: Cigarettes     Start date: 12/8/1986    Smokeless tobacco: Never   Substance Use Topics    Alcohol use: Never       ALLERGIES:  Patient has no known allergies.    CURRENT MEDICATIONS:  Current Outpatient Medications on File Prior to Visit   Medication Sig Dispense Refill    FLUoxetine 20 MG capsule TAKE ONE CAPSULE BY MOUTH TWO TIMES A DAY 60 capsule 2    hydrOXYzine pamoate (VISTARIL) 25 MG Cap  "TAKE ONE CAPSULE BY MOUTH EVERY NIGHT AS NEEDED FOR INSOMNIA (Patient not taking: Reported on 12/13/2023) 30 capsule 2    methylPREDNISolone (MEDROL DOSEPACK) 4 mg tablet use as directed (Patient not taking: Reported on 1/9/2024) 21 each 0     No current facility-administered medications on file prior to visit.       REVIEW OF SYSTEMS:  ROS    GENERAL PHYSICAL EXAM:   Ht 5' 5" (1.651 m)   Wt 90.7 kg (200 lb)   BMI 33.28 kg/m²    GEN: well developed, well nourished, no acute distress   HENT: Normocephalic, atraumatic   EYES: No discharge, conjunctiva normal   NECK: Supple, non-tender   PULM: No wheezing, no respiratory distress   CV: RRR   ABD: Soft, non-tender    ORTHO EXAM:   Examination of the left elbow reveals that there is no edema.  There are no major skin changes.  Palpation does produce significant tenderness over the medial epicondyle and common flexor origin.  There was very mild tenderness over lateral epicondyle as well.  Range of motion of the elbow is 0-120 degrees.  She is able to pronate and supinate.  Further flexion is possible but it is painful.  She does have sensation intact in the median radial ulnar distribution.  Capillary refill is less than 2 seconds in all digits.    RADIOLOGY:   X-rays and MRI of the left elbow have been reviewed.  It is consistent with moderate to high-grade medial epicondylitis/common flexor origin tendinitis.  There is also some lateral epicondylitis noted as well.  The articular surface looks to be intact without significant cartilaginous injuries.    ASSESSMENT:   Left medial epicondylitis    PLAN:  1. I did review the treatment options with the patient.  She has attempted all nonoperative treatments which have not been successful.  Have discussed the possibility of debridement of the common flexor origin at the medial epicondyle.  After discussion of the risks and benefits of the procedure consent been obtained    2. Will proceed with left medial epicondyle " debridement under general anesthesia    3.  She will follow up with me 2 weeks postoperatively

## 2024-01-29 NOTE — PATIENT INSTRUCTIONS
Surgery Instructions:     Your surgery is scheduled on 02/08/24 at the surgery center: [address] 1st floor, second entrance.    The pre-op department will be in contact with you prior to your procedure to review medications and instructions.       Nothing to eat or drink after midnight prior to day of surgery.    The surgery center will contact you the day prior to surgery to advise you of your arrival time for surgery.     Your post op appointment is scheduled on 02/21/24 @ 10:40am.      
Statement Selected

## 2024-02-07 ENCOUNTER — ANESTHESIA EVENT (OUTPATIENT)
Dept: SURGERY | Facility: HOSPITAL | Age: 54
End: 2024-02-07
Payer: COMMERCIAL

## 2024-02-08 ENCOUNTER — ANESTHESIA (OUTPATIENT)
Dept: SURGERY | Facility: HOSPITAL | Age: 54
End: 2024-02-08
Payer: COMMERCIAL

## 2024-02-08 ENCOUNTER — HOSPITAL ENCOUNTER (OUTPATIENT)
Dept: RADIOLOGY | Facility: HOSPITAL | Age: 54
Discharge: HOME | End: 2024-02-08
Attending: ORTHOPAEDIC SURGERY
Payer: COMMERCIAL

## 2024-02-08 ENCOUNTER — HOSPITAL ENCOUNTER (OUTPATIENT)
Facility: HOSPITAL | Age: 54
Discharge: HOME | End: 2024-02-08
Attending: ORTHOPAEDIC SURGERY | Admitting: ORTHOPAEDIC SURGERY
Payer: COMMERCIAL

## 2024-02-08 VITALS
SYSTOLIC BLOOD PRESSURE: 118 MMHG | OXYGEN SATURATION: 99 % | RESPIRATION RATE: 16 BRPM | BODY MASS INDEX: 36.65 KG/M2 | HEART RATE: 88 BPM | TEMPERATURE: 98 F | HEIGHT: 65 IN | DIASTOLIC BLOOD PRESSURE: 57 MMHG | WEIGHT: 220 LBS

## 2024-02-08 DIAGNOSIS — M77.02 MEDIAL EPICONDYLITIS OF LEFT ELBOW: ICD-10-CM

## 2024-02-08 DIAGNOSIS — M25.522 LEFT ELBOW PAIN: ICD-10-CM

## 2024-02-08 PROCEDURE — C1713 ANCHOR/SCREW BN/BN,TIS/BN: HCPCS | Mod: PO | Performed by: ORTHOPAEDIC SURGERY

## 2024-02-08 PROCEDURE — 76000 FLUOROSCOPY <1 HR PHYS/QHP: CPT | Mod: TC,PO

## 2024-02-08 PROCEDURE — 63600175 PHARM REV CODE 636 W HCPCS: Mod: PO | Performed by: PHYSICIAN ASSISTANT

## 2024-02-08 PROCEDURE — 71000015 HC POSTOP RECOV 1ST HR: Mod: PO | Performed by: ORTHOPAEDIC SURGERY

## 2024-02-08 PROCEDURE — 63600175 PHARM REV CODE 636 W HCPCS: Mod: PO | Performed by: ANESTHESIOLOGY

## 2024-02-08 PROCEDURE — 63600175 PHARM REV CODE 636 W HCPCS: Mod: PO | Performed by: NURSE ANESTHETIST, CERTIFIED REGISTERED

## 2024-02-08 PROCEDURE — 71000033 HC RECOVERY, INTIAL HOUR: Mod: PO | Performed by: ORTHOPAEDIC SURGERY

## 2024-02-08 PROCEDURE — 63600175 PHARM REV CODE 636 W HCPCS: Mod: JZ,JG,PO | Performed by: ORTHOPAEDIC SURGERY

## 2024-02-08 PROCEDURE — D9220A PRA ANESTHESIA: Mod: CRNA,,, | Performed by: NURSE ANESTHETIST, CERTIFIED REGISTERED

## 2024-02-08 PROCEDURE — 36000707: Mod: PO | Performed by: ORTHOPAEDIC SURGERY

## 2024-02-08 PROCEDURE — 25000003 PHARM REV CODE 250: Mod: PO | Performed by: ANESTHESIOLOGY

## 2024-02-08 PROCEDURE — 37000008 HC ANESTHESIA 1ST 15 MINUTES: Mod: PO | Performed by: ORTHOPAEDIC SURGERY

## 2024-02-08 PROCEDURE — 36000706: Mod: PO | Performed by: ORTHOPAEDIC SURGERY

## 2024-02-08 PROCEDURE — D9220A PRA ANESTHESIA: Mod: ANES,,, | Performed by: ANESTHESIOLOGY

## 2024-02-08 PROCEDURE — 37000009 HC ANESTHESIA EA ADD 15 MINS: Mod: PO | Performed by: ORTHOPAEDIC SURGERY

## 2024-02-08 PROCEDURE — 25000003 PHARM REV CODE 250: Mod: PO | Performed by: PHYSICIAN ASSISTANT

## 2024-02-08 PROCEDURE — 25000003 PHARM REV CODE 250: Mod: PO | Performed by: NURSE ANESTHETIST, CERTIFIED REGISTERED

## 2024-02-08 PROCEDURE — 24359 REPAIR ELBOW DEB/ATTCH OPEN: CPT | Mod: LT,,, | Performed by: ORTHOPAEDIC SURGERY

## 2024-02-08 PROCEDURE — 25000003 PHARM REV CODE 250: Mod: PO | Performed by: ORTHOPAEDIC SURGERY

## 2024-02-08 DEVICE — ANCHOR SUT CORKSCREW: Type: IMPLANTABLE DEVICE | Site: ELBOW | Status: FUNCTIONAL

## 2024-02-08 RX ORDER — MUPIROCIN 20 MG/G
OINTMENT TOPICAL 2 TIMES DAILY
Status: DISCONTINUED | OUTPATIENT
Start: 2024-02-08 | End: 2024-02-08 | Stop reason: HOSPADM

## 2024-02-08 RX ORDER — LIDOCAINE HYDROCHLORIDE 20 MG/ML
INJECTION INTRAVENOUS
Status: DISCONTINUED | OUTPATIENT
Start: 2024-02-08 | End: 2024-02-08

## 2024-02-08 RX ORDER — ONDANSETRON HYDROCHLORIDE 2 MG/ML
4 INJECTION, SOLUTION INTRAVENOUS DAILY PRN
Status: DISCONTINUED | OUTPATIENT
Start: 2024-02-08 | End: 2024-02-08 | Stop reason: HOSPADM

## 2024-02-08 RX ORDER — PROPOFOL 10 MG/ML
VIAL (ML) INTRAVENOUS
Status: DISCONTINUED | OUTPATIENT
Start: 2024-02-08 | End: 2024-02-08

## 2024-02-08 RX ORDER — SODIUM CHLORIDE, SODIUM LACTATE, POTASSIUM CHLORIDE, CALCIUM CHLORIDE 600; 310; 30; 20 MG/100ML; MG/100ML; MG/100ML; MG/100ML
INJECTION, SOLUTION INTRAVENOUS CONTINUOUS
Status: DISCONTINUED | OUTPATIENT
Start: 2024-02-08 | End: 2024-02-08 | Stop reason: HOSPADM

## 2024-02-08 RX ORDER — LIDOCAINE HYDROCHLORIDE 10 MG/ML
INJECTION, SOLUTION EPIDURAL; INFILTRATION; INTRACAUDAL; PERINEURAL
Status: DISCONTINUED | OUTPATIENT
Start: 2024-02-08 | End: 2024-02-08 | Stop reason: HOSPADM

## 2024-02-08 RX ORDER — BUPIVACAINE HYDROCHLORIDE 2.5 MG/ML
INJECTION, SOLUTION EPIDURAL; INFILTRATION; INTRACAUDAL
Status: DISCONTINUED | OUTPATIENT
Start: 2024-02-08 | End: 2024-02-08 | Stop reason: HOSPADM

## 2024-02-08 RX ORDER — FENTANYL CITRATE 50 UG/ML
INJECTION, SOLUTION INTRAMUSCULAR; INTRAVENOUS
Status: DISCONTINUED | OUTPATIENT
Start: 2024-02-08 | End: 2024-02-08

## 2024-02-08 RX ORDER — SODIUM CHLORIDE, SODIUM LACTATE, POTASSIUM CHLORIDE, CALCIUM CHLORIDE 600; 310; 30; 20 MG/100ML; MG/100ML; MG/100ML; MG/100ML
125 INJECTION, SOLUTION INTRAVENOUS CONTINUOUS
Status: DISCONTINUED | OUTPATIENT
Start: 2024-02-08 | End: 2024-02-08 | Stop reason: HOSPADM

## 2024-02-08 RX ORDER — CEFAZOLIN SODIUM 2 G/50ML
2 SOLUTION INTRAVENOUS
Status: COMPLETED | OUTPATIENT
Start: 2024-02-08 | End: 2024-02-08

## 2024-02-08 RX ORDER — OXYCODONE HYDROCHLORIDE 5 MG/1
5 TABLET ORAL EVERY 4 HOURS PRN
Qty: 12 TABLET | Refills: 0 | Status: SHIPPED | OUTPATIENT
Start: 2024-02-08 | End: 2024-03-06

## 2024-02-08 RX ORDER — FENTANYL CITRATE 50 UG/ML
25 INJECTION, SOLUTION INTRAMUSCULAR; INTRAVENOUS EVERY 5 MIN PRN
Status: COMPLETED | OUTPATIENT
Start: 2024-02-08 | End: 2024-02-08

## 2024-02-08 RX ORDER — ACETAMINOPHEN 10 MG/ML
INJECTION, SOLUTION INTRAVENOUS
Status: DISCONTINUED | OUTPATIENT
Start: 2024-02-08 | End: 2024-02-08

## 2024-02-08 RX ORDER — MIDAZOLAM HYDROCHLORIDE 1 MG/ML
INJECTION INTRAMUSCULAR; INTRAVENOUS
Status: DISCONTINUED | OUTPATIENT
Start: 2024-02-08 | End: 2024-02-08

## 2024-02-08 RX ORDER — ONDANSETRON 8 MG/1
8 TABLET, ORALLY DISINTEGRATING ORAL EVERY 8 HOURS PRN
Qty: 2 TABLET | Refills: 0 | Status: SHIPPED | OUTPATIENT
Start: 2024-02-08 | End: 2024-03-06

## 2024-02-08 RX ORDER — LIDOCAINE HYDROCHLORIDE 10 MG/ML
1 INJECTION, SOLUTION EPIDURAL; INFILTRATION; INTRACAUDAL; PERINEURAL ONCE
Status: DISCONTINUED | OUTPATIENT
Start: 2024-02-08 | End: 2024-02-08 | Stop reason: HOSPADM

## 2024-02-08 RX ORDER — ONDANSETRON HYDROCHLORIDE 2 MG/ML
INJECTION, SOLUTION INTRAVENOUS
Status: DISCONTINUED | OUTPATIENT
Start: 2024-02-08 | End: 2024-02-08

## 2024-02-08 RX ORDER — OXYCODONE HYDROCHLORIDE 5 MG/1
5 TABLET ORAL ONCE AS NEEDED
Status: COMPLETED | OUTPATIENT
Start: 2024-02-08 | End: 2024-02-08

## 2024-02-08 RX ORDER — MUPIROCIN 20 MG/G
OINTMENT TOPICAL
Status: DISCONTINUED | OUTPATIENT
Start: 2024-02-08 | End: 2024-02-08 | Stop reason: HOSPADM

## 2024-02-08 RX ADMIN — ONDANSETRON 4 MG: 2 INJECTION, SOLUTION INTRAMUSCULAR; INTRAVENOUS at 09:02

## 2024-02-08 RX ADMIN — FENTANYL CITRATE 25 MCG: 50 INJECTION INTRAMUSCULAR; INTRAVENOUS at 11:02

## 2024-02-08 RX ADMIN — ACETAMINOPHEN 1000 MG: 10 INJECTION, SOLUTION INTRAVENOUS at 09:02

## 2024-02-08 RX ADMIN — MUPIROCIN: 20 OINTMENT TOPICAL at 08:02

## 2024-02-08 RX ADMIN — FENTANYL CITRATE 50 MCG: 50 INJECTION, SOLUTION INTRAMUSCULAR; INTRAVENOUS at 09:02

## 2024-02-08 RX ADMIN — FENTANYL CITRATE 25 MCG: 50 INJECTION INTRAMUSCULAR; INTRAVENOUS at 10:02

## 2024-02-08 RX ADMIN — MIDAZOLAM HYDROCHLORIDE 2 MG: 1 INJECTION, SOLUTION INTRAMUSCULAR; INTRAVENOUS at 09:02

## 2024-02-08 RX ADMIN — FENTANYL CITRATE 25 MCG: 50 INJECTION, SOLUTION INTRAMUSCULAR; INTRAVENOUS at 09:02

## 2024-02-08 RX ADMIN — OXYCODONE HYDROCHLORIDE 5 MG: 5 TABLET ORAL at 11:02

## 2024-02-08 RX ADMIN — LIDOCAINE HYDROCHLORIDE 75 MG: 20 INJECTION INTRAVENOUS at 09:02

## 2024-02-08 RX ADMIN — SODIUM CHLORIDE, POTASSIUM CHLORIDE, SODIUM LACTATE AND CALCIUM CHLORIDE: 600; 310; 30; 20 INJECTION, SOLUTION INTRAVENOUS at 08:02

## 2024-02-08 RX ADMIN — PROPOFOL 150 MG: 10 INJECTION, EMULSION INTRAVENOUS at 09:02

## 2024-02-08 RX ADMIN — CEFAZOLIN SODIUM 2 G: 2 SOLUTION INTRAVENOUS at 09:02

## 2024-02-08 NOTE — DISCHARGE INSTRUCTIONS
Procedure:  Left elbow medial epicondyle debridement    1. Keep the splint clean, dry, and in place until follow-up. Do not take it off and do not get it wet.    2. Please keep the left upper extremity elevated for the 1st 24-48 hours to prevent further swelling    3. Flexion and extension of the exposed fingers is allowed but do not attempt to lift or push off with the left arm or hand    4. Pain medication and nausea medication have been prescribed. Please take them as necessary.    5. If there are any questions or concerns please call Dr. Galdamez's office at [phone]    6. Follow-up with Dr. Galdamez in 2 weeks

## 2024-02-08 NOTE — ANESTHESIA PREPROCEDURE EVALUATION
02/08/2024  Gi Abdullahi is a 53 y.o., female.      Pre-op Assessment    I have reviewed the Patient Summary Reports.     I have reviewed the Nursing Notes. I have reviewed the NPO Status.   I have reviewed the Medications.     Review of Systems  Anesthesia Hx:  No problems with previous Anesthesia                Social:  Smoker       Cardiovascular:                hyperlipidemia                             Endocrine:        Obesity / BMI > 30  Psych:  Psychiatric History  depression                Physical Exam  General: Well nourished    Airway:  Mallampati: II   Mouth Opening: Normal  TM Distance: Normal  Neck ROM: Normal ROM        Anesthesia Plan  Type of Anesthesia, risks & benefits discussed:    Anesthesia Type: Gen ETT, Gen Supraglottic Airway  Intra-op Monitoring Plan: Standard ASA Monitors  Post Op Pain Control Plan: multimodal analgesia  Induction:  IV  Airway Plan: Video  Informed Consent: Informed consent signed with the Patient and all parties understand the risks and agree with anesthesia plan.  All questions answered.   ASA Score: 2    Ready For Surgery From Anesthesia Perspective.     .

## 2024-02-08 NOTE — OP NOTE
Gi Abdullahi  1970    DATE OF SURGERY: 2/8/2024     PRE-OPERATIVE DIAGNOSIS:  Left elbow medial epicondylitis    POST-OPERATIVE DIAGNOSIS:  Left elbow medial epicondylitis     ANESTHESIA TYPE:  General    BLOOD LOSS:  Less than 10 cc    TOURNIQUET TIME:  21 minutes    SURGEON: Dr Galdamez    ASSISTANT:  Lucia Lincoln    PROCEDURE:  Left elbow medial epicondylitis debridement    IMPLANTS:  Arthrex mini corkscrew anchor x1     SPECIMENS:  None    INDICATION:     Ms. Abdullahi has a history of recurrent left medial epicondylitis.  She was treated with therapy, anti-inflammatories, steroid injections.  She continued to have recurrence of pain and dysfunction.  At that point I had a discussion with the patient about the risks and benefits of the debridement of the common flexor origin at the medial epicondyle.  Informed consent was then obtained    PROCEDURE IN DETAIL:     Ms. Abdullahi was transported to the operating room and was placed supine on the operating room table. All appropriate points were padded. The left arm and hand was prepped and draped in the normal sterile fashion. Time out was called. The correct patient, correct operative site, correct procedure, antibiotic administration which consisted of 2 g of Ancef, and allergies to medications which are to Patient has no known allergies.  were reviewed. Time in was then called.     Attention was turned to the left elbow.  A 4 cm incision was made over the medial epicondyle and common flexor origin.  The incision was carried through the skin.  Subcutaneous tissues were dissected with tenotomy scissors.  The ulnar nerve was identified.  It was protected throughout the case.  A longitudinal split through the midportion of the medial epicondyle attachment site of the flexor origin was performed.  The flexor origin was elevated.  There was noted to be a significant portion of the tendon which had fibrinous degeneration.  The unhealthy portion of the common flexor  tendon was debrided sharply with a scalpel.  Curette was used to create a reattachment point and healthy bone at the medial epicondyle.  Approximately 1/4 of the width of the tendon was resected.  The wound site was then copiously irrigated.  An Arthrex mini suture anchor was then placed into the medial epicondyle at the attachment site.  Michael-Cornelius suture configuration was used to repair of the common flexor origin back to the medial epicondyle.  The remainder of the flexor origin was repaired with 0 Vicryl suture.  Fluoroscopic images were obtained.  There was noted be excellent positioning of the anchor within the medial epicondyle.  At this point the tourniquet was let down and hemostasis was obtained.  The wound was closed superficially with combination of 3-0 Vicryl subcutaneous sutures and 3-0 nylon superficial sutures.  The wound was dressed with Xeroform, gauze padding, cast padding and a long-arm posterior splint was placed    The patient was awakened from anesthesia and was transported to the recovery room in stable condition. All lap, needle, sponge, and equipment counts were correct at the end of the case.    POST-OPERATIVE PLAN:     Patient will keep the splint in place full time for 2 weeks which time she will follow up with me.  Will continue to keep her limited activity and weight-bearing for a total of 6 weeks to allow for healing of the common flexor origin.

## 2024-02-08 NOTE — ANESTHESIA PROCEDURE NOTES
Intubation    Date/Time: 2/8/2024 9:23 AM    Performed by: Camryn Fleming CRNA  Authorized by: Javier Jaramillo MD    Intubation:     Induction:  Intravenous    Intubated:  Postinduction    Mask Ventilation:  Easy mask    Attempts:  1    Attempted By:  CRNA    Method of Intubation:  Other (see comments)    Difficult Airway Encountered?: No      Complications:  None    Airway Device:  Supraglottic airway/LMA    Airway Device Size:  4.0    Style/Cuff Inflation:  Cuffed    Inflation Amount (mL):  5    Secured at:  The lips    Placement Verified By:  Capnometry    Complicating Factors:  None

## 2024-02-08 NOTE — DISCHARGE SUMMARY
[facility] - Surgery  Discharge Note  Short Stay    Procedure(s) (LRB):  Left medial epicondyle debridement (Left)      OUTCOME: Patient tolerated treatment/procedure well without complication and is now ready for discharge.    DISPOSITION: Home or Self Care    FINAL DIAGNOSIS:  Medial epicondylitis of left elbow    FOLLOWUP: In clinic    DISCHARGE INSTRUCTIONS:    Discharge Procedure Orders   SLING ORTHOPEDIC LARGE FOR HOME USE     Diet general     Activity as tolerated     Keep surgical extremity elevated     Lifting restrictions   Order Comments: Please do not lift or push off with the left arm or hand     Leave dressing on - Keep it clean, dry, and intact until clinic visit     Call MD for:  temperature >100.4     Call MD for:  persistent nausea and vomiting     Call MD for:  severe uncontrolled pain     Call MD for:  difficulty breathing, headache or visual disturbances     Call MD for:  redness, tenderness, or signs of infection (pain, swelling, redness, odor or green/yellow discharge around incision site)     Call MD for:  hives     Call MD for:  persistent dizziness or light-headedness     Call MD for:  extreme fatigue        TIME SPENT ON DISCHARGE: 15 minutes

## 2024-02-08 NOTE — ANESTHESIA POSTPROCEDURE EVALUATION
Anesthesia Post Evaluation    Patient: Gi Abdullahi    Procedure(s) Performed: Procedure(s) (LRB):  Left medial epicondyle debridement (Left)    Final Anesthesia Type: general      Patient location during evaluation: PACU  Patient participation: Yes- Able to Participate  Level of consciousness: awake  Post-procedure vital signs: reviewed and stable  Pain management: adequate  Airway patency: patent    PONV status at discharge: No PONV  Anesthetic complications: no      Cardiovascular status: blood pressure returned to baseline  Respiratory status: unassisted  Hydration status: euvolemic  Follow-up not needed.              Vitals Value Taken Time   /57 02/08/24 1130   Temp 36.7 °C (98 °F) 02/08/24 1027   Pulse 88 02/08/24 1130   Resp 16 02/08/24 1130   SpO2 99 % 02/08/24 1130         Event Time   Out of Recovery 11:05:00         Pain/Yahir Score: Pain Rating Prior to Med Admin: 4 (2/8/2024 11:15 AM)  Pain Rating Post Med Admin: 3 (2/8/2024 11:30 AM)  Yahir Score: 10 (2/8/2024 11:30 AM)

## 2024-02-08 NOTE — TRANSFER OF CARE
"Anesthesia Transfer of Care Note    Patient: Gi Abdullahi    Procedure(s) Performed: Procedure(s) (LRB):  Left medial epicondyle debridement (Left)    Patient location: PACU    Anesthesia Type: general    Transport from OR: Transported from OR on room air with adequate spontaneous ventilation    Post pain: adequate analgesia    Post assessment: no apparent anesthetic complications and tolerated procedure well    Post vital signs: stable    Level of consciousness: lethargic and responds to stimulation    Nausea/Vomiting: no nausea/vomiting    Complications: none    Transfer of care protocol was followed      Last vitals: Visit Vitals  /67 (BP Location: Right arm, Patient Position: Sitting)   Pulse 79   Temp 36.2 °C (97.2 °F) (Skin)   Resp 18   Ht 5' 5" (1.651 m)   Wt 99.8 kg (220 lb)   SpO2 100%   Breastfeeding No   BMI 36.61 kg/m²     "

## 2024-02-14 ENCOUNTER — PATIENT MESSAGE (OUTPATIENT)
Dept: ORTHOPEDICS | Facility: CLINIC | Age: 54
End: 2024-02-14
Payer: COMMERCIAL

## 2024-02-21 ENCOUNTER — OFFICE VISIT (OUTPATIENT)
Dept: ORTHOPEDICS | Facility: CLINIC | Age: 54
End: 2024-02-21
Payer: COMMERCIAL

## 2024-02-21 VITALS — HEIGHT: 65 IN | BODY MASS INDEX: 36.65 KG/M2 | WEIGHT: 220 LBS

## 2024-02-21 DIAGNOSIS — M77.02 MEDIAL EPICONDYLITIS OF LEFT ELBOW: Primary | ICD-10-CM

## 2024-02-21 PROCEDURE — 99024 POSTOP FOLLOW-UP VISIT: CPT | Mod: S$GLB,,, | Performed by: ORTHOPAEDIC SURGERY

## 2024-02-21 PROCEDURE — 99999 PR PBB SHADOW E&M-EST. PATIENT-LVL III: CPT | Mod: PBBFAC,,, | Performed by: ORTHOPAEDIC SURGERY

## 2024-02-21 PROCEDURE — 1159F MED LIST DOCD IN RCRD: CPT | Mod: CPTII,S$GLB,, | Performed by: ORTHOPAEDIC SURGERY

## 2024-02-21 NOTE — PROGRESS NOTES
Ms Abdullahi returns to clinic today.  She is approximately 2 weeks status post left medial epicondylitis debridement.  She is overall doing well.  She is still having some soreness.      Physical exam:  Examination left elbow reveals the incision is healing well.  There are sutures in place.  There is only mild edema.  She does report grossly intact sensation has capillary refill less than 2 seconds     Assessment:  Status post left elbow medial epicondylitis debridement     Plan:    1. Sutures were removed today and Steri-Strips are placed    2. I did place her into a Velcro wrist splint    3.  She will continue to wear the sling     4.  She can begin very gentle range of motion against the air but she was not to perform any weight-bearing activities    5.  She will follow up in 2 weeks for repeat evaluation

## 2024-03-06 ENCOUNTER — OFFICE VISIT (OUTPATIENT)
Dept: ORTHOPEDICS | Facility: CLINIC | Age: 54
End: 2024-03-06
Payer: COMMERCIAL

## 2024-03-06 DIAGNOSIS — Z98.890 STATUS POST SURGERY: Primary | ICD-10-CM

## 2024-03-06 PROCEDURE — 99999 PR PBB SHADOW E&M-EST. PATIENT-LVL III: CPT | Mod: PBBFAC,,, | Performed by: PHYSICIAN ASSISTANT

## 2024-03-06 PROCEDURE — 99024 POSTOP FOLLOW-UP VISIT: CPT | Mod: S$GLB,,, | Performed by: PHYSICIAN ASSISTANT

## 2024-03-06 PROCEDURE — 1160F RVW MEDS BY RX/DR IN RCRD: CPT | Mod: CPTII,S$GLB,, | Performed by: PHYSICIAN ASSISTANT

## 2024-03-06 PROCEDURE — 1159F MED LIST DOCD IN RCRD: CPT | Mod: CPTII,S$GLB,, | Performed by: PHYSICIAN ASSISTANT

## 2024-03-06 NOTE — PROGRESS NOTES
Gi Abdullahi is a 53 y.o. female who presents to clinic for follow-up status post left medial epicondylitis debridement.  She is approximately 4 weeks status post surgery.  States he has discontinue wearing the sling against instructions, but only for the last 4 days.  States he is worn the removable Velcro splint as instructed.  States he has had limited weight-bearing as instructed.  Denies any acute injuries since last visit.  Denies any paresthesias.  Denies any other complaints at this time.    Physical Exam:  Examination of the left elbow reveals a well-healed surgical site.  Presence of minimal edema.  No erythema, ecchymosis, or skin breakdown noted.  Presence of mildly reduced range of motion regards to extension of the left elbow.  Strength not tested secondary to surgery.  Sensation is grossly intact in the radial, ulnar, median nerve distributions.  Capillary refill less than 2 seconds.    Radiology:  None.    Assessment:  Status post medial epicondylitis debridement of the left elbow    Plan:  1. I discussed with Gi Abdullahi and her  that the best course of action this time is perform a course of occupational/physical therapy to increase the function/range of motion of the left elbow/arm.  We did discuss the importance of continuing to wear the removable Velcro long wrist splint at all times only to remove for bathing.  We did discuss the importance of nonweightbearing of the left arm/hand till seen again in clinic.  She verbally agreed with the treatment plan.      2. She was referred to Occupational therapy in clinic today     3. I would like him to follow up in clinic in 2 weeks for repeat evaluation.  She was instructed to contact the clinic for any problems or concerns in the interim.

## 2024-03-11 ENCOUNTER — CLINICAL SUPPORT (OUTPATIENT)
Dept: REHABILITATION | Facility: HOSPITAL | Age: 54
End: 2024-03-11
Payer: COMMERCIAL

## 2024-03-11 DIAGNOSIS — M25.522 LEFT ELBOW PAIN: Primary | ICD-10-CM

## 2024-03-11 DIAGNOSIS — M25.422 EFFUSION OF LEFT ELBOW: ICD-10-CM

## 2024-03-11 DIAGNOSIS — M25.622 ELBOW STIFFNESS, LEFT: ICD-10-CM

## 2024-03-11 DIAGNOSIS — Z98.890 STATUS POST SURGERY: ICD-10-CM

## 2024-03-11 DIAGNOSIS — R29.898 LEFT ARM WEAKNESS: ICD-10-CM

## 2024-03-11 PROCEDURE — 97110 THERAPEUTIC EXERCISES: CPT | Mod: PN

## 2024-03-11 PROCEDURE — 97165 OT EVAL LOW COMPLEX 30 MIN: CPT | Mod: PN

## 2024-03-11 NOTE — PLAN OF CARE
[facility] Occupational Therapy  Initial Evaluation     Date: 3/11/2024  Name: Gi Abdullahi  Clinic Number: [id number]    Therapy Diagnosis:   Encounter Diagnoses   Name Primary?    Status post surgery     Left elbow pain Yes    Effusion of left elbow     Elbow stiffness, left     Left arm weakness      Physician: Nate Estrella PA-C    Physician Orders: evaluate and tx, see epic    DATE OF SURGERY: 2/8/2024      PRE-OPERATIVE DIAGNOSIS:  Left elbow medial epicondylitis     POST-OPERATIVE DIAGNOSIS:  Left elbow medial epicondylitis      ANESTHESIA TYPE:  General     BLOOD LOSS:  Less than 10 cc     TOURNIQUET TIME:  21 minutes     SURGEON: Dr Galdamez     ASSISTANT:  Lucia Lincoln     PROCEDURE:  Left elbow medial epicondylitis debridement              See surgical report for details if applicable, read about use of anchor      Evaluation Date: 3/11/2024  Insurance Authorization Period Expiration: referral [id number] 3-6-24 thru 12-31-24              Plan of Care Certification Period: 3-11-24 thru 6-3-24                         Date of Return to referral source's office: see epic    Visit # / Visits authorized: 1 / 1 referral [id number] 3-6-24 thru 12-31-24  Time In:7  Time Out: 730  Total Billable Time: 15 minutes    Precautions:  universal, see epic, protocol if applicable  Subjective     Involved Side: left  Dominant Side: left  Date of Onset: about 2 years ago  History of Current Condition/Mechanism of Injury: gradual onset of pain, failed nonoperative tx, had surgery, OT ordered recently  Imaging: see epic  Previous Therapy: none for above surgery    Past Medical History/Physical Systems Review:   Gi Abdullahi  has a past medical history of Arthritis, Cervical cancer, Depression, Insomnia, and Tinnitus.    Gi Abdullahi  has a past surgical history that includes Cervix removal; Laparoscopic cholecystectomy (N/A, 9/3/2018); Hemorrhoid surgery (2018); Breast biopsy (Right, 11/8/2019);  Hysterectomy (1995); Cyst Removal (Right, 12/2/2020); and tenotomy, elbow, with debridement (Left, 2/8/2024).    Gi has a current medication list which includes the following prescription(s): fluoxetine and hydroxyzine pamoate.    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     Current Functional Status   Home/Living environment : lives with spouse    Limitation of Functional Status as follows: decreased motion, use, and strength with ADL   ADLs/IADLs:               See above   Leisure: not tested  pain meds: denies  nicotine: 1 pack cigarettes daily  caffeine: 2 cups regular coffee daily    Objective   Posture:healed incision per above surgery, mild edema diffusely around elbow  Palpation:not tested  Sensation:intermittent diffuse numbness distal to incision around medial forearm  ROM:  Prom                                  right        left  Ext/flex in available sup    0/150    30/150  Sup/pro elbow in 90          90/90    70/70      Edema:circumferential     elbow right 26.0 cm left 26.5 cm  DME:prefabricated wrist splint she brought today        CMS Impairment/Limitation/Restriction for FOTO Survey    Therapist reviewed FOTO scores for Gi Abdullahi on 3/11/2024.   FOTO documents entered into [employer] - see Media section.                 Treatment     Treatment Time In: 715  Treatment Time Out: 730  Total Treatment time separate from Evaluation time:15      Gi received therapeutic exercises for  15 minutes including:  -see above and/or media section                Home Exercise Program/Education:  Issued HEP (see patient instructions in EMR in media or note) . Exercises were reviewed and Gi was able to demonstrate them prior to the end of the session.   Pt received a written copy of exercises to perform at home. Gi demonstrated good understanding of the education provided.  Pt was advised to perform these exercises free of pain, and to stop performing them if pain occurs.    Patient/Family Education: role of OT, goals for OT, scheduling/cancellations - pt verbalized understanding. Discussed insurance limitations with patient.    Additional Education provided: likely tx progression, expectations of rehab    Assessment     Gi Abdullahi is a 53 y.o. female referred to outpatient occupational therapy and presents with a medical diagnosis of see above resulting in Decreased ROM, Decreased muscle strength, Decreased functional hand use, Increased pain, Edema, Joint Stiffness, Scar Adhesions, and Diminished/Impaired Sensation and demonstrates limitations as described in the chart below. Following medical record review it is determined that pt will benefit from occupational therapy services in order to maximize pain free and/or functional use of left distal arm. The following goals were discussed with the patient and patient is in agreement with them as to be addressed in the treatment plan. The patient's rehab potential is good.     Anticipated barriers to occupational therapy: edema, pain, stiffness, scar, weakness; need for education of likely and proper tx progression, estimated tx duration based on extent of symptoms pre OT and current deficits with functional use  Pt has no cultural, educational or language barriers to learning provided.    Profile and History Assessment of Occupational Performance Level of Clinical Decision Making Complexity Score   Occupational Profile:   Gi Abdullahi is a 53 y.o.  female who lives with their spouse and is  employed but on medical leave  Gi Abdullahi has difficulty with  ADLs and IADLs as listed previously, which  affecting his/her daily functional abilities.      Comorbidities:    has a past medical history of Arthritis, Cervical cancer, Depression, Insomnia, and Tinnitus.    Medical and Therapy History Review:   Brief               Performance Deficits    Physical:  Joint Mobility  Muscle Power/Strength  Skin Integrity/Scar Formation  Edema  Pain    Cognitive:  No Deficits    Psychosocial:    No Deficits     Clinical Decision Making:  low    Assessment Process:  Problem-Focused Assessments    Modification/Need for Assistance:  Not Necessary    Intervention Selection:  Limited Treatment Options       low  Based on PMHX, co morbidities , data from assessments and functional level of assistance required with task and clinical presentation directly impacting function.           The following goals were discussed with the patient and patient is in agreement with them as to be addressed in the treatment plan.     Goals:   Short term goals to be met in 4 weeks 1. Patient will be I with HEP 2. Patient will have 2/10 pain with light use 3. Patient will have = prom of bilateral elbow thru hand to enhance affected arm use with ADL      Long term goals to be met by d/c 1. Patient will be I with d/c HEP 2. Patient will have 1/10 pain with light use 3. Patient will have about 75% /pinch on affected side vs unaffected side to promote full functional use                                                                 4. Patient will be I with all light ADL  5. Patient will have 4/5 elbow ext and flex MMT to improve use with ADL    all goals ongoing unless noted above or met today or in past but not noted yet    Plan   Certification Period/Plan of care expiration: 3/11/2024 to             6-3-24-24    Outpatient Occupational Therapy  2 times weekly for 12 weeks to include the following  interventions: eval and tx    Above frequency and duration in above dates may change based on patient progress and need for therapy    Pete ROGERS CHT

## 2024-03-20 ENCOUNTER — OFFICE VISIT (OUTPATIENT)
Dept: ORTHOPEDICS | Facility: CLINIC | Age: 54
End: 2024-03-20
Payer: COMMERCIAL

## 2024-03-20 VITALS — WEIGHT: 220 LBS | BODY MASS INDEX: 34.53 KG/M2 | HEIGHT: 67 IN

## 2024-03-20 DIAGNOSIS — Z98.890 STATUS POST SURGERY: Primary | ICD-10-CM

## 2024-03-20 PROCEDURE — 99999 PR PBB SHADOW E&M-EST. PATIENT-LVL III: CPT | Mod: PBBFAC,,, | Performed by: PHYSICIAN ASSISTANT

## 2024-03-20 PROCEDURE — 1159F MED LIST DOCD IN RCRD: CPT | Mod: CPTII,S$GLB,, | Performed by: PHYSICIAN ASSISTANT

## 2024-03-20 PROCEDURE — 1160F RVW MEDS BY RX/DR IN RCRD: CPT | Mod: CPTII,S$GLB,, | Performed by: PHYSICIAN ASSISTANT

## 2024-03-20 PROCEDURE — 99024 POSTOP FOLLOW-UP VISIT: CPT | Mod: S$GLB,,, | Performed by: PHYSICIAN ASSISTANT

## 2024-03-20 NOTE — PROGRESS NOTES
Gi Abdullahi is a 53 y.o. female who presents to clinic for follow-up status post left medial epicondylitis release/debridement.  She is approximately 6 weeks status post surgery.  States he has only gone to 1 occupational therapy visit, as the occupational therapist was out of town.  States he does have some soreness of the right elbow/arm.  States he has been wearing the removable Velcro splint as instructed.  Denies any acute injuries since last visit.  Denies any paresthesias.  Denies any other complaints at this time.    Physical Exam:  Examination of the left arm reveals a well-healed surgical site.  Presence of minimal edema.  No erythema, ecchymosis, or skin breakdown.  Presence of minimally reduced range of motion regards to extension of the left elbow.  Able make composite fist and fully extend all fingers.  Presence of moderately reduced range of motion regards to flexion/extension of the left wrist secondary immobilization.  Strength not tested secondary to surgery.  Normal sensation in the radial, ulnar, median nerve distributions.  Capillary refill less than 2 seconds.    Radiology:  None.    Assessment:  Status post left medial epicondylitis release/debridement    Plan:  1. I discussed with Gi Abdullahi that she is progressing appropriately in the postoperative course.  We discussed the best course of action this time is to follow up with the occupational therapist 3 times per week for the next few weeks until seen again in clinic.  We did discuss she can transition to wearing the removable Velcro long wrist splint only to remove for bathing, sedentary activities, and occupational therapy.  We did discuss she can return to work under the restriction of significantly limited weight-bearing and having to wear the brace at all times during work.  She verbalized understanding and verbally agreed with the treatment plan.    2. I would like her follow up in clinic in 3 weeks for repeat evaluation.  She  was instructed to contact the clinic for any problems or concerns in the interim.

## 2024-03-20 NOTE — LETTER
March 20, 2024      [facility]  [address]  [place] [address]  Phone: [phone]       Patient: Gi Abdullahi   YOB: 1970  Date of Visit: 03/20/2024    To Whom It May Concern:    Brody Abdullahi  was at [facility] on 03/20/2024. The patient may return to work on 03/25/2024 with restrictions. She may weight bear 5lbs or less of her left arm/hand. She must wear her splint while working. If you have any questions or concerns, or if I can be of further assistance, please do not hesitate to contact me.    Sincerely,    GEREMIAS Shukla.

## 2024-03-21 ENCOUNTER — PATIENT MESSAGE (OUTPATIENT)
Dept: ORTHOPEDICS | Facility: CLINIC | Age: 54
End: 2024-03-21
Payer: COMMERCIAL

## 2024-03-21 ENCOUNTER — CLINICAL SUPPORT (OUTPATIENT)
Dept: REHABILITATION | Facility: HOSPITAL | Age: 54
End: 2024-03-21
Payer: COMMERCIAL

## 2024-03-21 DIAGNOSIS — M25.632 STIFFNESS OF LEFT WRIST JOINT: ICD-10-CM

## 2024-03-21 DIAGNOSIS — M25.422 EFFUSION OF LEFT ELBOW: ICD-10-CM

## 2024-03-21 DIAGNOSIS — M25.522 LEFT ELBOW PAIN: Primary | ICD-10-CM

## 2024-03-21 DIAGNOSIS — R29.898 LEFT ARM WEAKNESS: ICD-10-CM

## 2024-03-21 DIAGNOSIS — M25.622 ELBOW STIFFNESS, LEFT: ICD-10-CM

## 2024-03-21 PROCEDURE — 97022 WHIRLPOOL THERAPY: CPT | Mod: PN

## 2024-03-21 PROCEDURE — 97110 THERAPEUTIC EXERCISES: CPT | Mod: PN

## 2024-03-21 NOTE — PATIENT INSTRUCTIONS
home program 3-21-24   -do below stretches one time, at least 2 x day, at least a 10 minute hold, mild discomfort only  *use right hand to push left elbow straight (left palm up as comfortable) with left elbow supported on rolled up towel  *use right hand to push left elbow bent (left palm up as comfortable) with left elbow supported on rolled up towel  *with left elbow by side in L shape, use right hand to turn left forearm up  *use right hand to push left wrist straight back  *use right hand to push left wrist straight forward

## 2024-03-21 NOTE — PROGRESS NOTES
Occupational Therapy Daily Treatment Note     Date: 3/21/2024  Name: Gi Abdullahi  Clinic Number: [id number]    Therapy Diagnosis:   Encounter Diagnoses   Name Primary?    Left elbow pain Yes    Effusion of left elbow     Elbow stiffness, left     Left arm weakness      Physician: Nate Estrella PA-C      Physician Orders: evaluate and tx, see epic     DATE OF SURGERY: 2/8/2024      PRE-OPERATIVE DIAGNOSIS:  Left elbow medial epicondylitis     POST-OPERATIVE DIAGNOSIS:  Left elbow medial epicondylitis      ANESTHESIA TYPE:  General     BLOOD LOSS:  Less than 10 cc     TOURNIQUET TIME:  21 minutes     SURGEON: Dr Galdamez     ASSISTANT:  Lucia Lincoln     PROCEDURE:  Left elbow medial epicondylitis debridement                    See surgical report for details if applicable, read about use of anchor        Evaluation Date: 3/11/2024  Insurance Authorization Period Expiration: referral [id number] 3-21-24 thru 12-31-24         Plan of Care Certification Period: 3-11-24 thru 6-3-24                         Date of Return to referral source's office: see epic     Visit # / Visits authorized: 1 / 12 referral [id number] 3-21-24 thru 12-31-24    Time In:7  Time Out: 8  Total Billable Time: 60 minutes    Precautions:  universal, see epic, protocol if applicable    Subjective     Pt reports: says 2 x week is more practical than 3 x week since she is trying to get back to work (I mentioned last ortho note wants 3 x week plus I highlighted home stretching is the key to fix stiffness)  she is compliant with home exercise program.  Response to previous treatment: good  Functional change: none stated    Pain: 0/10 rest; up to 6/10 light use  Side:left  Location: medial elbow       diffuse ache  Objective       Timed units:  3 therapeutic exercise                            time:7-745      Untimed units:  fluidotherapy                           time:745-8      Measurements/special tests/observations: n/a        Fluidotherapy: 15  "min.    Ultrasound:n/a      Upper body ergometer: n/a    Scar massage: n/a    Stretches as tolerated-pain free: elbow ext in full available sup, elbow ext in full available sup, sup elbow by side in "L" shape, df, pf      Manual: n/a    Range of motion: n/a    Progressive resistive exercise: n/a      Home exercise program: see above and/or below    Home Exercises and Education Provided     Education provided:     Explained basic concept of creep, tissue reorientation, collagen reorganization, etc. associated with low load prolonged stretching as well as counterproductive consequences of painful and forceful stretching (increased edema, tissue trauma, ligamentous shortening, etc.)          progress towards goals   likely treatment progression  rationale of rehab interventions    Written Home Exercises/Information Provided: yes.        previously issued exercises and/or other issued home therapy instructions were reviewed if still part of current treatment plan as well as any issued today and Gi was able to demonstrate them prior to the end of the session.  Gi demonstrated good understanding of the HEP provided.     See EMR under patient instructions and/or media for HEP issued today or in past    Assessment             Pt would continue to benefit from skilled occupational therapy in order to facilitate strong, painless, and full use.    Gi is progressing well towards her goals and there are no updates to goals at this time unless goals noted below are different than goals on previous notes or evaluation. Pt prognosis is good  Pt will continue to benefit from skilled outpatient occupational therapy to address the deficits listed in the problem list on initial evaluation to provide pt/family education and to maximize pt's level of independence in the home and community environment.     Anticipated barriers to occupational therapy: edema, pain, stiffness, scar, weakness; need for education of likely and proper " tx progression, estimated tx duration based on extent of symptoms pre OT and current deficits with functional use    Pt's spiritual, cultural and educational needs considered and pt agreeable to plan of care and goals.    Goals:  Short term goals to be met in 4 weeks 1. Patient will be I with HEP 2. Patient will have 2/10 pain with light use 3. Patient will have = prom of bilateral elbow thru hand to enhance affected arm use with ADL        Long term goals to be met by d/c 1. Patient will be I with d/c HEP 2. Patient will have 1/10 pain with light use 3. Patient will have about 75% /pinch on affected side vs unaffected side to promote full functional use                                                                 4. Patient will be I with all light ADL  5. Patient will have 4/5 elbow ext and flex MMT to improve use with ADL     all goals ongoing unless noted above or met today or in past but not noted yet            Any goals met today ?  (if any met see above)    Updates/Grading for next session: as needed    Plan: evaluate and treat    Pete ROGERS CHT

## 2024-04-02 ENCOUNTER — CLINICAL SUPPORT (OUTPATIENT)
Dept: REHABILITATION | Facility: HOSPITAL | Age: 54
End: 2024-04-02
Payer: COMMERCIAL

## 2024-04-02 DIAGNOSIS — M25.422 EFFUSION OF LEFT ELBOW: ICD-10-CM

## 2024-04-02 DIAGNOSIS — R29.898 LEFT ARM WEAKNESS: ICD-10-CM

## 2024-04-02 DIAGNOSIS — M25.522 LEFT ELBOW PAIN: Primary | ICD-10-CM

## 2024-04-02 DIAGNOSIS — M25.622 ELBOW STIFFNESS, LEFT: ICD-10-CM

## 2024-04-02 DIAGNOSIS — M25.632 STIFFNESS OF LEFT WRIST JOINT: ICD-10-CM

## 2024-04-02 PROCEDURE — 97110 THERAPEUTIC EXERCISES: CPT | Mod: PN

## 2024-04-02 PROCEDURE — 97022 WHIRLPOOL THERAPY: CPT | Mod: PN

## 2024-04-02 NOTE — PROGRESS NOTES
Occupational Therapy Daily Treatment Note     Date: 4/2/2024  Name: Gi Abdullahi  Clinic Number: [id number]    Therapy Diagnosis:   Encounter Diagnoses   Name Primary?    Left elbow pain Yes    Effusion of left elbow     Elbow stiffness, left     Left arm weakness     Stiffness of left wrist joint      Physician: Nate Estrella, OG      Physician Orders: evaluate and tx, see epic     DATE OF SURGERY: 2/8/2024      PRE-OPERATIVE DIAGNOSIS:  Left elbow medial epicondylitis     POST-OPERATIVE DIAGNOSIS:  Left elbow medial epicondylitis      ANESTHESIA TYPE:  General     BLOOD LOSS:  Less than 10 cc     TOURNIQUET TIME:  21 minutes     SURGEON: Dr Galdamez     ASSISTANT:  Lucia Lincoln     PROCEDURE:  Left elbow medial epicondylitis debridement                    See surgical report for details if applicable, read about use of anchor        Evaluation Date: 3/11/2024  Insurance Authorization Period Expiration: referral [id number] 3-21-24 thru 12-31-24         Plan of Care Certification Period: 3-11-24 thru 6-3-24                         Date of Return to referral source's office: see epic     Visit # / Visits authorized: 2 / 12 referral [id number] 3-21-24 thru 12-31-24    Time In: 3  Time Out:4  Total Billable Time: 60 minutes    Precautions:  universal, see epic, protocol if applicable    Subjective     Pt reports:   she is compliant with home exercise program.  Response to previous treatment: good  Functional change: light use slowly increasing  Pain: 0/10 rest; 2/10 light use  Side:left  Location: medial elbow       diffuse ache  Objective       Timed units:  3 therapeutic exercise                            time:315-4      Untimed units:  fluidotherapy                                        time:3-315      Measurements/special tests/observations:                                                       right              left   Prom elbow ext/flex                   0/150            10/150        Fluidotherapy: 15  "min.    Ultrasound:n/a      Upper body ergometer: n/a    Scar massage: n/a    Stretches as tolerated-pain free:  elbow ext in full available sup, sup with elbow by side in "L" shape, df, pf, prayer, fist forward      Manual: n/a    Range of motion: n/a    Progressive resistive exercise: n/a      Home exercise program: see above and/or below      Home Exercises and Education Provided     Education provided:     Prayer and fist forward stretch rationale    Do elbow flex stretches 1 x day for 5 min.    progress towards goals   likely treatment progression  rationale of rehab interventions    Written Home Exercises/Information Provided: no      previously issued exercises and/or other issued home therapy instructions were reviewed if still part of current treatment plan as well as any issued today and Gi was able to demonstrate them prior to the end of the session.  Gi demonstrated good understanding of the HEP provided.     See EMR under patient instructions and/or media for HEP issued today or in past    Assessment       Looks faithful with home program      Pt would continue to benefit from skilled occupational therapy in order to facilitate strong, painless, and full use.    Gi is progressing well towards her goals and there are no updates to goals at this time unless goals noted below are different than goals on previous notes or evaluation. Pt prognosis is good  Pt will continue to benefit from skilled outpatient occupational therapy to address the deficits listed in the problem list on initial evaluation to provide pt/family education and to maximize pt's level of independence in the home and community environment.     Anticipated barriers to occupational therapy: edema, pain, stiffness, scar, weakness; need for education of likely and proper tx progression, estimated tx duration based on extent of symptoms pre OT and current deficits with functional use    Pt's spiritual, cultural and educational needs " considered and pt agreeable to plan of care and goals.    Goals:  Short term goals to be met in 4 weeks 1. Patient will be I with HEP 2. Patient will have 2/10 pain with light use-met 4-2-24 3. Patient will have = prom of bilateral elbow thru hand to enhance affected arm use with ADL        Long term goals to be met by d/c 1. Patient will be I with d/c HEP 2. Patient will have 1/10 pain with light use 3. Patient will have about 75% /pinch on affected side vs unaffected side to promote full functional use                                                                 4. Patient will be I with all light ADL  5. Patient will have 4/5 elbow ext and flex MMT to improve use with ADL     all goals ongoing unless noted above or met today or in past but not noted yet            Any goals met today ?  (if any met see above)    Updates/Grading for next session: as needed    Plan: evaluate and treat    Pete ROGERS CHT

## 2024-04-09 ENCOUNTER — CLINICAL SUPPORT (OUTPATIENT)
Dept: REHABILITATION | Facility: HOSPITAL | Age: 54
End: 2024-04-09
Payer: COMMERCIAL

## 2024-04-09 DIAGNOSIS — R29.898 LEFT ARM WEAKNESS: ICD-10-CM

## 2024-04-09 DIAGNOSIS — M25.622 ELBOW STIFFNESS, LEFT: ICD-10-CM

## 2024-04-09 DIAGNOSIS — M25.632 STIFFNESS OF LEFT WRIST JOINT: ICD-10-CM

## 2024-04-09 DIAGNOSIS — M25.522 LEFT ELBOW PAIN: Primary | ICD-10-CM

## 2024-04-09 PROCEDURE — 97110 THERAPEUTIC EXERCISES: CPT | Mod: PN

## 2024-04-09 PROCEDURE — 97022 WHIRLPOOL THERAPY: CPT | Mod: PN

## 2024-04-09 NOTE — PROGRESS NOTES
Occupational Therapy Daily Treatment Note     Date: 4/9/2024  Name: Gi Abdullahi  Clinic Number: [id number]    Therapy Diagnosis:   Encounter Diagnoses   Name Primary?    Left elbow pain Yes    Elbow stiffness, left     Left arm weakness     Stiffness of left wrist joint      Physician: Nate Estrella PA-C      Physician Orders: evaluate and tx, see epic     DATE OF SURGERY: 2/8/2024      PRE-OPERATIVE DIAGNOSIS:  Left elbow medial epicondylitis     POST-OPERATIVE DIAGNOSIS:  Left elbow medial epicondylitis      ANESTHESIA TYPE:  General     BLOOD LOSS:  Less than 10 cc     TOURNIQUET TIME:  21 minutes     SURGEON: Dr Galdamez     ASSISTANT:  Lucia Lincoln     PROCEDURE:  Left elbow medial epicondylitis debridement                    See surgical report for details if applicable, read about use of anchor        Evaluation Date: 3/11/2024  Insurance Authorization Period Expiration: referral [id number] 3-21-24 thru 12-31-24         Plan of Care Certification Period: 3-11-24 thru 6-3-24                         Date of Return to referral source's office: see epic     Visit # / Visits authorized: 3 / 12 referral [id number] 3-21-24 thru 12-31-24    Time In: 3  Time Out:4  Total Billable Time: 60 minutes    Precautions:  universal, see epic, protocol if applicable    Subjective     Pt reports: sees ortho tomorrow  she is compliant with home exercise program.  Response to previous treatment: good  Functional change: light use slowly increasing day to day  Pain: 0/10 rest; 2/10 light use  Side:left  Location: medial elbow       diffuse ache  Objective       Timed units:  3 therapeutic exercise                            time:315-4      Untimed units:  fluidotherapy                                        time:3-315      Measurements/special tests/observations:                                                         Passive range of motion                                                                         right                              left  Supination/pronation with elbow at 90                                                   90/90                           80/90  Dorsiflexion/palmar flexion                                                                    80/90                            70/70  Radial deviation/ulnar deviation                                                             20/40                            20/40    Fluidotherapy: 15 min.    Ultrasound:n/a      Upper body ergometer: Level 1 x 10 min. (5 min. forward, 5 min. backward)    Scar massage: n/a    Stretches as tolerated-pain free:   df, pf      Manual: n/a    Range of motion: n/a    Progressive resistive exercise:     Isometrics 2 x 20:     wrist df with elbow by side at 90 and forearm at neutral  wrist pf with elbow by side at 90 and forearm at neutral      Home exercise program: see above and/or below      Home Exercises and Education Provided     Education provided:     General improvement of wrist motion since day 1 of OT      progress towards goals   likely treatment progression  rationale of rehab interventions    Written Home Exercises/Information Provided: no      previously issued exercises and/or other issued home therapy instructions were reviewed if still part of current treatment plan as well as any issued today and Gi was able to demonstrate them prior to the end of the session.  Gi demonstrated good understanding of the HEP provided.     See EMR under patient instructions and/or media for HEP issued today or in past    Assessment       Elbow thru wrist tightness almost resolved, strengthening likely will help to maximize functional use and return to PLOF      Pt would continue to benefit from skilled occupational therapy in order to facilitate strong, painless, and full use.    Gi is progressing well towards her goals and there are no updates to goals at this time unless goals noted below are different than goals on previous notes  or evaluation. Pt prognosis is good  Pt will continue to benefit from skilled outpatient occupational therapy to address the deficits listed in the problem list on initial evaluation to provide pt/family education and to maximize pt's level of independence in the home and community environment.     Anticipated barriers to occupational therapy: edema, pain, stiffness, scar, weakness; need for education of likely and proper tx progression, estimated tx duration based on extent of symptoms pre OT and current deficits with functional use    Pt's spiritual, cultural and educational needs considered and pt agreeable to plan of care and goals.    Goals:  Short term goals to be met in 4 weeks 1. Patient will be I with HEP 2. Patient will have 2/10 pain with light use-met 4-2-24 3. Patient will have = prom of bilateral elbow thru hand to enhance affected arm use with ADL        Long term goals to be met by d/c 1. Patient will be I with d/c HEP 2. Patient will have 1/10 pain with light use 3. Patient will have about 75% /pinch on affected side vs unaffected side to promote full functional use                                                                 4. Patient will be I with all light ADL  5. Patient will have 4/5 elbow ext and flex MMT to improve use with ADL     all goals ongoing unless noted above or met today or in past but not noted yet            Any goals met today ?  (if any met see above)    Updates/Grading for next session: as needed    Plan: evaluate and treat    Pete ROGERS CHT

## 2024-04-10 ENCOUNTER — PATIENT MESSAGE (OUTPATIENT)
Dept: ORTHOPEDICS | Facility: CLINIC | Age: 54
End: 2024-04-10
Payer: COMMERCIAL

## 2024-04-12 ENCOUNTER — OFFICE VISIT (OUTPATIENT)
Dept: ORTHOPEDICS | Facility: CLINIC | Age: 54
End: 2024-04-12
Payer: COMMERCIAL

## 2024-04-12 DIAGNOSIS — Z98.890 STATUS POST SURGERY: Primary | ICD-10-CM

## 2024-04-12 PROCEDURE — 99999 PR PBB SHADOW E&M-EST. PATIENT-LVL II: CPT | Mod: PBBFAC,,, | Performed by: PHYSICIAN ASSISTANT

## 2024-04-12 PROCEDURE — 1160F RVW MEDS BY RX/DR IN RCRD: CPT | Mod: CPTII,S$GLB,, | Performed by: PHYSICIAN ASSISTANT

## 2024-04-12 PROCEDURE — 1159F MED LIST DOCD IN RCRD: CPT | Mod: CPTII,S$GLB,, | Performed by: PHYSICIAN ASSISTANT

## 2024-04-12 PROCEDURE — 99024 POSTOP FOLLOW-UP VISIT: CPT | Mod: S$GLB,,, | Performed by: PHYSICIAN ASSISTANT

## 2024-04-12 NOTE — PROGRESS NOTES
Caller: Lucie Michele    Relationship: Self    Best call back number: [phone]    Requested Prescriptions:   Requested Prescriptions     Pending Prescriptions Disp Refills   • meloxicam (MOBIC) 7.5 MG tablet 30 tablet 0     Sig: Take 1 tablet by mouth Daily.        Pharmacy where request should be sent: [id number] - [place], [place] - [address] AT [address] [facility] [address]  [place] - [phone] [facility] [phone] FX     Does the patient have less than a 3 day supply:  [] Yes  [x] No    Would you like a call back once the refill request has been completed: [] Yes [x] No    If the office needs to give you a call back, can they leave a voicemail: [] Yes [x] No    Manjeet Mendoza Rep   12/06/22 14:15 EST      Gi Abdullahi is a 53 y.o. female who presents to clinic for follow-up status post left medial epicondyle debridement.  She is approximately 9 weeks status post surgery.  States overall she is doing very well.  States she continues to have some hypersensitivity around the surgical site, but is otherwise having no pain.  Denies any acute injuries since her last visit.  Denies any paresthesias.  Denies any other complaints at this time.    Physical Exam:  Examination of the left elbow reveals a well-healed surgical site.  Presence of hypersensitivity around the surgical site.  Presence of minimal edema around the surgical site.  No erythema, ecchymosis, or skin breakdown.  Full intact range of motion left elbow.  Full intact range of motion left wrist.  Able make composite fist and fully extend all fingers.  5/5 /intrinsic strength.  5/5 wrist flexion/extension strength.  Normal sensation in the radial, ulnar, median nerve distributions.  Capillary refill less than 2 seconds.    Radiology:  None.    Assessment:  Status post left medial epicondyle debridement    Plan:  1. I discussed with Gi Abdullahi that she is progressing appropriately in the postoperative course.  We discussed the best course of action at this time is to continue with occupational therapy with a focus on desensitization.  We did discuss otherwise she may gradually return to normal activity as tolerated.  She verbally agreed with the treatment plan.    2. I would like her follow up in clinic on a p.r.n. basis for any worsening of her symptoms or for any hand, wrist, elbow problems/concerns.  She was instructed to contact clinic for any problems or concerns in the interim.

## 2024-04-23 ENCOUNTER — DOCUMENTATION ONLY (OUTPATIENT)
Dept: REHABILITATION | Facility: HOSPITAL | Age: 54
End: 2024-04-23
Payer: COMMERCIAL

## 2024-04-23 NOTE — PROGRESS NOTES
Sent teams message about 1 minute ago to  to call and schedule patient for 1 visit next week    Patient no showed today's 3:00 visit    No visits currently set up past today

## 2024-05-02 ENCOUNTER — DOCUMENTATION ONLY (OUTPATIENT)
Dept: REHABILITATION | Facility: HOSPITAL | Age: 54
End: 2024-05-02
Payer: COMMERCIAL

## 2024-05-02 NOTE — PROGRESS NOTES
Outpatient Therapy Discharge Summary     Date: 5-2-24      Name: Gi Abdullahi  [facility] Number: [id number]    Therapy Diagnosis: No diagnosis found.  Physician: No ref. provider found    Physician Orders: see evaluation  Medical Diagnosis: see evaluation  Evaluation Date: 3-11-24      Date of Last visit: 4-9-24  Total Visits Received: 4  Cancelled Visits: see epic  No Show Visits: see epic    Assessment    Goals: see last note    Discharge reason: Patient has not attended therapy since 4-9-24    Plan   This patient is discharged from Occupational Therapy

## 2024-07-09 ENCOUNTER — PATIENT MESSAGE (OUTPATIENT)
Dept: ADMINISTRATIVE | Facility: HOSPITAL | Age: 54
End: 2024-07-09
Payer: COMMERCIAL

## 2024-08-01 ENCOUNTER — OFFICE VISIT (OUTPATIENT)
Dept: FAMILY MEDICINE | Facility: CLINIC | Age: 54
End: 2024-08-01
Payer: COMMERCIAL

## 2024-08-01 VITALS
RESPIRATION RATE: 20 BRPM | TEMPERATURE: 98 F | BODY MASS INDEX: 34.72 KG/M2 | HEIGHT: 67 IN | SYSTOLIC BLOOD PRESSURE: 138 MMHG | DIASTOLIC BLOOD PRESSURE: 80 MMHG | WEIGHT: 221.19 LBS | HEART RATE: 84 BPM

## 2024-08-01 DIAGNOSIS — F17.200 TOBACCO DEPENDENCE: ICD-10-CM

## 2024-08-01 DIAGNOSIS — F41.9 ANXIETY: Primary | ICD-10-CM

## 2024-08-01 DIAGNOSIS — F33.0 MILD EPISODE OF RECURRENT MAJOR DEPRESSIVE DISORDER: ICD-10-CM

## 2024-08-01 DIAGNOSIS — Z00.00 ROUTINE HEALTH MAINTENANCE: ICD-10-CM

## 2024-08-01 DIAGNOSIS — F17.200 NEEDS SMOKING CESSATION EDUCATION: ICD-10-CM

## 2024-08-01 PROCEDURE — 99999 PR PBB SHADOW E&M-EST. PATIENT-LVL IV: CPT | Mod: PBBFAC,,, | Performed by: NURSE PRACTITIONER

## 2024-08-01 RX ORDER — ALPRAZOLAM 0.25 MG/1
0.25 TABLET ORAL 2 TIMES DAILY PRN
Qty: 60 TABLET | Refills: 1 | Status: SHIPPED | OUTPATIENT
Start: 2024-08-01 | End: 2024-09-30

## 2024-08-01 RX ORDER — FLUOXETINE HYDROCHLORIDE 20 MG/1
20 CAPSULE ORAL 2 TIMES DAILY
Qty: 60 CAPSULE | Refills: 2 | Status: SHIPPED | OUTPATIENT
Start: 2024-08-01

## 2024-08-01 NOTE — PROGRESS NOTES
Patient ID: Gi Abdullahi is a 54 y.o. female.    Chief Complaint: Follow-up and Anxiety (55 yo female here for med refill. Pt also stating concern of anxiety attacks daily times 3 months. KM/)    Ms Abdullahi is a very pleasant 55 yo who presents today for follow up as she has been dealing with several difficult situations in her home life. She states she has been taking all of her medication as prescribed but she has had more anxiety due to her son abusing substances. She became very tearful in the visit as she states she has been unable to sleep or do anything while dealing with this. She also lost a very close family member in the recent weeks. She is requesting medication to help with anxiety. She has tried buspirone and hydroxyzine in the recent months with minimal relief. I explained if she were to require controlled medication for extended period of time she would need to be seen by psychiatry. She denies thoughts of self harm or suicidal ideation. She denies any other issues or complaints. She is due to have updated blood work at this time. We reviewed all overdue health maintenance.     Anxiety  Presents for initial visit. Onset was 1 to 4 weeks ago. The problem has been rapidly worsening. Symptoms include chest pain, depressed mood, excessive worry, irritability, malaise, muscle tension and nervous/anxious behavior. Symptoms occur most days. The severity of symptoms is moderate. The patient sleeps 6 hours per night. The quality of sleep is fair. Nighttime awakenings: occasional.     There are no known risk factors. Her past medical history is significant for anxiety/panic attacks. Past treatments include benzodiazephines, counseling (CBT), SSRIs, relaxation techniques, lifestyle changes and other medications. The treatment provided mild relief. Compliance with prior treatments has been variable.       Past Medical History:   Diagnosis Date    Arthritis     Cervical cancer 1995    Depression     Insomnia   "   Tinnitus 2020     Past Surgical History:   Procedure Laterality Date    BREAST BIOPSY Right 11/8/2019    Procedure: BIOPSY, BREAST;  Surgeon: Roni Arias MD;  Location: [facility] OR;  Service: General;  Laterality: Right;    CERVIX REMOVAL      CYST REMOVAL Right 12/2/2020    Procedure: EXCISION, CYST;  Surgeon: Adis Mccann MD;  Location: [facility] OR;  Service: Orthopedics;  Laterality: Right;    HEMORRHOID SURGERY  2018    Dr Quintero    HYSTERECTOMY  1995    LAPAROSCOPIC CHOLECYSTECTOMY N/A 9/3/2018    Procedure: CHOLECYSTECTOMY, LAPAROSCOPIC;  Surgeon: Wilmer Degroot MD;  Location: [facility] OR;  Service: General;  Laterality: N/A;    TENOTOMY, ELBOW, WITH DEBRIDEMENT Left 2/8/2024    Procedure: Left medial epicondyle debridement;  Surgeon: Robert Galdamez MD;  Location: [facility] OR;  Service: Orthopedics;  Laterality: Left;  Need FA per physician         Tobacco History:  reports that she has been smoking cigarettes. She started smoking about 37 years ago. She has a 56.5 pack-year smoking history. She has been exposed to tobacco smoke. She has never used smokeless tobacco.      Review of patient's allergies indicates:  No Known Allergies    Current Outpatient Medications:     ALPRAZolam (XANAX) 0.25 MG tablet, Take 1 tablet (0.25 mg total) by mouth 2 (two) times daily as needed for Anxiety., Disp: 60 tablet, Rfl: 1    FLUoxetine 20 MG capsule, Take 1 capsule (20 mg total) by mouth 2 (two) times daily., Disp: 60 capsule, Rfl: 2    Review of Systems   Constitutional:  Positive for activity change, fatigue and irritability.   Cardiovascular:  Positive for chest pain.   Psychiatric/Behavioral:  Positive for dysphoric mood. The patient is nervous/anxious.           Objective:      Vitals:    08/01/24 1441   BP: 138/80   Pulse: 84   Resp: 20   Temp: 98.3 °F (36.8 °C)   TempSrc: Oral   Weight: 100.3 kg (221 lb 3.2 oz)   Height: 5' 7" (1.702 m)     Physical Exam  Constitutional:       Appearance: Normal appearance. " She is obese.   Cardiovascular:      Rate and Rhythm: Normal rate and regular rhythm.      Heart sounds: Normal heart sounds.   Pulmonary:      Effort: Pulmonary effort is normal.      Breath sounds: Normal breath sounds.   Abdominal:      General: Bowel sounds are normal.      Palpations: Abdomen is soft.   Musculoskeletal:         General: Normal range of motion.   Skin:     General: Skin is warm.   Neurological:      Mental Status: She is alert and oriented to person, place, and time. Mental status is at baseline.           Assessment:       1. Anxiety    2. Mild episode of recurrent major depressive disorder    3. Tobacco dependence    4. Routine health maintenance           Plan:       Anxiety  -     ALPRAZolam (XANAX) 0.25 MG tablet; Take 1 tablet (0.25 mg total) by mouth 2 (two) times daily as needed for Anxiety.  Dispense: 60 tablet; Refill: 1    Mild episode of recurrent major depressive disorder  -     FLUoxetine 20 MG capsule; Take 1 capsule (20 mg total) by mouth 2 (two) times daily.  Dispense: 60 capsule; Refill: 2    Tobacco dependence  -     CT Chest Lung Screening Low Dose; Future; Expected date: 08/01/2024    Routine health maintenance  -     HEMOGLOBIN A1C; Future; Expected date: 08/01/2024  -     LIPID PANEL; Future; Expected date: 08/01/2024  -     COMPREHENSIVE METABOLIC PANEL; Future; Expected date: 08/01/2024  -     CBC W/ AUTO DIFFERENTIAL; Future; Expected date: 08/01/2024      Follow up in about 3 months (around 11/1/2024), or if symptoms worsen or fail to improve, for Anxiety .      Spent yoni 30 minutes with patient which involved review of pts medical conditions, labs, medications and with 50% of time face-to-face discussion about medical problems, management and any applicable changes.    8/1/2024 Jeanie Orozco NP

## 2024-08-08 ENCOUNTER — HOSPITAL ENCOUNTER (OUTPATIENT)
Dept: RADIOLOGY | Facility: HOSPITAL | Age: 54
Discharge: HOME | End: 2024-08-08
Attending: NURSE PRACTITIONER
Payer: COMMERCIAL

## 2024-08-08 DIAGNOSIS — F17.200 TOBACCO DEPENDENCE: ICD-10-CM

## 2024-08-08 PROCEDURE — 71271 CT THORAX LUNG CANCER SCR C-: CPT | Mod: 26,,, | Performed by: RADIOLOGY

## 2024-08-08 PROCEDURE — 71271 CT THORAX LUNG CANCER SCR C-: CPT | Mod: TC

## 2024-10-01 DIAGNOSIS — F41.9 ANXIETY: ICD-10-CM

## 2024-10-01 DIAGNOSIS — F33.0 MILD EPISODE OF RECURRENT MAJOR DEPRESSIVE DISORDER: ICD-10-CM

## 2024-10-01 RX ORDER — ALPRAZOLAM 0.25 MG/1
0.25 TABLET ORAL 2 TIMES DAILY
Qty: 60 TABLET | Refills: 1 | Status: SHIPPED | OUTPATIENT
Start: 2024-10-01

## 2024-10-01 RX ORDER — FLUOXETINE HYDROCHLORIDE 20 MG/1
20 CAPSULE ORAL 2 TIMES DAILY
Qty: 60 CAPSULE | Refills: 2 | Status: SHIPPED | OUTPATIENT
Start: 2024-10-01

## 2024-10-21 DIAGNOSIS — F33.0 MILD EPISODE OF RECURRENT MAJOR DEPRESSIVE DISORDER: ICD-10-CM

## 2024-10-21 RX ORDER — FLUOXETINE HYDROCHLORIDE 20 MG/1
20 CAPSULE ORAL 2 TIMES DAILY
Qty: 60 CAPSULE | Refills: 2 | Status: SHIPPED | OUTPATIENT
Start: 2024-10-21

## 2024-11-05 ENCOUNTER — OFFICE VISIT (OUTPATIENT)
Dept: FAMILY MEDICINE | Facility: CLINIC | Age: 54
End: 2024-11-05
Payer: COMMERCIAL

## 2024-11-05 VITALS
RESPIRATION RATE: 20 BRPM | HEIGHT: 67 IN | BODY MASS INDEX: 35.34 KG/M2 | HEART RATE: 64 BPM | DIASTOLIC BLOOD PRESSURE: 74 MMHG | TEMPERATURE: 98 F | WEIGHT: 225.19 LBS | SYSTOLIC BLOOD PRESSURE: 120 MMHG

## 2024-11-05 DIAGNOSIS — E78.2 MIXED HYPERLIPIDEMIA: ICD-10-CM

## 2024-11-05 DIAGNOSIS — Z00.00 ROUTINE HEALTH MAINTENANCE: ICD-10-CM

## 2024-11-05 DIAGNOSIS — F41.9 ANXIETY: Primary | ICD-10-CM

## 2024-11-05 DIAGNOSIS — Z23 FLU VACCINE NEED: ICD-10-CM

## 2024-11-05 PROCEDURE — 3074F SYST BP LT 130 MM HG: CPT | Mod: CPTII,S$GLB,, | Performed by: NURSE PRACTITIONER

## 2024-11-05 PROCEDURE — 90656 IIV3 VACC NO PRSV 0.5 ML IM: CPT | Mod: S$GLB,,, | Performed by: NURSE PRACTITIONER

## 2024-11-05 PROCEDURE — 1159F MED LIST DOCD IN RCRD: CPT | Mod: CPTII,S$GLB,, | Performed by: NURSE PRACTITIONER

## 2024-11-05 PROCEDURE — 3078F DIAST BP <80 MM HG: CPT | Mod: CPTII,S$GLB,, | Performed by: NURSE PRACTITIONER

## 2024-11-05 PROCEDURE — 3008F BODY MASS INDEX DOCD: CPT | Mod: CPTII,S$GLB,, | Performed by: NURSE PRACTITIONER

## 2024-11-05 PROCEDURE — 99999 PR PBB SHADOW E&M-EST. PATIENT-LVL III: CPT | Mod: PBBFAC,,, | Performed by: NURSE PRACTITIONER

## 2024-11-05 PROCEDURE — 99213 OFFICE O/P EST LOW 20 MIN: CPT | Mod: 25,S$GLB,, | Performed by: NURSE PRACTITIONER

## 2024-11-05 PROCEDURE — 90471 IMMUNIZATION ADMIN: CPT | Mod: S$GLB,,, | Performed by: NURSE PRACTITIONER

## 2024-11-05 RX ORDER — ALPRAZOLAM 0.5 MG/1
0.5 TABLET ORAL 2 TIMES DAILY
Qty: 60 TABLET | Refills: 2 | Status: SHIPPED | OUTPATIENT
Start: 2024-11-05 | End: 2025-02-03

## 2024-11-05 NOTE — PROGRESS NOTES
Patient ID: Gi Abdullahi is a 54 y.o. female.    Chief Complaint: Follow-up (53 yo female here for 3 month recheck. Pt states she did not complete labwk and requesting order for [facility] or [facility]. KM)    History of Present Illness    CHIEF COMPLAINT:  Ms. Abdullahi presents for a three-month anxiety recheck and to discuss leg pain at night.    HPI:  Ms. Abdullahi reports ongoing anxiety issues, currently managed with fluoxetine and Xanax. She takes Xanax intermittently, only when she perceives a need due to stress. Ms. Abdullahi describes significant life stressors, including her mother's severe illness and concerns about her son who recently completed rehabilitation. While her son has made positive progress post-rehabilitation, she continues to worry about him.    Ms. Abdullahi also complains of leg pain occurring at night after returning from work. She describes it as an achy sensation, not severe or resembling a muscle spasm. The discomfort interferes with her ability to relax and occasionally disrupts her sleep. This leg pain is a relatively recent development. Ms. Abdullahi considers the possibility of age-related changes but finds the symptom significant enough to report during this visit.    Ms. Abdullahi reports scheduled appointments for a mammogram and with a gynecologist, indicating proactive health management. She denies any issues with her blood pressure.    MEDICATIONS:  Ms. Abdullahi is on Fluoxetine for anxiety. She is also taking Xanax as needed for anxiety management.    MEDICAL HISTORY:  Ms. Abdullahi has a history of anxiety. Ms. Abdullahi has an upcoming appointment with her gynecologist on Thursday for her last Pap or pelvic exam.    FAMILY HISTORY:  Family history is significant for the patient's son with a history of substance abuse who attended rehabilitation. Her mother is currently severely ill.    TEST RESULTS:  Ms. Abdullahi has not completed her labs.    IMAGING:  Ms. Abdullahi has a mammogram scheduled for the next  day.    SOCIAL HISTORY:  Ms. Abdullahi's son underwent rehabilitation for substance abuse.      ROS:  General: -fever, -chills, -fatigue, -weight gain, -weight loss  Eyes: -vision changes, -redness, -discharge  ENT: -ear pain, -nasal congestion, -sore throat  Cardiovascular: -chest pain, -palpitations, -lower extremity edema  Respiratory: -cough, -shortness of breath  Gastrointestinal: -abdominal pain, -nausea, -vomiting, -diarrhea, -constipation, -blood in stool  Genitourinary: -dysuria, -hematuria, -frequency  Musculoskeletal: -joint pain, -muscle pain  Skin: -rash, -lesion  Neurological: -headache, -dizziness, -numbness, -tingling  Psychiatric: +anxiety, -depression, +sleep difficulty, +emotional lability             Past Medical History:   Diagnosis Date    Arthritis     Cervical cancer 1995    Depression     Insomnia     Tinnitus 2020     Past Surgical History:   Procedure Laterality Date    BREAST BIOPSY Right 11/8/2019    Procedure: BIOPSY, BREAST;  Surgeon: Roni Arias MD;  Location: [facility] OR;  Service: General;  Laterality: Right;    CERVIX REMOVAL      CYST REMOVAL Right 12/2/2020    Procedure: EXCISION, CYST;  Surgeon: Adis Mccann MD;  Location: [facility] OR;  Service: Orthopedics;  Laterality: Right;    HEMORRHOID SURGERY  2018    Dr Quintero    HYSTERECTOMY  1995    LAPAROSCOPIC CHOLECYSTECTOMY N/A 9/3/2018    Procedure: CHOLECYSTECTOMY, LAPAROSCOPIC;  Surgeon: Wilmer Degroot MD;  Location: [facility] OR;  Service: General;  Laterality: N/A;    TENOTOMY, ELBOW, WITH DEBRIDEMENT Left 2/8/2024    Procedure: Left medial epicondyle debridement;  Surgeon: Robert Galdamez MD;  Location: [facility] OR;  Service: Orthopedics;  Laterality: Left;  Need FA per physician         Tobacco History:  reports that she has been smoking cigarettes. She started smoking about 37 years ago. She has a 56.9 pack-year smoking history. She has been exposed to tobacco smoke. She has never used smokeless tobacco.      Review of  "patient's allergies indicates:  No Known Allergies    Current Outpatient Medications:     FLUoxetine 20 MG capsule, TAKE ONE CAPSULE BY MOUTH TWICE DAILY, Disp: 60 capsule, Rfl: 2    ALPRAZolam (XANAX) 0.5 MG tablet, Take 1 tablet (0.5 mg total) by mouth 2 (two) times daily., Disp: 60 tablet, Rfl: 2  No current facility-administered medications for this visit.           Objective:      Vitals:    11/05/24 1410   BP: 120/74   Pulse: 64   Resp: 20   Temp: 97.8 °F (36.6 °C)   TempSrc: Oral   Weight: 102.2 kg (225 lb 3.2 oz)   Height: 5' 7" (1.702 m)     Physical Exam  Constitutional:       Appearance: Normal appearance.   HENT:      Mouth/Throat:      Mouth: Mucous membranes are moist.   Cardiovascular:      Rate and Rhythm: Normal rate and regular rhythm.      Heart sounds: Normal heart sounds.   Pulmonary:      Effort: Pulmonary effort is normal.      Breath sounds: Normal breath sounds.   Musculoskeletal:         General: Normal range of motion.   Skin:     General: Skin is warm.   Neurological:      General: No focal deficit present.      Mental Status: She is alert and oriented to person, place, and time. Mental status is at baseline.   Psychiatric:         Mood and Affect: Mood normal.         Behavior: Behavior normal.         Thought Content: Thought content normal.           Assessment:       1. Anxiety    2. Mixed hyperlipidemia    3. Flu vaccine need    4. Routine health maintenance           Plan:       Assessment & Plan    Assessed anxiety management; current medication regimen not fully effective  Evaluated leg discomfort as possible restless leg syndrome    ANXIETY:  - Increased Xanax from current dose to 0.5 mg for anxiety.  - Continued fluoxetine at current dose for anxiety.  - Follow up in 3 months for anxiety recheck.    RESTLESS LEG SYNDROME:  - Started ropinirole at a low dose, to be taken approximately 1 hour before bedtime for possible restless leg syndrome.    LABS:  - Ordered labs to be " completed at [facility] ([facility] on [address] [facility]         Anxiety  -     ALPRAZolam (XANAX) 0.5 MG tablet; Take 1 tablet (0.5 mg total) by mouth 2 (two) times daily.  Dispense: 60 tablet; Refill: 2    Mixed hyperlipidemia  -     LIPID PANEL; Future; Expected date: 11/05/2024    Flu vaccine need  -     influenza (Flulaval, Fluzone, Fluarix) 45 mcg/0.5 mL IM vaccine (> or = 6 mo) 0.5 mL    Routine health maintenance  -     CBC W/ AUTO DIFFERENTIAL; Future; Expected date: 11/05/2024  -     COMPREHENSIVE METABOLIC PANEL; Future; Expected date: 11/05/2024  -     LIPID PANEL; Future; Expected date: 11/05/2024  -     HEMOGLOBIN A1C; Future; Expected date: 11/05/2024      Follow up in about 3 months (around 2/5/2025), or if symptoms worsen or fail to improve.        11/5/2024 Jeanie Orozco NP    This note was generated with the assistance of ambient listening technology. Verbal consent was obtained by the patient and accompanying visitor(s) for the recording of patient appointment to facilitate this note. I attest to having reviewed and edited the generated note for accuracy, though some syntax or spelling errors may persist. Please contact the author of this note for any clarification.

## 2024-11-06 ENCOUNTER — HOSPITAL ENCOUNTER (OUTPATIENT)
Dept: RADIOLOGY | Facility: HOSPITAL | Age: 54
Discharge: HOME | End: 2024-11-06
Attending: NURSE PRACTITIONER
Payer: COMMERCIAL

## 2024-11-06 ENCOUNTER — PATIENT MESSAGE (OUTPATIENT)
Dept: FAMILY MEDICINE | Facility: CLINIC | Age: 54
End: 2024-11-06
Payer: COMMERCIAL

## 2024-11-06 VITALS — WEIGHT: 225 LBS | HEIGHT: 67 IN | BODY MASS INDEX: 35.31 KG/M2

## 2024-11-06 DIAGNOSIS — Z12.31 ENCOUNTER FOR SCREENING MAMMOGRAM FOR BREAST CANCER: ICD-10-CM

## 2024-11-06 PROCEDURE — 77063 BREAST TOMOSYNTHESIS BI: CPT | Mod: 26,,, | Performed by: RADIOLOGY

## 2024-11-06 PROCEDURE — 77067 SCR MAMMO BI INCL CAD: CPT | Mod: 26,,, | Performed by: RADIOLOGY

## 2024-11-06 PROCEDURE — 77067 SCR MAMMO BI INCL CAD: CPT | Mod: TC,PO

## 2024-11-07 DIAGNOSIS — G25.81 RLS (RESTLESS LEGS SYNDROME): Primary | ICD-10-CM

## 2024-11-07 RX ORDER — ROPINIROLE 0.25 MG/1
0.25 TABLET, FILM COATED ORAL NIGHTLY
Qty: 30 TABLET | Refills: 11 | Status: SHIPPED | OUTPATIENT
Start: 2024-11-07 | End: 2025-11-07

## 2024-12-09 NOTE — PLAN OF CARE
09/06/18 0817   Final Note   Assessment Type Final Discharge Note   Discharge Disposition Home      The patient's goals for the shift include      The clinical goals for the shift include maintain safety

## 2025-02-12 ENCOUNTER — OFFICE VISIT (OUTPATIENT)
Dept: FAMILY MEDICINE | Facility: CLINIC | Age: 55
End: 2025-02-12
Payer: COMMERCIAL

## 2025-02-12 VITALS
SYSTOLIC BLOOD PRESSURE: 104 MMHG | BODY MASS INDEX: 30.71 KG/M2 | HEIGHT: 67 IN | WEIGHT: 195.63 LBS | HEART RATE: 79 BPM | OXYGEN SATURATION: 97 % | DIASTOLIC BLOOD PRESSURE: 71 MMHG | TEMPERATURE: 98 F | RESPIRATION RATE: 17 BRPM

## 2025-02-12 DIAGNOSIS — M25.561 ARTHRALGIA OF BOTH LOWER LEGS: Primary | ICD-10-CM

## 2025-02-12 DIAGNOSIS — M25.562 ARTHRALGIA OF BOTH LOWER LEGS: Primary | ICD-10-CM

## 2025-02-12 DIAGNOSIS — F41.9 ANXIETY: ICD-10-CM

## 2025-02-12 PROCEDURE — 1159F MED LIST DOCD IN RCRD: CPT | Mod: CPTII,S$GLB,, | Performed by: NURSE PRACTITIONER

## 2025-02-12 PROCEDURE — 99213 OFFICE O/P EST LOW 20 MIN: CPT | Mod: S$GLB,,, | Performed by: NURSE PRACTITIONER

## 2025-02-12 PROCEDURE — 3078F DIAST BP <80 MM HG: CPT | Mod: CPTII,S$GLB,, | Performed by: NURSE PRACTITIONER

## 2025-02-12 PROCEDURE — 3074F SYST BP LT 130 MM HG: CPT | Mod: CPTII,S$GLB,, | Performed by: NURSE PRACTITIONER

## 2025-02-12 PROCEDURE — 99999 PR PBB SHADOW E&M-EST. PATIENT-LVL IV: CPT | Mod: PBBFAC,,, | Performed by: NURSE PRACTITIONER

## 2025-02-12 PROCEDURE — 3008F BODY MASS INDEX DOCD: CPT | Mod: CPTII,S$GLB,, | Performed by: NURSE PRACTITIONER

## 2025-02-12 RX ORDER — ALPRAZOLAM 0.5 MG/1
0.5 TABLET ORAL 2 TIMES DAILY
Qty: 60 TABLET | Refills: 2 | Status: SHIPPED | OUTPATIENT
Start: 2025-02-12 | End: 2025-05-13

## 2025-02-12 RX ORDER — DICLOFENAC SODIUM 10 MG/G
2 GEL TOPICAL 2 TIMES DAILY
Qty: 200 G | Refills: 1 | Status: SHIPPED | OUTPATIENT
Start: 2025-02-12

## 2025-02-13 NOTE — PROGRESS NOTES
Patient ID: Gi Abdullahi is a 54 y.o. female.    Chief Complaint: Follow-up (3 mo fu patient would like to speak with you regarding her bilateral leg pain at night)    Ms. Abdullahi presents today for follow up. She is doing well but overwhelmed due to her mom's cancer diagnosis. We discussed stopping xanax but she states it is very difficult at this time. We will discuss at next visit. She denies any other issues or complaints.     Follow-up  Associated symptoms include fatigue. Pertinent negatives include no abdominal pain, arthralgias, chest pain, congestion, fever, headaches, myalgias, nausea, rash, sore throat or vomiting.       Past Medical History:   Diagnosis Date    Arthritis     Cervical cancer 1995    Depression     Insomnia     Tinnitus 2020     Past Surgical History:   Procedure Laterality Date    BREAST BIOPSY Right 11/8/2019    Procedure: BIOPSY, BREAST;  Surgeon: Roni Arias MD;  Location: [facility] OR;  Service: General;  Laterality: Right;    CERVIX REMOVAL      CYST REMOVAL Right 12/2/2020    Procedure: EXCISION, CYST;  Surgeon: Adis Mccann MD;  Location: [facility] OR;  Service: Orthopedics;  Laterality: Right;    HEMORRHOID SURGERY  2018    Dr Quintero    HYSTERECTOMY  1995    LAPAROSCOPIC CHOLECYSTECTOMY N/A 9/3/2018    Procedure: CHOLECYSTECTOMY, LAPAROSCOPIC;  Surgeon: Wilmer Degroot MD;  Location: [facility] OR;  Service: General;  Laterality: N/A;    TENOTOMY, ELBOW, WITH DEBRIDEMENT Left 2/8/2024    Procedure: Left medial epicondyle debridement;  Surgeon: Robert Galdamez MD;  Location: [facility] OR;  Service: Orthopedics;  Laterality: Left;  Need FA per physician         Tobacco History:  reports that she has been smoking cigarettes. She started smoking about 38 years ago. She has a 57.3 pack-year smoking history. She has been exposed to tobacco smoke. She has never used smokeless tobacco.      Review of patient's allergies indicates:  No Known Allergies    Current Outpatient Medications:      FLUoxetine 20 MG capsule, TAKE ONE CAPSULE BY MOUTH TWICE DAILY, Disp: 60 capsule, Rfl: 2    rOPINIRole (REQUIP) 0.25 MG tablet, Take 1 tablet (0.25 mg total) by mouth every evening., Disp: 30 tablet, Rfl: 11    ALPRAZolam (XANAX) 0.5 MG tablet, Take 1 tablet (0.5 mg total) by mouth 2 (two) times daily., Disp: 60 tablet, Rfl: 2    diclofenac sodium (VOLTAREN) 1 % Gel, Apply 2 g topically 2 (two) times daily., Disp: 200 g, Rfl: 1    Review of Systems   Constitutional:  Positive for activity change and fatigue. Negative for appetite change, fever and unexpected weight change.   HENT:  Negative for congestion, mouth sores, nosebleeds, postnasal drip, rhinorrhea, sinus pressure, sinus pain, sneezing, sore throat and trouble swallowing.    Eyes:  Negative for pain, redness and itching.   Respiratory:  Negative for chest tightness and shortness of breath.    Cardiovascular:  Negative for chest pain and palpitations.   Gastrointestinal:  Negative for abdominal pain, blood in stool, constipation, diarrhea, nausea and vomiting.   Endocrine: Negative for cold intolerance, heat intolerance, polydipsia, polyphagia and polyuria.   Genitourinary:  Negative for difficulty urinating, dysuria, flank pain, frequency, genital sores, menstrual problem, urgency, vaginal bleeding and vaginal discharge.   Musculoskeletal:  Negative for arthralgias and myalgias.   Skin:  Negative for rash and wound.   Allergic/Immunologic: Negative for environmental allergies and food allergies.   Neurological:  Negative for dizziness, light-headedness and headaches.   Hematological:  Negative for adenopathy. Does not bruise/bleed easily.   Psychiatric/Behavioral:  Negative for agitation, confusion, hallucinations, self-injury and sleep disturbance. The patient is nervous/anxious.           Objective:      Vitals:    02/12/25 1429   BP: 104/71   Pulse: 79   Resp: 17   Temp: 98.4 °F (36.9 °C)   TempSrc: Oral   SpO2: 97%   Weight: 88.7 kg (195 lb 9.6 oz)  "  Height: 5' 7" (1.702 m)     Physical Exam  Constitutional:       Appearance: Normal appearance. She is obese.   Cardiovascular:      Rate and Rhythm: Normal rate and regular rhythm.      Pulses: Normal pulses.      Heart sounds: Normal heart sounds.   Pulmonary:      Effort: Pulmonary effort is normal.      Breath sounds: Normal breath sounds.   Musculoskeletal:      Cervical back: Normal range of motion.   Skin:     General: Skin is warm.   Neurological:      Mental Status: She is alert and oriented to person, place, and time. Mental status is at baseline.   Psychiatric:         Mood and Affect: Mood normal.         Behavior: Behavior normal.           Assessment:       1. Arthralgia of both lower legs    2. Anxiety           Plan:       Arthralgia of both lower legs  -     diclofenac sodium (VOLTAREN) 1 % Gel; Apply 2 g topically 2 (two) times daily.  Dispense: 200 g; Refill: 1    Anxiety  -     ALPRAZolam (XANAX) 0.5 MG tablet; Take 1 tablet (0.5 mg total) by mouth 2 (two) times daily.  Dispense: 60 tablet; Refill: 2      Follow up in about 3 months (around 5/12/2025), or if symptoms worsen or fail to improve.        2/12/2025 Jeanie Orozco NP      "

## 2025-05-06 DIAGNOSIS — F33.0 MILD EPISODE OF RECURRENT MAJOR DEPRESSIVE DISORDER: ICD-10-CM

## 2025-05-06 RX ORDER — FLUOXETINE HYDROCHLORIDE 20 MG/1
20 CAPSULE ORAL 2 TIMES DAILY
Qty: 60 CAPSULE | Refills: 2 | Status: SHIPPED | OUTPATIENT
Start: 2025-05-06

## 2025-05-12 ENCOUNTER — OFFICE VISIT (OUTPATIENT)
Dept: FAMILY MEDICINE | Facility: CLINIC | Age: 55
End: 2025-05-12
Payer: COMMERCIAL

## 2025-05-12 VITALS
SYSTOLIC BLOOD PRESSURE: 110 MMHG | HEART RATE: 68 BPM | DIASTOLIC BLOOD PRESSURE: 60 MMHG | RESPIRATION RATE: 20 BRPM | BODY MASS INDEX: 30.45 KG/M2 | HEIGHT: 67 IN | WEIGHT: 194 LBS | TEMPERATURE: 98 F

## 2025-05-12 DIAGNOSIS — E78.2 MIXED HYPERLIPIDEMIA: ICD-10-CM

## 2025-05-12 DIAGNOSIS — Z23 NEED FOR SHINGLES VACCINE: ICD-10-CM

## 2025-05-12 DIAGNOSIS — Z13.1 DIABETES MELLITUS SCREENING: ICD-10-CM

## 2025-05-12 DIAGNOSIS — F41.9 ANXIETY: ICD-10-CM

## 2025-05-12 DIAGNOSIS — G25.81 RLS (RESTLESS LEGS SYNDROME): Primary | ICD-10-CM

## 2025-05-12 PROCEDURE — 1159F MED LIST DOCD IN RCRD: CPT | Mod: CPTII,S$GLB,, | Performed by: NURSE PRACTITIONER

## 2025-05-12 PROCEDURE — 3008F BODY MASS INDEX DOCD: CPT | Mod: CPTII,S$GLB,, | Performed by: NURSE PRACTITIONER

## 2025-05-12 PROCEDURE — 3074F SYST BP LT 130 MM HG: CPT | Mod: CPTII,S$GLB,, | Performed by: NURSE PRACTITIONER

## 2025-05-12 PROCEDURE — 3078F DIAST BP <80 MM HG: CPT | Mod: CPTII,S$GLB,, | Performed by: NURSE PRACTITIONER

## 2025-05-12 PROCEDURE — 99999 PR PBB SHADOW E&M-EST. PATIENT-LVL III: CPT | Mod: PBBFAC,,, | Performed by: NURSE PRACTITIONER

## 2025-05-12 PROCEDURE — 99213 OFFICE O/P EST LOW 20 MIN: CPT | Mod: S$GLB,,, | Performed by: NURSE PRACTITIONER

## 2025-05-12 RX ORDER — ZOSTER VACCINE RECOMBINANT, ADJUVANTED 50 MCG/0.5
0.5 KIT INTRAMUSCULAR ONCE
Qty: 1 EACH | Refills: 0 | Status: SHIPPED | OUTPATIENT
Start: 2025-05-12 | End: 2025-05-12

## 2025-05-12 RX ORDER — ROPINIROLE 0.5 MG/1
0.5 TABLET, FILM COATED ORAL NIGHTLY
Qty: 30 TABLET | Refills: 11 | Status: SHIPPED | OUTPATIENT
Start: 2025-05-12 | End: 2026-05-12

## 2025-05-12 RX ORDER — ALPRAZOLAM 0.5 MG/1
0.5 TABLET ORAL 2 TIMES DAILY
Qty: 60 TABLET | Refills: 2 | Status: SHIPPED | OUTPATIENT
Start: 2025-05-12 | End: 2025-08-10

## 2025-05-12 NOTE — PROGRESS NOTES
Patient ID: Gi Abdullahi is a 55 y.o. female.    Chief Complaint: Follow-up (54 yo female here for 3 month recheck. Pt states she still experiencing restless leg flare at night. KM)    History of Present Illness    CHIEF COMPLAINT:  Ms. Abdullahi presents with complaints of restless leg syndrome and hip pain affecting sleep and daily activities.    HPI:  Ms. Abdullahi reports ongoing issues with restless leg syndrome, describing it as severely painful and disruptive to sleep. Both legs are affected, with the right leg being more severe than the left. The pain is localized from the knee down to the foot, primarily occurring at night and not during waking hours.    She mentions hip pain, particularly on one side, which interferes with her ability to sleep comfortably and makes lying on her side difficult. She attributes some of her discomfort to her weight and has been trying to lose weight gradually.    Ms. Abdullahi is frequently on her feet at work, walking extensively. She has been engaging in evening walks with her  as part of her weight loss efforts. After a busy day of activity, it takes her about 1.5 hours to fall asleep due to her legs and body still feeling active.    She is currently taking Requip (ropinirole) at the lowest possible dose (0.25 mg) for her restless leg syndrome. The medication significantly reduces the severity of symptoms but has not completely resolved them.    She denies experiencing numbness and tingling in her legs.    MEDICATIONS:  Ms. Abdullahi is on Requip (Ropinirole) 0.25 mg for restless leg syndrome. She is also taking Fluoxetine and Xanax (Alprazolam) 0.5 mg.    MEDICAL HISTORY:  Ms. Abdullahi has a history of restless leg syndrome.    SURGICAL HISTORY:  She has undergone hip surgery.    SOCIAL HISTORY:  Occupation: Employed    Marital status:       ROS:  General: -fever, -chills, -fatigue, -weight gain, -weight loss  Eyes: -vision changes, -redness, -discharge  ENT: -ear pain,  -nasal congestion, -sore throat  Cardiovascular: -chest pain, -palpitations, -lower extremity edema  Respiratory: -cough, -shortness of breath  Gastrointestinal: -abdominal pain, -nausea, -vomiting, -diarrhea, -constipation, -blood in stool  Genitourinary: -dysuria, -hematuria, -frequency  Musculoskeletal: -joint pain, -muscle pain, +limb pain, +pain with movement  Skin: -rash, -lesion  Neurological: -headache, -dizziness, -numbness, -tingling, +restless legs, +difficulty falling asleep, +sleep disturbances  Psychiatric: -anxiety, -depression, +sleep difficulty, +racing thoughts             Past Medical History:   Diagnosis Date    Arthritis     Cervical cancer 1995    Depression     Insomnia     Tinnitus 2020     Past Surgical History:   Procedure Laterality Date    BREAST BIOPSY Right 11/8/2019    Procedure: BIOPSY, BREAST;  Surgeon: Roni Arias MD;  Location: [facility] OR;  Service: General;  Laterality: Right;    CERVIX REMOVAL      CYST REMOVAL Right 12/2/2020    Procedure: EXCISION, CYST;  Surgeon: Adis Mccann MD;  Location: [facility] OR;  Service: Orthopedics;  Laterality: Right;    HEMORRHOID SURGERY  2018    Dr Quintero    HYSTERECTOMY  1995    LAPAROSCOPIC CHOLECYSTECTOMY N/A 9/3/2018    Procedure: CHOLECYSTECTOMY, LAPAROSCOPIC;  Surgeon: Wilmer Degroot MD;  Location: [facility] OR;  Service: General;  Laterality: N/A;    TENOTOMY, ELBOW, WITH DEBRIDEMENT Left 2/8/2024    Procedure: Left medial epicondyle debridement;  Surgeon: Robert Galdamez MD;  Location: [facility] OR;  Service: Orthopedics;  Laterality: Left;  Need FA per physician         Tobacco History:  reports that she has been smoking cigarettes. She started smoking about 38 years ago. She has a 57.6 pack-year smoking history. She has been exposed to tobacco smoke. She has never used smokeless tobacco.      Review of patient's allergies indicates:  No Known Allergies  Current Medications[1]           Objective:      Vitals:    05/12/25 1547   BP:  "110/60   Pulse: 68   Resp: 20   Temp: 98.2 °F (36.8 °C)   TempSrc: Oral   Weight: 88 kg (194 lb 0.1 oz)   Height: 5' 7" (1.702 m)     Physical Exam  Constitutional:       Appearance: Normal appearance. She is obese.   Cardiovascular:      Rate and Rhythm: Normal rate and regular rhythm.      Pulses: Normal pulses.      Heart sounds: Normal heart sounds.   Pulmonary:      Effort: Pulmonary effort is normal.      Breath sounds: Normal breath sounds.   Musculoskeletal:      Cervical back: Normal range of motion.   Skin:     General: Skin is warm.   Neurological:      Mental Status: She is alert and oriented to person, place, and time. Mental status is at baseline.   Psychiatric:         Mood and Affect: Mood normal.         Behavior: Behavior normal.           Assessment:       1. RLS (restless legs syndrome)    2. Anxiety    3. Diabetes mellitus screening    4. Mixed hyperlipidemia    5. Need for shingles vaccine           Plan:       Assessment & Plan    G25.81 Restless legs syndrome  M25.552 Pain in left hip  F32.A Depression, unspecified  F41.9 Anxiety disorder, unspecified    IMPRESSION:  - Assessed restless leg syndrome (RLS) symptoms, noting current treatment with Requip at lowest dose provides some relief. Explained variability in RLS treatment response among patients.    RESTLESS LEGS SYNDROME:  - Ms. Abdullahi reports very painful restless legs that wake her at night, affecting both legs with one worse than the other.  - Evaluated that Requip at lowest dose takes the edge off symptoms.  - Increased Requip from 0.25 mg to 0.5 mg daily.  - Discussed importance of gradual medication titration.  - Instructed to take 0.5 mg daily for a few weeks, then add 0.25 mg to reach 0.75 mg daily if needed.    HIP PAIN:  - Ms. Abdullahi reports hip pain, especially when lying on her side, which affects sleep.  - Will explore additional treatment options.    DEPRESSION:  - Evaluated that Fluoxetine is still working effectively.  - " Continued at current dose.    ANXIETY DISORDER:  - Prescribed refill of Xanax 0.5 mg and continued at current dose.    PREVENTIVE CARE:  - Provided information on shingles vaccination, including the two-dose regimen and its importance in prevention.  - Ordered shingles vaccine (2-dose series).  - Ordered labs: CBC, CMP, lipid panel, Hemoglobin A1C, and thyroid function tests.    FOLLOW-UP:  - Schedule appointment for shingles vaccine at [facility].  - Complete lab work at [facility] ([facility]) on Saturday morning.         RLS (restless legs syndrome)  -     rOPINIRole (REQUIP) 0.5 MG tablet; Take 1 tablet (0.5 mg total) by mouth every evening.  Dispense: 30 tablet; Refill: 11    Anxiety  -     ALPRAZolam (XANAX) 0.5 MG tablet; Take 1 tablet (0.5 mg total) by mouth 2 (two) times daily.  Dispense: 60 tablet; Refill: 2  -     Comprehensive Metabolic Panel; Future; Expected date: 05/12/2025  -     CBC Auto Differential; Future; Expected date: 05/12/2025  -     TSH; Future; Expected date: 05/12/2025    Diabetes mellitus screening  -     HEMOGLOBIN A1C; Future; Expected date: 05/12/2025    Mixed hyperlipidemia  -     LIPID PANEL; Future; Expected date: 05/12/2025    Need for shingles vaccine  -     varicella-zoster [id number], PF, (SHINGRIX, PF,) 50 mcg/0.5 mL injection; Inject 0.5 mLs into the muscle once. for 1 dose  Dispense: 1 each; Refill: 0      Follow up in about 3 months (around 8/12/2025).        5/12/2025 Jeanie Orozco NP    This note was generated with the assistance of ambient listening technology. Verbal consent was obtained by the patient and accompanying visitor(s) for the recording of patient appointment to facilitate this note. I attest to having reviewed and edited the generated note for accuracy, though some syntax or spelling errors may persist. Please contact the author of this note for any clarification.             [1]   Current Outpatient Medications:     diclofenac sodium  (VOLTAREN) 1 % Gel, Apply 2 g topically 2 (two) times daily., Disp: 200 g, Rfl: 1    FLUoxetine 20 MG capsule, Take 1 capsule (20 mg total) by mouth 2 (two) times daily., Disp: 60 capsule, Rfl: 2    ALPRAZolam (XANAX) 0.5 MG tablet, Take 1 tablet (0.5 mg total) by mouth 2 (two) times daily., Disp: 60 tablet, Rfl: 2    rOPINIRole (REQUIP) 0.5 MG tablet, Take 1 tablet (0.5 mg total) by mouth every evening., Disp: 30 tablet, Rfl: 11    varicella-zoster [id number], PF, (SHINGRIX, PF,) 50 mcg/0.5 mL injection, Inject 0.5 mLs into the muscle once. for 1 dose, Disp: 1 each, Rfl: 0

## 2025-05-24 ENCOUNTER — RESULTS FOLLOW-UP (OUTPATIENT)
Dept: FAMILY MEDICINE | Facility: CLINIC | Age: 55
End: 2025-05-24

## 2025-07-30 DIAGNOSIS — F33.0 MILD EPISODE OF RECURRENT MAJOR DEPRESSIVE DISORDER: ICD-10-CM

## 2025-07-30 RX ORDER — FLUOXETINE 20 MG/1
20 CAPSULE ORAL 2 TIMES DAILY
Qty: 60 CAPSULE | Refills: 2 | Status: SHIPPED | OUTPATIENT
Start: 2025-07-30

## 2025-08-03 DIAGNOSIS — F41.9 ANXIETY: ICD-10-CM

## 2025-08-04 RX ORDER — ALPRAZOLAM 0.5 MG/1
0.5 TABLET ORAL 2 TIMES DAILY
Qty: 60 TABLET | Refills: 2 | Status: SHIPPED | OUTPATIENT
Start: 2025-08-04

## 2025-08-12 ENCOUNTER — OFFICE VISIT (OUTPATIENT)
Dept: FAMILY MEDICINE | Facility: CLINIC | Age: 55
End: 2025-08-12
Payer: COMMERCIAL

## 2025-08-12 VITALS
BODY MASS INDEX: 30.86 KG/M2 | TEMPERATURE: 98 F | RESPIRATION RATE: 20 BRPM | HEIGHT: 67 IN | DIASTOLIC BLOOD PRESSURE: 70 MMHG | HEART RATE: 64 BPM | WEIGHT: 196.63 LBS | SYSTOLIC BLOOD PRESSURE: 128 MMHG

## 2025-08-12 DIAGNOSIS — E78.2 MIXED HYPERLIPIDEMIA: Primary | ICD-10-CM

## 2025-08-12 DIAGNOSIS — R41.840 ATTENTION DEFICIT: ICD-10-CM

## 2025-08-12 DIAGNOSIS — N95.1 VASOMOTOR SYMPTOMS DUE TO MENOPAUSE: ICD-10-CM

## 2025-08-12 PROCEDURE — 3044F HG A1C LEVEL LT 7.0%: CPT | Mod: CPTII,S$GLB,, | Performed by: NURSE PRACTITIONER

## 2025-08-12 PROCEDURE — 1159F MED LIST DOCD IN RCRD: CPT | Mod: CPTII,S$GLB,, | Performed by: NURSE PRACTITIONER

## 2025-08-12 PROCEDURE — 99999 PR PBB SHADOW E&M-EST. PATIENT-LVL IV: CPT | Mod: PBBFAC,,, | Performed by: NURSE PRACTITIONER

## 2025-08-12 PROCEDURE — 3078F DIAST BP <80 MM HG: CPT | Mod: CPTII,S$GLB,, | Performed by: NURSE PRACTITIONER

## 2025-08-12 PROCEDURE — 3074F SYST BP LT 130 MM HG: CPT | Mod: CPTII,S$GLB,, | Performed by: NURSE PRACTITIONER

## 2025-08-12 PROCEDURE — 99213 OFFICE O/P EST LOW 20 MIN: CPT | Mod: S$GLB,,, | Performed by: NURSE PRACTITIONER

## 2025-08-12 PROCEDURE — 3008F BODY MASS INDEX DOCD: CPT | Mod: CPTII,S$GLB,, | Performed by: NURSE PRACTITIONER

## 2025-08-12 RX ORDER — FEZOLINETANT 45 MG/1
45 TABLET, FILM COATED ORAL DAILY
Qty: 30 TABLET | Refills: 3 | Status: SHIPPED | OUTPATIENT
Start: 2025-08-12

## 2025-08-12 RX ORDER — ROSUVASTATIN CALCIUM 5 MG/1
5 TABLET, COATED ORAL DAILY
Qty: 90 TABLET | Refills: 3 | Status: SHIPPED | OUTPATIENT
Start: 2025-08-12 | End: 2026-08-12

## 2025-08-12 RX ORDER — ATOMOXETINE 40 MG/1
40 CAPSULE ORAL DAILY
Qty: 30 CAPSULE | Refills: 0 | Status: SHIPPED | OUTPATIENT
Start: 2025-08-12 | End: 2025-09-11

## (undated) DEVICE — KIT ANTIFOG

## (undated) DEVICE — KIT SAHARA DRAPE DRAW/LIFT

## (undated) DEVICE — SPONGE LAP 18X18 PREWASHED

## (undated) DEVICE — GLOVE PROTEXIS PI SYN SURG 7

## (undated) DEVICE — SPONGE PEANUT 3/8 7103

## (undated) DEVICE — DRESSING XEROFORM FOIL PK 1X8

## (undated) DEVICE — STERISTRIP 1/2 R1547

## (undated) DEVICE — SUTURE VICRYL 3-0 SH 27 VCP416H

## (undated) DEVICE — TUBING PNEUMO

## (undated) DEVICE — SOLUTION IRRI NS BOTTLE 1000ML R5200-01

## (undated) DEVICE — APPLIER CLIP EPIX UNIV 5X34

## (undated) DEVICE — PACK CUSTOM ENDO CHOLO SLI

## (undated) DEVICE — UNDERGLOVE BIOGEL PI MICRO BLUE SZ 7

## (undated) DEVICE — HANDLE SURG LIGHT NONRIGID

## (undated) DEVICE — PADDING CAST 3 STERILE 30-320

## (undated) DEVICE — SUT 0 VICRYL / CT-1

## (undated) DEVICE — SYR 10CC LUER LOCK

## (undated) DEVICE — DRESSING LEUKOPLAST FLEX 1X3IN

## (undated) DEVICE — PAD BOVIE ADULT

## (undated) DEVICE — BANDAGE MATRIX HK LOOP 4IN 5YD

## (undated) DEVICE — Device

## (undated) DEVICE — BANDAGE ESMARK LATEX FREE 4INX

## (undated) DEVICE — SEE L#120831

## (undated) DEVICE — TUBING SUC UNIV W/CONN 12FT

## (undated) DEVICE — DRAPE CLEAR SMALL 1000

## (undated) DEVICE — SOL WATER STRL IRR 1000ML

## (undated) DEVICE — CORD BIPOLAR 12 FOOT

## (undated) DEVICE — SLEEVE SCD EXPRESS CALF MEDIUM

## (undated) DEVICE — MASTISOL WITH SPRAY PUMP SURGICAL ADHESIVE 1/2 OZ

## (undated) DEVICE — GLOVE BIOGEL PI GOLD SZ  8.5

## (undated) DEVICE — STRAP OR TABLE 5IN X 72IN

## (undated) DEVICE — BANDAGE ESMARK 4X9 55514

## (undated) DEVICE — SUTURE MONOCRYL 4-0 27 SH MCP415H

## (undated) DEVICE — APPLICATOR CHLORAPREP ORN 26ML

## (undated) DEVICE — TRAY UPPER EXTREMITY

## (undated) DEVICE — TRAY GENERAL SURGERY

## (undated) DEVICE — BAG TISS RETRV MONARCH 10MM

## (undated) DEVICE — TIP BOVIE ENT 2.75      E1455

## (undated) DEVICE — ELECTRODE REM PLYHSV RETURN 9

## (undated) DEVICE — PENCIL ROCKER SWITCH 10FT CORD

## (undated) DEVICE — SEE MEDLINE ITEM 157117

## (undated) DEVICE — SOL IRR NACL .9% 3000ML

## (undated) DEVICE — IRRIGATOR SUCTION W/TIP

## (undated) DEVICE — GAUZE SPONGE 4X4 12PLY

## (undated) DEVICE — BANDAGE ACE 2 STERILE VELCRO REB3112

## (undated) DEVICE — COVER MAYO STAND 89601

## (undated) DEVICE — DISSECTOR CURVED 5DCD

## (undated) DEVICE — TROCAR ENDOPATH XCEL 5X100MM

## (undated) DEVICE — SUTURE MONOCRYL 3-0 27 PS-1 MCP936H

## (undated) DEVICE — TROCAR KII BLLN 12MM 10CM

## (undated) DEVICE — UNDERGLOVE BIOGEL PI MICRO BLUE SZ 6.5

## (undated) DEVICE — CLOSURE SKIN STERI STRIP 1/2X4

## (undated) DEVICE — SUT 4-0 ETHILON 18 PS-2

## (undated) DEVICE — SUT ETHILON 3-0 PS2 18 BLK

## (undated) DEVICE — GLOVE PROTEXIS LTX MICRO  7.5

## (undated) DEVICE — DRAPE STERI-DRAPE 1000 17X11IN

## (undated) DEVICE — GLOVE PROTEXIS LTX MICRO 8

## (undated) DEVICE — GLOVE BIOGEL PI ULTRA TOUCH G GRAY SZ 7

## (undated) DEVICE — SUTURE ETHILON 4-0 PS-2 18 1667H

## (undated) DEVICE — NDL SAFETY 21G X 1 1/2 ECLPSE

## (undated) DEVICE — DRAPE THREE-QTR REINF 53X77IN

## (undated) DEVICE — DRAPE HAND STERILE

## (undated) DEVICE — SUT ETHILON 4-0 PS2 18 BLK

## (undated) DEVICE — TOWEL OR BLUE      MDT2168284

## (undated) DEVICE — YANKAUER OPEN TIP W/O VENT

## (undated) DEVICE — LINER SUCTION 3000CC

## (undated) DEVICE — UNDERGLOVE BIOGEL MICRO BLUE SZ 8.5

## (undated) DEVICE — SCISSOR 5MMX35CM DIRECT DRIVE

## (undated) DEVICE — SUT MONOCRYL 4-0 PS-2

## (undated) DEVICE — SUT 0 VICRYL / UR6 (J603)

## (undated) DEVICE — DRAPE LAPAROTOMY TRANSVERSE 89281

## (undated) DEVICE — NDL HYPO 27G X 1 1/2

## (undated) DEVICE — NEEDLE SAFETY ECLIPSE 25G 1-1/2IN 305767

## (undated) DEVICE — SUCTION FRAZIER 10FR  0033100

## (undated) DEVICE — TOURNIQUET SB QC DP 18X4IN

## (undated) DEVICE — GOWN SMARTGOWN LVL4 X-LONG XL

## (undated) DEVICE — BOWL STERILE LARGE 32OZ

## (undated) DEVICE — PADDING CAST 4IN SPECIALIST

## (undated) DEVICE — SPONGE GAUZE 10S 4X4  442214

## (undated) DEVICE — SUT 3-0 VICRYL / SH (J416)

## (undated) DEVICE — FORCEP STRAIGHT DISP

## (undated) DEVICE — ALCOHOL 70% ISOP RUBBING 4OZ

## (undated) DEVICE — SUTURE MONOCRYL 4-0 PS-2 27 MCP426H

## (undated) DEVICE — SEE MEDLINE ITEM 152622